# Patient Record
Sex: FEMALE | Race: WHITE | Employment: OTHER | URBAN - METROPOLITAN AREA
[De-identification: names, ages, dates, MRNs, and addresses within clinical notes are randomized per-mention and may not be internally consistent; named-entity substitution may affect disease eponyms.]

---

## 2017-01-10 ENCOUNTER — OFFICE VISIT (OUTPATIENT)
Dept: FAMILY MEDICINE CLINIC | Age: 82
End: 2017-01-10

## 2017-01-10 VITALS
WEIGHT: 181.25 LBS | DIASTOLIC BLOOD PRESSURE: 76 MMHG | SYSTOLIC BLOOD PRESSURE: 164 MMHG | HEART RATE: 60 BPM | BODY MASS INDEX: 25.95 KG/M2 | RESPIRATION RATE: 16 BRPM | HEIGHT: 70 IN

## 2017-01-10 DIAGNOSIS — D69.6 THROMBOCYTOPENIA, UNSPECIFIED (HCC): ICD-10-CM

## 2017-01-10 DIAGNOSIS — I10 ESSENTIAL HYPERTENSION: Primary | ICD-10-CM

## 2017-01-10 PROCEDURE — 99213 OFFICE O/P EST LOW 20 MIN: CPT | Performed by: FAMILY MEDICINE

## 2017-01-10 ASSESSMENT — ENCOUNTER SYMPTOMS
SINUS PRESSURE: 0
CONSTIPATION: 0
SHORTNESS OF BREATH: 0

## 2017-06-08 ENCOUNTER — OFFICE VISIT (OUTPATIENT)
Dept: FAMILY MEDICINE CLINIC | Age: 82
End: 2017-06-08

## 2017-06-08 VITALS
HEIGHT: 70 IN | BODY MASS INDEX: 25.52 KG/M2 | RESPIRATION RATE: 16 BRPM | WEIGHT: 178.25 LBS | HEART RATE: 68 BPM | DIASTOLIC BLOOD PRESSURE: 62 MMHG | SYSTOLIC BLOOD PRESSURE: 112 MMHG

## 2017-06-08 DIAGNOSIS — I79.8 OTHER DISORDERS OF ARTERIES, ARTERIOLES AND CAPILLARIES IN DISEASES CLASSIFIED ELSEWHERE (HCC): ICD-10-CM

## 2017-06-08 DIAGNOSIS — I77.9 BILATERAL CAROTID ARTERY DISEASE (HCC): ICD-10-CM

## 2017-06-08 DIAGNOSIS — I10 ESSENTIAL HYPERTENSION: ICD-10-CM

## 2017-06-08 DIAGNOSIS — Z23 NEED FOR PNEUMOCOCCAL VACCINATION: ICD-10-CM

## 2017-06-08 DIAGNOSIS — H61.23 BILATERAL IMPACTED CERUMEN: Primary | ICD-10-CM

## 2017-06-08 PROCEDURE — 99214 OFFICE O/P EST MOD 30 MIN: CPT | Performed by: FAMILY MEDICINE

## 2017-06-08 PROCEDURE — 69210 REMOVE IMPACTED EAR WAX UNI: CPT | Performed by: FAMILY MEDICINE

## 2017-06-08 RX ORDER — LOSARTAN POTASSIUM AND HYDROCHLOROTHIAZIDE 12.5; 1 MG/1; MG/1
1 TABLET ORAL DAILY
Qty: 90 TABLET | Refills: 3 | Status: SHIPPED | OUTPATIENT
Start: 2017-06-08 | End: 2017-12-11 | Stop reason: SDUPTHER

## 2017-06-08 RX ORDER — BUMETANIDE 1 MG/1
1 TABLET ORAL DAILY
Qty: 90 TABLET | Refills: 3 | Status: SHIPPED | OUTPATIENT
Start: 2017-06-08 | End: 2017-12-11 | Stop reason: SDUPTHER

## 2017-06-08 ASSESSMENT — ENCOUNTER SYMPTOMS
SHORTNESS OF BREATH: 0
SINUS PRESSURE: 0
CONSTIPATION: 0

## 2017-06-10 LAB
CHOLESTEROL, TOTAL: NORMAL MG/DL
CHOLESTEROL/HDL RATIO: NORMAL
HDLC SERPL-MCNC: NORMAL MG/DL (ref 35–70)
LDL CHOLESTEROL CALCULATED: 127 MG/DL (ref 0–160)
TRIGL SERPL-MCNC: NORMAL MG/DL
VLDLC SERPL CALC-MCNC: NORMAL MG/DL

## 2017-06-12 DIAGNOSIS — I10 ESSENTIAL HYPERTENSION: ICD-10-CM

## 2017-06-13 ENCOUNTER — TELEPHONE (OUTPATIENT)
Dept: FAMILY MEDICINE CLINIC | Age: 82
End: 2017-06-13

## 2017-07-19 ENCOUNTER — HOSPITAL ENCOUNTER (INPATIENT)
Age: 82
LOS: 5 days | Discharge: NURSING, REHAB OR LONG-TERM CARE | DRG: 493 | End: 2017-07-24
Attending: FAMILY MEDICINE | Admitting: SURGERY
Payer: OTHER MISCELLANEOUS

## 2017-07-19 ENCOUNTER — APPOINTMENT (OUTPATIENT)
Dept: GENERAL RADIOLOGY | Age: 82
DRG: 493 | End: 2017-07-19
Payer: OTHER MISCELLANEOUS

## 2017-07-19 ENCOUNTER — APPOINTMENT (OUTPATIENT)
Dept: CT IMAGING | Age: 82
DRG: 493 | End: 2017-07-19
Payer: OTHER MISCELLANEOUS
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
## 2017-07-19 DIAGNOSIS — T07.XXXA MULTIPLE CONTUSIONS: ICD-10-CM

## 2017-07-19 DIAGNOSIS — N28.1: ICD-10-CM

## 2017-07-19 DIAGNOSIS — S32.301A: ICD-10-CM

## 2017-07-19 DIAGNOSIS — S42.201A CLOSED FRACTURE OF PROXIMAL END OF RIGHT HUMERUS, UNSPECIFIED FRACTURE MORPHOLOGY, INITIAL ENCOUNTER: ICD-10-CM

## 2017-07-19 DIAGNOSIS — S22.41XA CLOSED FRACTURE OF MULTIPLE RIBS OF RIGHT SIDE, INITIAL ENCOUNTER: Primary | ICD-10-CM

## 2017-07-19 PROBLEM — S22.43XA MULTIPLE FRACTURES OF RIBS, BILATERAL, INITIAL ENCOUNTER FOR CLOSED FRACTURE: Status: ACTIVE | Noted: 2017-07-19

## 2017-07-19 PROBLEM — N30.00 ACUTE CYSTITIS WITHOUT HEMATURIA: Status: ACTIVE | Noted: 2017-07-19

## 2017-07-19 LAB
ALBUMIN SERPL-MCNC: 3.6 G/DL (ref 3.5–5.1)
ALP BLD-CCNC: 94 U/L (ref 38–126)
ALT SERPL-CCNC: 16 U/L (ref 11–66)
AMYLASE: 34 U/L (ref 20–104)
ANION GAP SERPL CALCULATED.3IONS-SCNC: 15 MEQ/L (ref 8–16)
ANISOCYTOSIS: ABNORMAL
APTT: 28.4 SECONDS (ref 22–38)
AST SERPL-CCNC: 23 U/L (ref 5–40)
BACTERIA: ABNORMAL /HPF
BASOPHILS # BLD: 0.7 %
BASOPHILS ABSOLUTE: 0.1 THOU/MM3 (ref 0–0.1)
BILIRUB SERPL-MCNC: 0.3 MG/DL (ref 0.3–1.2)
BILIRUBIN URINE: NEGATIVE
BLOOD, URINE: ABNORMAL
BUN BLDV-MCNC: 20 MG/DL (ref 7–22)
CALCIUM SERPL-MCNC: 9.1 MG/DL (ref 8.5–10.5)
CASTS 2: ABNORMAL /LPF
CASTS UA: ABNORMAL /LPF
CHARACTER, URINE: CLEAR
CHLORIDE BLD-SCNC: 99 MEQ/L (ref 98–111)
CO2: 26 MEQ/L (ref 23–33)
COLOR: YELLOW
CREAT SERPL-MCNC: 0.7 MG/DL (ref 0.4–1.2)
CRYSTALS, UA: ABNORMAL
EOSINOPHIL # BLD: 4 %
EOSINOPHILS ABSOLUTE: 0.4 THOU/MM3 (ref 0–0.4)
EPITHELIAL CELLS, UA: ABNORMAL /HPF
GFR SERPL CREATININE-BSD FRML MDRD: 79 ML/MIN/1.73M2
GLUCOSE BLD-MCNC: 117 MG/DL (ref 70–108)
GLUCOSE URINE: NEGATIVE MG/DL
HCT VFR BLD CALC: 38.2 % (ref 37–47)
HEMOGLOBIN: 12.9 GM/DL (ref 12–16)
INR BLD: 0.93 (ref 0.85–1.13)
KETONES, URINE: ABNORMAL
LEUKOCYTE ESTERASE, URINE: ABNORMAL
LIPASE: 44.7 U/L (ref 5.6–51.3)
LYMPHOCYTES # BLD: 14 %
LYMPHOCYTES ABSOLUTE: 1.3 THOU/MM3 (ref 1–4.8)
MCH RBC QN AUTO: 30.4 PG (ref 27–31)
MCHC RBC AUTO-ENTMCNC: 33.6 GM/DL (ref 33–37)
MCV RBC AUTO: 90.4 FL (ref 81–99)
MISCELLANEOUS 2: ABNORMAL
MONOCYTES # BLD: 8.1 %
MONOCYTES ABSOLUTE: 0.8 THOU/MM3 (ref 0.4–1.3)
NITRITE, URINE: POSITIVE
NUCLEATED RED BLOOD CELLS: 0 /100 WBC
OSMOLALITY CALCULATION: 283 MOSMOL/KG (ref 275–300)
PDW BLD-RTO: 15.8 % (ref 11.5–14.5)
PH UA: 5.5
PLATELET # BLD: 153 THOU/MM3 (ref 130–400)
PMV BLD AUTO: 8.9 MCM (ref 7.4–10.4)
POTASSIUM SERPL-SCNC: 3.4 MEQ/L (ref 3.5–5.2)
PROTEIN UA: NEGATIVE
RBC # BLD: 4.23 MILL/MM3 (ref 4.2–5.4)
RBC # BLD: NORMAL 10*6/UL
RBC URINE: ABNORMAL /HPF
RENAL EPITHELIAL, UA: ABNORMAL
SEG NEUTROPHILS: 73.2 %
SEGMENTED NEUTROPHILS ABSOLUTE COUNT: 7 THOU/MM3 (ref 1.8–7.7)
SODIUM BLD-SCNC: 140 MEQ/L (ref 135–145)
SPECIFIC GRAVITY, URINE: > 1.03 (ref 1–1.03)
TOTAL PROTEIN: 6.7 G/DL (ref 6.1–8)
TROPONIN T: < 0.01 NG/ML
UROBILINOGEN, URINE: 0.2 EU/DL
WBC # BLD: 9.6 THOU/MM3 (ref 4.8–10.8)
WBC UA: ABNORMAL /HPF
YEAST: ABNORMAL

## 2017-07-19 PROCEDURE — 6820000002 HC L2 INJURY CALL ACTIVATION

## 2017-07-19 PROCEDURE — 85025 COMPLETE CBC W/AUTO DIFF WBC: CPT

## 2017-07-19 PROCEDURE — 82150 ASSAY OF AMYLASE: CPT

## 2017-07-19 PROCEDURE — 96376 TX/PRO/DX INJ SAME DRUG ADON: CPT

## 2017-07-19 PROCEDURE — 80053 COMPREHEN METABOLIC PANEL: CPT

## 2017-07-19 PROCEDURE — 36415 COLL VENOUS BLD VENIPUNCTURE: CPT

## 2017-07-19 PROCEDURE — 73552 X-RAY EXAM OF FEMUR 2/>: CPT

## 2017-07-19 PROCEDURE — L0450 TLSO FLEX TRUNK/THOR PRE OTS: HCPCS

## 2017-07-19 PROCEDURE — 73060 X-RAY EXAM OF HUMERUS: CPT

## 2017-07-19 PROCEDURE — 2580000003 HC RX 258: Performed by: FAMILY MEDICINE

## 2017-07-19 PROCEDURE — 96361 HYDRATE IV INFUSION ADD-ON: CPT

## 2017-07-19 PROCEDURE — 99223 1ST HOSP IP/OBS HIGH 75: CPT | Performed by: SURGERY

## 2017-07-19 PROCEDURE — A6445 CONFORM BAND S W <3"/YD: HCPCS

## 2017-07-19 PROCEDURE — 85730 THROMBOPLASTIN TIME PARTIAL: CPT

## 2017-07-19 PROCEDURE — 93005 ELECTROCARDIOGRAM TRACING: CPT

## 2017-07-19 PROCEDURE — 6360000004 HC RX CONTRAST MEDICATION: Performed by: FAMILY MEDICINE

## 2017-07-19 PROCEDURE — 1200000003 HC TELEMETRY R&B

## 2017-07-19 PROCEDURE — 96374 THER/PROPH/DIAG INJ IV PUSH: CPT

## 2017-07-19 PROCEDURE — 6360000002 HC RX W HCPCS: Performed by: FAMILY MEDICINE

## 2017-07-19 PROCEDURE — 83690 ASSAY OF LIPASE: CPT

## 2017-07-19 PROCEDURE — 70450 CT HEAD/BRAIN W/O DYE: CPT

## 2017-07-19 PROCEDURE — 29105 APPLICATION LONG ARM SPLINT: CPT

## 2017-07-19 PROCEDURE — 85610 PROTHROMBIN TIME: CPT

## 2017-07-19 PROCEDURE — 99285 EMERGENCY DEPT VISIT HI MDM: CPT

## 2017-07-19 PROCEDURE — 71260 CT THORAX DX C+: CPT

## 2017-07-19 PROCEDURE — 87086 URINE CULTURE/COLONY COUNT: CPT

## 2017-07-19 PROCEDURE — A6212 FOAM DRG <=16 SQ IN W/BORDER: HCPCS

## 2017-07-19 PROCEDURE — 6370000000 HC RX 637 (ALT 250 FOR IP): Performed by: SURGERY

## 2017-07-19 PROCEDURE — 72125 CT NECK SPINE W/O DYE: CPT

## 2017-07-19 PROCEDURE — 96375 TX/PRO/DX INJ NEW DRUG ADDON: CPT

## 2017-07-19 PROCEDURE — 84484 ASSAY OF TROPONIN QUANT: CPT

## 2017-07-19 PROCEDURE — 74177 CT ABD & PELVIS W/CONTRAST: CPT

## 2017-07-19 PROCEDURE — 81001 URINALYSIS AUTO W/SCOPE: CPT

## 2017-07-19 RX ORDER — BUMETANIDE 1 MG/1
1 TABLET ORAL DAILY
Status: DISCONTINUED | OUTPATIENT
Start: 2017-07-20 | End: 2017-07-24 | Stop reason: HOSPADM

## 2017-07-19 RX ORDER — LOSARTAN POTASSIUM AND HYDROCHLOROTHIAZIDE 12.5; 1 MG/1; MG/1
1 TABLET ORAL DAILY
Status: DISCONTINUED | OUTPATIENT
Start: 2017-07-19 | End: 2017-07-19 | Stop reason: CLARIF

## 2017-07-19 RX ORDER — DIPHENHYDRAMINE HCL 25 MG
25 TABLET ORAL NIGHTLY PRN
Status: ON HOLD | COMMUNITY
End: 2018-01-19

## 2017-07-19 RX ORDER — HYDROCHLOROTHIAZIDE 25 MG/1
12.5 TABLET ORAL DAILY
Status: DISCONTINUED | OUTPATIENT
Start: 2017-07-20 | End: 2017-07-24 | Stop reason: HOSPADM

## 2017-07-19 RX ORDER — ACETAMINOPHEN 325 MG/1
650 TABLET ORAL EVERY 4 HOURS PRN
Status: DISCONTINUED | OUTPATIENT
Start: 2017-07-19 | End: 2017-07-24 | Stop reason: HOSPADM

## 2017-07-19 RX ORDER — SODIUM CHLORIDE 9 MG/ML
INJECTION, SOLUTION INTRAVENOUS CONTINUOUS
Status: DISCONTINUED | OUTPATIENT
Start: 2017-07-19 | End: 2017-07-21

## 2017-07-19 RX ORDER — ONDANSETRON 2 MG/ML
4 INJECTION INTRAMUSCULAR; INTRAVENOUS EVERY 30 MIN PRN
Status: DISCONTINUED | OUTPATIENT
Start: 2017-07-19 | End: 2017-07-19

## 2017-07-19 RX ORDER — CIPROFLOXACIN 500 MG/1
500 TABLET, FILM COATED ORAL EVERY 12 HOURS SCHEDULED
Status: DISCONTINUED | OUTPATIENT
Start: 2017-07-19 | End: 2017-07-23

## 2017-07-19 RX ORDER — SODIUM CHLORIDE 0.9 % (FLUSH) 0.9 %
10 SYRINGE (ML) INJECTION EVERY 12 HOURS SCHEDULED
Status: DISCONTINUED | OUTPATIENT
Start: 2017-07-19 | End: 2017-07-24 | Stop reason: HOSPADM

## 2017-07-19 RX ORDER — GINSENG 100 MG
CAPSULE ORAL 2 TIMES DAILY
Status: DISCONTINUED | OUTPATIENT
Start: 2017-07-19 | End: 2017-07-24 | Stop reason: HOSPADM

## 2017-07-19 RX ORDER — SODIUM CHLORIDE 0.9 % (FLUSH) 0.9 %
10 SYRINGE (ML) INJECTION PRN
Status: DISCONTINUED | OUTPATIENT
Start: 2017-07-19 | End: 2017-07-24 | Stop reason: HOSPADM

## 2017-07-19 RX ORDER — HYDROCODONE BITARTRATE AND ACETAMINOPHEN 5; 325 MG/1; MG/1
1 TABLET ORAL EVERY 4 HOURS PRN
Status: DISCONTINUED | OUTPATIENT
Start: 2017-07-19 | End: 2017-07-21

## 2017-07-19 RX ORDER — LIDOCAINE 50 MG/G
2 PATCH TOPICAL NIGHTLY
Status: DISCONTINUED | OUTPATIENT
Start: 2017-07-19 | End: 2017-07-24 | Stop reason: HOSPADM

## 2017-07-19 RX ORDER — 0.9 % SODIUM CHLORIDE 0.9 %
1000 INTRAVENOUS SOLUTION INTRAVENOUS ONCE
Status: COMPLETED | OUTPATIENT
Start: 2017-07-19 | End: 2017-07-19

## 2017-07-19 RX ORDER — HYDROCODONE BITARTRATE AND ACETAMINOPHEN 5; 325 MG/1; MG/1
2 TABLET ORAL EVERY 4 HOURS PRN
Status: DISCONTINUED | OUTPATIENT
Start: 2017-07-19 | End: 2017-07-21

## 2017-07-19 RX ORDER — FENTANYL CITRATE 50 UG/ML
100 INJECTION, SOLUTION INTRAMUSCULAR; INTRAVENOUS
Status: COMPLETED | OUTPATIENT
Start: 2017-07-19 | End: 2017-07-19

## 2017-07-19 RX ORDER — ONDANSETRON 2 MG/ML
4 INJECTION INTRAMUSCULAR; INTRAVENOUS EVERY 6 HOURS PRN
Status: DISCONTINUED | OUTPATIENT
Start: 2017-07-19 | End: 2017-07-24 | Stop reason: HOSPADM

## 2017-07-19 RX ORDER — DOCUSATE SODIUM 100 MG/1
100 CAPSULE, LIQUID FILLED ORAL 2 TIMES DAILY
Status: DISCONTINUED | OUTPATIENT
Start: 2017-07-19 | End: 2017-07-24 | Stop reason: HOSPADM

## 2017-07-19 RX ORDER — LOSARTAN POTASSIUM 100 MG/1
100 TABLET ORAL DAILY
Status: DISCONTINUED | OUTPATIENT
Start: 2017-07-20 | End: 2017-07-24 | Stop reason: HOSPADM

## 2017-07-19 RX ADMIN — FENTANYL CITRATE 100 MCG: 50 INJECTION INTRAMUSCULAR; INTRAVENOUS at 16:18

## 2017-07-19 RX ADMIN — BACITRACIN: 500 OINTMENT TOPICAL at 23:12

## 2017-07-19 RX ADMIN — FENTANYL CITRATE 100 MCG: 50 INJECTION INTRAMUSCULAR; INTRAVENOUS at 19:30

## 2017-07-19 RX ADMIN — IOPAMIDOL 80 ML: 755 INJECTION, SOLUTION INTRAVENOUS at 17:09

## 2017-07-19 RX ADMIN — METOPROLOL TARTRATE 25 MG: 25 TABLET ORAL at 23:12

## 2017-07-19 RX ADMIN — SODIUM CHLORIDE: 9 INJECTION, SOLUTION INTRAVENOUS at 19:03

## 2017-07-19 RX ADMIN — SODIUM CHLORIDE: 9 INJECTION, SOLUTION INTRAVENOUS at 22:08

## 2017-07-19 RX ADMIN — DOCUSATE SODIUM 100 MG: 100 CAPSULE ORAL at 23:12

## 2017-07-19 RX ADMIN — CIPROFLOXACIN HYDROCHLORIDE 500 MG: 500 TABLET, FILM COATED ORAL at 23:12

## 2017-07-19 RX ADMIN — ONDANSETRON 4 MG: 2 INJECTION INTRAMUSCULAR; INTRAVENOUS at 16:18

## 2017-07-19 RX ADMIN — HYDROCODONE BITARTRATE AND ACETAMINOPHEN 1 TABLET: 5; 325 TABLET ORAL at 23:13

## 2017-07-19 RX ADMIN — SODIUM CHLORIDE 1000 ML: 9 INJECTION, SOLUTION INTRAVENOUS at 16:23

## 2017-07-19 ASSESSMENT — ENCOUNTER SYMPTOMS
SHORTNESS OF BREATH: 0
WHEEZING: 0
RHINORRHEA: 0
EYE DISCHARGE: 0
BACK PAIN: 0
VOMITING: 0
COUGH: 0
DIARRHEA: 0
EYE PAIN: 0
ABDOMINAL PAIN: 0
SORE THROAT: 0
NAUSEA: 0

## 2017-07-19 ASSESSMENT — PAIN DESCRIPTION - DESCRIPTORS
DESCRIPTORS: SHARP;ACHING
DESCRIPTORS: SORE;SHARP

## 2017-07-19 ASSESSMENT — PAIN DESCRIPTION - LOCATION
LOCATION: SHOULDER
LOCATION: SHOULDER;ARM

## 2017-07-19 ASSESSMENT — PAIN DESCRIPTION - ONSET
ONSET: SUDDEN
ONSET: SUDDEN

## 2017-07-19 ASSESSMENT — PAIN SCALES - GENERAL
PAINLEVEL_OUTOF10: 10
PAINLEVEL_OUTOF10: 0
PAINLEVEL_OUTOF10: 5
PAINLEVEL_OUTOF10: 10
PAINLEVEL_OUTOF10: 3
PAINLEVEL_OUTOF10: 4
PAINLEVEL_OUTOF10: 5

## 2017-07-19 ASSESSMENT — PAIN DESCRIPTION - PAIN TYPE
TYPE: ACUTE PAIN
TYPE: ACUTE PAIN

## 2017-07-19 ASSESSMENT — PAIN DESCRIPTION - ORIENTATION
ORIENTATION: RIGHT
ORIENTATION: RIGHT

## 2017-07-19 ASSESSMENT — PAIN DESCRIPTION - FREQUENCY
FREQUENCY: INTERMITTENT
FREQUENCY: INTERMITTENT

## 2017-07-19 ASSESSMENT — PAIN DESCRIPTION - PROGRESSION
CLINICAL_PROGRESSION: GRADUALLY WORSENING
CLINICAL_PROGRESSION: NOT CHANGED

## 2017-07-20 ENCOUNTER — ANESTHESIA EVENT (OUTPATIENT)
Dept: OPERATING ROOM | Age: 82
DRG: 493 | End: 2017-07-20
Payer: OTHER MISCELLANEOUS

## 2017-07-20 ENCOUNTER — APPOINTMENT (OUTPATIENT)
Dept: GENERAL RADIOLOGY | Age: 82
DRG: 493 | End: 2017-07-20
Payer: OTHER MISCELLANEOUS

## 2017-07-20 ENCOUNTER — ANESTHESIA (OUTPATIENT)
Dept: OPERATING ROOM | Age: 82
DRG: 493 | End: 2017-07-20
Payer: OTHER MISCELLANEOUS

## 2017-07-20 VITALS
SYSTOLIC BLOOD PRESSURE: 161 MMHG | OXYGEN SATURATION: 100 % | DIASTOLIC BLOOD PRESSURE: 69 MMHG | TEMPERATURE: 98.2 F | RESPIRATION RATE: 3 BRPM

## 2017-07-20 PROBLEM — I50.32 CHRONIC DIASTOLIC CHF (CONGESTIVE HEART FAILURE) (HCC): Status: ACTIVE | Noted: 2017-07-20

## 2017-07-20 LAB
ANION GAP SERPL CALCULATED.3IONS-SCNC: 11 MEQ/L (ref 8–16)
ANISOCYTOSIS: ABNORMAL
BASOPHILS # BLD: 0.5 %
BASOPHILS ABSOLUTE: 0 THOU/MM3 (ref 0–0.1)
BUN BLDV-MCNC: 15 MG/DL (ref 7–22)
CALCIUM SERPL-MCNC: 8.1 MG/DL (ref 8.5–10.5)
CHLORIDE BLD-SCNC: 103 MEQ/L (ref 98–111)
CO2: 25 MEQ/L (ref 23–33)
CREAT SERPL-MCNC: 0.5 MG/DL (ref 0.4–1.2)
EKG ATRIAL RATE: 63 BPM
EKG P AXIS: 40 DEGREES
EKG P-R INTERVAL: 224 MS
EKG Q-T INTERVAL: 504 MS
EKG QRS DURATION: 142 MS
EKG QTC CALCULATION (BAZETT): 515 MS
EKG R AXIS: 17 DEGREES
EKG T AXIS: 74 DEGREES
EKG VENTRICULAR RATE: 63 BPM
EOSINOPHIL # BLD: 0.8 %
EOSINOPHILS ABSOLUTE: 0.1 THOU/MM3 (ref 0–0.4)
GFR SERPL CREATININE-BSD FRML MDRD: > 90 ML/MIN/1.73M2
GLUCOSE BLD-MCNC: 124 MG/DL (ref 70–108)
HCT VFR BLD CALC: 31 % (ref 37–47)
HEMOGLOBIN: 10.4 GM/DL (ref 12–16)
LV EF: 63 %
LVEF MODALITY: NORMAL
LYMPHOCYTES # BLD: 14.1 %
LYMPHOCYTES ABSOLUTE: 1.1 THOU/MM3 (ref 1–4.8)
MAGNESIUM: 1.8 MG/DL (ref 1.6–2.4)
MCH RBC QN AUTO: 30.7 PG (ref 27–31)
MCHC RBC AUTO-ENTMCNC: 33.5 GM/DL (ref 33–37)
MCV RBC AUTO: 91.6 FL (ref 81–99)
MONOCYTES # BLD: 11.9 %
MONOCYTES ABSOLUTE: 0.9 THOU/MM3 (ref 0.4–1.3)
NUCLEATED RED BLOOD CELLS: 0 /100 WBC
PDW BLD-RTO: 16 % (ref 11.5–14.5)
PLATELET # BLD: 119 THOU/MM3 (ref 130–400)
PMV BLD AUTO: 8.8 MCM (ref 7.4–10.4)
POTASSIUM SERPL-SCNC: 3.4 MEQ/L (ref 3.5–5.2)
RBC # BLD: 3.38 MILL/MM3 (ref 4.2–5.4)
RBC # BLD: NORMAL 10*6/UL
SEG NEUTROPHILS: 72.7 %
SEGMENTED NEUTROPHILS ABSOLUTE COUNT: 5.5 THOU/MM3 (ref 1.8–7.7)
SODIUM BLD-SCNC: 139 MEQ/L (ref 135–145)
WBC # BLD: 7.6 THOU/MM3 (ref 4.8–10.8)

## 2017-07-20 PROCEDURE — 1200000000 HC SEMI PRIVATE

## 2017-07-20 PROCEDURE — 94010 BREATHING CAPACITY TEST: CPT

## 2017-07-20 PROCEDURE — 80048 BASIC METABOLIC PNL TOTAL CA: CPT

## 2017-07-20 PROCEDURE — 6370000000 HC RX 637 (ALT 250 FOR IP): Performed by: SURGERY

## 2017-07-20 PROCEDURE — 93306 TTE W/DOPPLER COMPLETE: CPT

## 2017-07-20 PROCEDURE — 99232 SBSQ HOSP IP/OBS MODERATE 35: CPT | Performed by: FAMILY MEDICINE

## 2017-07-20 PROCEDURE — 6360000002 HC RX W HCPCS: Performed by: SURGERY

## 2017-07-20 PROCEDURE — 85025 COMPLETE CBC W/AUTO DIFF WBC: CPT

## 2017-07-20 PROCEDURE — A4565 SLINGS: HCPCS | Performed by: ORTHOPAEDIC SURGERY

## 2017-07-20 PROCEDURE — 99999 PR OFFICE/OUTPT VISIT,PROCEDURE ONLY: CPT | Performed by: NURSE PRACTITIONER

## 2017-07-20 PROCEDURE — 73130 X-RAY EXAM OF HAND: CPT

## 2017-07-20 PROCEDURE — 3600000014 HC SURGERY LEVEL 4 ADDTL 15MIN: Performed by: ORTHOPAEDIC SURGERY

## 2017-07-20 PROCEDURE — 99232 SBSQ HOSP IP/OBS MODERATE 35: CPT | Performed by: SURGERY

## 2017-07-20 PROCEDURE — 7100000000 HC PACU RECOVERY - FIRST 15 MIN: Performed by: ORTHOPAEDIC SURGERY

## 2017-07-20 PROCEDURE — 36415 COLL VENOUS BLD VENIPUNCTURE: CPT

## 2017-07-20 PROCEDURE — 7100000001 HC PACU RECOVERY - ADDTL 15 MIN: Performed by: ORTHOPAEDIC SURGERY

## 2017-07-20 PROCEDURE — 6360000002 HC RX W HCPCS: Performed by: ANESTHESIOLOGY

## 2017-07-20 PROCEDURE — 2500000003 HC RX 250 WO HCPCS: Performed by: ANESTHESIOLOGY

## 2017-07-20 PROCEDURE — 3700000001 HC ADD 15 MINUTES (ANESTHESIA): Performed by: ORTHOPAEDIC SURGERY

## 2017-07-20 PROCEDURE — 0PSF04Z REPOSITION RIGHT HUMERAL SHAFT WITH INTERNAL FIXATION DEVICE, OPEN APPROACH: ICD-10-PCS | Performed by: ORTHOPAEDIC SURGERY

## 2017-07-20 PROCEDURE — A6258 TRANSPARENT FILM >16<=48 IN: HCPCS | Performed by: ORTHOPAEDIC SURGERY

## 2017-07-20 PROCEDURE — 3600000004 HC SURGERY LEVEL 4 BASE: Performed by: ORTHOPAEDIC SURGERY

## 2017-07-20 PROCEDURE — 3700000000 HC ANESTHESIA ATTENDED CARE: Performed by: ORTHOPAEDIC SURGERY

## 2017-07-20 PROCEDURE — 6370000000 HC RX 637 (ALT 250 FOR IP): Performed by: ORTHOPAEDIC SURGERY

## 2017-07-20 PROCEDURE — 3209999900 FLUORO FOR SURGICAL PROCEDURES

## 2017-07-20 PROCEDURE — A6223 GAUZE >16<=48 NO W/SAL W/O B: HCPCS | Performed by: ORTHOPAEDIC SURGERY

## 2017-07-20 PROCEDURE — 2580000003 HC RX 258: Performed by: FAMILY MEDICINE

## 2017-07-20 PROCEDURE — 2720000010 HC SURG SUPPLY STERILE: Performed by: ORTHOPAEDIC SURGERY

## 2017-07-20 PROCEDURE — 2580000003 HC RX 258: Performed by: ANESTHESIOLOGY

## 2017-07-20 PROCEDURE — C1713 ANCHOR/SCREW BN/BN,TIS/BN: HCPCS | Performed by: ORTHOPAEDIC SURGERY

## 2017-07-20 PROCEDURE — 83735 ASSAY OF MAGNESIUM: CPT

## 2017-07-20 PROCEDURE — 73060 X-RAY EXAM OF HUMERUS: CPT

## 2017-07-20 DEVICE — TRIGEN 4.0MM X 24MM SELF-TAPPING                                    CORTICAL SCREW TITANIUM
Type: IMPLANTABLE DEVICE | Site: ARM | Status: FUNCTIONAL
Brand: TRIGEN

## 2017-07-20 DEVICE — TRIGEN 4.0MM X 26MM SELF-TAPPING                                    CORTICAL SCREW TITANIUM
Type: IMPLANTABLE DEVICE | Site: ARM | Status: FUNCTIONAL
Brand: TRIGEN

## 2017-07-20 DEVICE — 5.0MM X 40MM SELF-TAPPING                                    CANCELLOUS SCREW TITANIUM
Type: IMPLANTABLE DEVICE | Site: ARM | Status: FUNCTIONAL
Brand: TRIGEN

## 2017-07-20 DEVICE — TRIGEN HUMERAL NAIL 8/7MM X 16CM                                    PROXIMAL BENT
Type: IMPLANTABLE DEVICE | Site: ARM | Status: FUNCTIONAL
Brand: TRIGEN

## 2017-07-20 DEVICE — 5.0MM X 38MM SELF-TAPPING                                    CANCELLOUS SCREW TITANIUM
Type: IMPLANTABLE DEVICE | Site: ARM | Status: FUNCTIONAL
Brand: TRIGEN

## 2017-07-20 DEVICE — TRIGEN 4.0MM X 22MM SELF-TAPPING                                    CORTICAL SCREW TITANIUM
Type: IMPLANTABLE DEVICE | Site: ARM | Status: FUNCTIONAL
Brand: TRIGEN

## 2017-07-20 RX ORDER — MIDAZOLAM HYDROCHLORIDE 1 MG/ML
INJECTION INTRAMUSCULAR; INTRAVENOUS PRN
Status: DISCONTINUED | OUTPATIENT
Start: 2017-07-20 | End: 2017-07-20 | Stop reason: SDUPTHER

## 2017-07-20 RX ORDER — CEFAZOLIN SODIUM 1 G/3ML
INJECTION, POWDER, FOR SOLUTION INTRAMUSCULAR; INTRAVENOUS PRN
Status: DISCONTINUED | OUTPATIENT
Start: 2017-07-20 | End: 2017-07-20 | Stop reason: SDUPTHER

## 2017-07-20 RX ORDER — LABETALOL HYDROCHLORIDE 5 MG/ML
5 INJECTION, SOLUTION INTRAVENOUS EVERY 10 MIN PRN
Status: DISCONTINUED | OUTPATIENT
Start: 2017-07-20 | End: 2017-07-20 | Stop reason: HOSPADM

## 2017-07-20 RX ORDER — FENTANYL CITRATE 50 UG/ML
25 INJECTION, SOLUTION INTRAMUSCULAR; INTRAVENOUS EVERY 5 MIN PRN
Status: COMPLETED | OUTPATIENT
Start: 2017-07-20 | End: 2017-07-20

## 2017-07-20 RX ORDER — LIDOCAINE HYDROCHLORIDE 20 MG/ML
INJECTION, SOLUTION INFILTRATION; PERINEURAL PRN
Status: DISCONTINUED | OUTPATIENT
Start: 2017-07-20 | End: 2017-07-20 | Stop reason: SDUPTHER

## 2017-07-20 RX ORDER — GLYCOPYRROLATE 0.2 MG/ML
INJECTION INTRAMUSCULAR; INTRAVENOUS PRN
Status: DISCONTINUED | OUTPATIENT
Start: 2017-07-20 | End: 2017-07-20 | Stop reason: SDUPTHER

## 2017-07-20 RX ORDER — HYDROCODONE BITARTRATE AND ACETAMINOPHEN 5; 325 MG/1; MG/1
1-2 TABLET ORAL EVERY 4 HOURS PRN
Qty: 80 TABLET | Refills: 0 | Status: SHIPPED | OUTPATIENT
Start: 2017-07-20 | End: 2017-07-30

## 2017-07-20 RX ORDER — FENTANYL CITRATE 50 UG/ML
50 INJECTION, SOLUTION INTRAMUSCULAR; INTRAVENOUS EVERY 5 MIN PRN
Status: DISCONTINUED | OUTPATIENT
Start: 2017-07-20 | End: 2017-07-20 | Stop reason: HOSPADM

## 2017-07-20 RX ORDER — MEPERIDINE HYDROCHLORIDE 25 MG/ML
25 INJECTION INTRAMUSCULAR; INTRAVENOUS; SUBCUTANEOUS
Status: DISCONTINUED | OUTPATIENT
Start: 2017-07-20 | End: 2017-07-20 | Stop reason: HOSPADM

## 2017-07-20 RX ORDER — DEXAMETHASONE SODIUM PHOSPHATE 4 MG/ML
INJECTION, SOLUTION INTRA-ARTICULAR; INTRALESIONAL; INTRAMUSCULAR; INTRAVENOUS; SOFT TISSUE PRN
Status: DISCONTINUED | OUTPATIENT
Start: 2017-07-20 | End: 2017-07-20 | Stop reason: SDUPTHER

## 2017-07-20 RX ORDER — SUCCINYLCHOLINE CHLORIDE 20 MG/ML
INJECTION INTRAMUSCULAR; INTRAVENOUS PRN
Status: DISCONTINUED | OUTPATIENT
Start: 2017-07-20 | End: 2017-07-20 | Stop reason: SDUPTHER

## 2017-07-20 RX ORDER — PROPOFOL 10 MG/ML
INJECTION, EMULSION INTRAVENOUS PRN
Status: DISCONTINUED | OUTPATIENT
Start: 2017-07-20 | End: 2017-07-20 | Stop reason: SDUPTHER

## 2017-07-20 RX ORDER — POTASSIUM CHLORIDE 750 MG/1
40 TABLET, FILM COATED, EXTENDED RELEASE ORAL ONCE
Status: COMPLETED | OUTPATIENT
Start: 2017-07-20 | End: 2017-07-20

## 2017-07-20 RX ORDER — DIPHENHYDRAMINE HYDROCHLORIDE 50 MG/ML
12.5 INJECTION INTRAMUSCULAR; INTRAVENOUS
Status: DISCONTINUED | OUTPATIENT
Start: 2017-07-20 | End: 2017-07-20 | Stop reason: HOSPADM

## 2017-07-20 RX ORDER — METOPROLOL TARTRATE 5 MG/5ML
INJECTION INTRAVENOUS PRN
Status: DISCONTINUED | OUTPATIENT
Start: 2017-07-20 | End: 2017-07-20 | Stop reason: SDUPTHER

## 2017-07-20 RX ORDER — SODIUM CHLORIDE 9 MG/ML
INJECTION, SOLUTION INTRAVENOUS CONTINUOUS PRN
Status: DISCONTINUED | OUTPATIENT
Start: 2017-07-20 | End: 2017-07-20 | Stop reason: SDUPTHER

## 2017-07-20 RX ORDER — FENTANYL CITRATE 50 UG/ML
INJECTION, SOLUTION INTRAMUSCULAR; INTRAVENOUS
Status: DISPENSED
Start: 2017-07-20 | End: 2017-07-21

## 2017-07-20 RX ORDER — FENTANYL CITRATE 50 UG/ML
INJECTION, SOLUTION INTRAMUSCULAR; INTRAVENOUS PRN
Status: DISCONTINUED | OUTPATIENT
Start: 2017-07-20 | End: 2017-07-20 | Stop reason: SDUPTHER

## 2017-07-20 RX ORDER — KETAMINE HYDROCHLORIDE 50 MG/ML
INJECTION, SOLUTION, CONCENTRATE INTRAMUSCULAR; INTRAVENOUS PRN
Status: DISCONTINUED | OUTPATIENT
Start: 2017-07-20 | End: 2017-07-20 | Stop reason: SDUPTHER

## 2017-07-20 RX ORDER — NEOSTIGMINE METHYLSULFATE 1 MG/ML
INJECTION, SOLUTION INTRAVENOUS PRN
Status: DISCONTINUED | OUTPATIENT
Start: 2017-07-20 | End: 2017-07-20 | Stop reason: SDUPTHER

## 2017-07-20 RX ORDER — NEOSTIGMINE METHYLSULFATE 1 MG/ML
INJECTION, SOLUTION INTRAVENOUS PRN
Status: DISCONTINUED | OUTPATIENT
Start: 2017-07-20 | End: 2017-07-20

## 2017-07-20 RX ORDER — PROMETHAZINE HYDROCHLORIDE 25 MG/ML
12.5 INJECTION, SOLUTION INTRAMUSCULAR; INTRAVENOUS
Status: DISCONTINUED | OUTPATIENT
Start: 2017-07-20 | End: 2017-07-20 | Stop reason: HOSPADM

## 2017-07-20 RX ORDER — ROCURONIUM BROMIDE 10 MG/ML
INJECTION, SOLUTION INTRAVENOUS PRN
Status: DISCONTINUED | OUTPATIENT
Start: 2017-07-20 | End: 2017-07-20 | Stop reason: SDUPTHER

## 2017-07-20 RX ADMIN — METOPROLOL TARTRATE 25 MG: 25 TABLET ORAL at 19:57

## 2017-07-20 RX ADMIN — ONDANSETRON 4 MG: 2 INJECTION INTRAMUSCULAR; INTRAVENOUS at 12:10

## 2017-07-20 RX ADMIN — DOCUSATE SODIUM 100 MG: 100 CAPSULE ORAL at 19:57

## 2017-07-20 RX ADMIN — FENTANYL CITRATE 25 MCG: 50 INJECTION INTRAMUSCULAR; INTRAVENOUS at 13:40

## 2017-07-20 RX ADMIN — SODIUM CHLORIDE: 9 INJECTION, SOLUTION INTRAVENOUS at 11:51

## 2017-07-20 RX ADMIN — BUMETANIDE 1 MG: 1 TABLET ORAL at 09:14

## 2017-07-20 RX ADMIN — LIDOCAINE HYDROCHLORIDE 60 MG: 20 INJECTION, SOLUTION INFILTRATION; PERINEURAL at 11:55

## 2017-07-20 RX ADMIN — HYDROMORPHONE HYDROCHLORIDE 0.25 MG: 1 INJECTION, SOLUTION INTRAMUSCULAR; INTRAVENOUS; SUBCUTANEOUS at 09:22

## 2017-07-20 RX ADMIN — FENTANYL CITRATE 25 MCG: 50 INJECTION INTRAMUSCULAR; INTRAVENOUS at 14:40

## 2017-07-20 RX ADMIN — METOPROLOL TARTRATE 1 MG: 5 INJECTION INTRAVENOUS at 12:28

## 2017-07-20 RX ADMIN — HYDROCODONE BITARTRATE AND ACETAMINOPHEN 2 TABLET: 5; 325 TABLET ORAL at 23:15

## 2017-07-20 RX ADMIN — SODIUM CHLORIDE: 9 INJECTION, SOLUTION INTRAVENOUS at 19:57

## 2017-07-20 RX ADMIN — ROCURONIUM BROMIDE 30 MG: 10 INJECTION INTRAVENOUS at 12:00

## 2017-07-20 RX ADMIN — FENTANYL CITRATE 25 MCG: 50 INJECTION, SOLUTION INTRAMUSCULAR; INTRAVENOUS at 12:25

## 2017-07-20 RX ADMIN — KETAMINE HYDROCHLORIDE 25 MG: 50 INJECTION, SOLUTION INTRAMUSCULAR; INTRAVENOUS at 11:59

## 2017-07-20 RX ADMIN — FENTANYL CITRATE 25 MCG: 50 INJECTION INTRAMUSCULAR; INTRAVENOUS at 13:35

## 2017-07-20 RX ADMIN — GLYCOPYRROLATE 0.2 MG: 0.2 INJECTION, SOLUTION INTRAMUSCULAR; INTRAVENOUS at 11:58

## 2017-07-20 RX ADMIN — FENTANYL CITRATE 25 MCG: 50 INJECTION INTRAMUSCULAR; INTRAVENOUS at 13:50

## 2017-07-20 RX ADMIN — METOPROLOL TARTRATE 25 MG: 25 TABLET ORAL at 09:14

## 2017-07-20 RX ADMIN — METOPROLOL TARTRATE 1 MG: 5 INJECTION INTRAVENOUS at 13:15

## 2017-07-20 RX ADMIN — CEFAZOLIN 1000 MG: 1 INJECTION, POWDER, FOR SOLUTION INTRAMUSCULAR; INTRAVENOUS; PARENTERAL at 12:06

## 2017-07-20 RX ADMIN — SODIUM CHLORIDE: 9 INJECTION, SOLUTION INTRAVENOUS at 09:09

## 2017-07-20 RX ADMIN — DEXAMETHASONE SODIUM PHOSPHATE 4 MG: 4 INJECTION, SOLUTION INTRAMUSCULAR; INTRAVENOUS at 12:10

## 2017-07-20 RX ADMIN — MIDAZOLAM 1 MG: 1 INJECTION INTRAMUSCULAR; INTRAVENOUS at 11:58

## 2017-07-20 RX ADMIN — HYDROMORPHONE HYDROCHLORIDE 0.5 MG: 1 INJECTION, SOLUTION INTRAMUSCULAR; INTRAVENOUS; SUBCUTANEOUS at 17:45

## 2017-07-20 RX ADMIN — HYDROMORPHONE HYDROCHLORIDE 0.25 MG: 1 INJECTION, SOLUTION INTRAMUSCULAR; INTRAVENOUS; SUBCUTANEOUS at 04:20

## 2017-07-20 RX ADMIN — CHLORASEPTIC 1 SPRAY: 1.5 LIQUID ORAL at 23:16

## 2017-07-20 RX ADMIN — BACITRACIN: 500 OINTMENT TOPICAL at 19:56

## 2017-07-20 RX ADMIN — SUCCINYLCHOLINE CHLORIDE 80 MG: 20 INJECTION, SOLUTION INTRAMUSCULAR; INTRAVENOUS at 11:59

## 2017-07-20 RX ADMIN — NEOSTIGMINE METHYLSULFATE 3 MG: 1 INJECTION INTRAVENOUS at 12:55

## 2017-07-20 RX ADMIN — CIPROFLOXACIN HYDROCHLORIDE 500 MG: 500 TABLET, FILM COATED ORAL at 09:13

## 2017-07-20 RX ADMIN — POTASSIUM CHLORIDE 40 MEQ: 750 TABLET, FILM COATED, EXTENDED RELEASE ORAL at 09:13

## 2017-07-20 RX ADMIN — PROPOFOL 80 MG: 10 INJECTION, EMULSION INTRAVENOUS at 11:58

## 2017-07-20 RX ADMIN — CIPROFLOXACIN HYDROCHLORIDE 500 MG: 500 TABLET, FILM COATED ORAL at 19:57

## 2017-07-20 RX ADMIN — FENTANYL CITRATE 50 MCG: 50 INJECTION, SOLUTION INTRAMUSCULAR; INTRAVENOUS at 11:58

## 2017-07-20 RX ADMIN — BACITRACIN: 500 OINTMENT TOPICAL at 09:07

## 2017-07-20 RX ADMIN — GLYCOPYRROLATE 0.4 MG: 0.2 INJECTION, SOLUTION INTRAMUSCULAR; INTRAVENOUS at 12:55

## 2017-07-20 ASSESSMENT — PULMONARY FUNCTION TESTS
PIF_VALUE: 16
PIF_VALUE: 17
PIF_VALUE: 16
PIF_VALUE: 18
PIF_VALUE: 16
PIF_VALUE: 17
PIF_VALUE: 18
PIF_VALUE: 2
PIF_VALUE: 4
PIF_VALUE: 0
PIF_VALUE: 18
PIF_VALUE: 18
PIF_VALUE: 17
PIF_VALUE: 19
PIF_VALUE: 18
PIF_VALUE: 17
PIF_VALUE: 8
PIF_VALUE: 1
PIF_VALUE: 20
PIF_VALUE: 18
PIF_VALUE: 1
PIF_VALUE: 21
PIF_VALUE: 22
PIF_VALUE: 18
PIF_VALUE: 17
PIF_VALUE: 18
PIF_VALUE: 16
PIF_VALUE: 1
PIF_VALUE: 18
PIF_VALUE: 28
PIF_VALUE: 14
PIF_VALUE: 18
PIF_VALUE: 20
PIF_VALUE: 7
PIF_VALUE: 18
PIF_VALUE: 1
PIF_VALUE: 17
PIF_VALUE: 18
PIF_VALUE: 16
PIF_VALUE: 17
PIF_VALUE: 0
PIF_VALUE: 0
PIF_VALUE: 18
PIF_VALUE: 8
PIF_VALUE: 21
PIF_VALUE: 16
PIF_VALUE: 18
PIF_VALUE: 18
PIF_VALUE: 16
PIF_VALUE: 24
PIF_VALUE: 18
PIF_VALUE: 17
PIF_VALUE: 17
PIF_VALUE: 0
PIF_VALUE: 2
PIF_VALUE: 16
PIF_VALUE: 16
PIF_VALUE: 17
PIF_VALUE: 0
PIF_VALUE: 3
PIF_VALUE: 17
PIF_VALUE: 18
PIF_VALUE: 1
PIF_VALUE: 2
PIF_VALUE: 18
PIF_VALUE: 17
PIF_VALUE: 22

## 2017-07-20 ASSESSMENT — PAIN DESCRIPTION - LOCATION
LOCATION: ARM;SHOULDER
LOCATION: ARM;SHOULDER

## 2017-07-20 ASSESSMENT — PAIN SCALES - GENERAL
PAINLEVEL_OUTOF10: 0
PAINLEVEL_OUTOF10: 6
PAINLEVEL_OUTOF10: 5
PAINLEVEL_OUTOF10: 6
PAINLEVEL_OUTOF10: 9
PAINLEVEL_OUTOF10: 0
PAINLEVEL_OUTOF10: 3
PAINLEVEL_OUTOF10: 5
PAINLEVEL_OUTOF10: 9
PAINLEVEL_OUTOF10: 6
PAINLEVEL_OUTOF10: 7
PAINLEVEL_OUTOF10: 10
PAINLEVEL_OUTOF10: 5
PAINLEVEL_OUTOF10: 6
PAINLEVEL_OUTOF10: 0

## 2017-07-20 ASSESSMENT — ENCOUNTER SYMPTOMS
COUGH: 0
TROUBLE SWALLOWING: 0
COLOR CHANGE: 0
DIARRHEA: 0
VOMITING: 0
PHOTOPHOBIA: 0
SORE THROAT: 0
ABDOMINAL DISTENTION: 0
APNEA: 0
NAUSEA: 0
SHORTNESS OF BREATH: 0
CONSTIPATION: 0
CHOKING: 0
EYE ITCHING: 0
EYE PAIN: 0
CHEST TIGHTNESS: 0
EYE REDNESS: 0
WHEEZING: 0
SINUS PRESSURE: 0
BLOOD IN STOOL: 0
EYE DISCHARGE: 0
BACK PAIN: 0
RHINORRHEA: 0
VOICE CHANGE: 0
ABDOMINAL PAIN: 1
STRIDOR: 0
FACIAL SWELLING: 0

## 2017-07-20 ASSESSMENT — PAIN DESCRIPTION - FREQUENCY
FREQUENCY: INTERMITTENT
FREQUENCY: INTERMITTENT

## 2017-07-20 ASSESSMENT — PAIN DESCRIPTION - ORIENTATION
ORIENTATION: RIGHT
ORIENTATION: RIGHT

## 2017-07-20 ASSESSMENT — PAIN DESCRIPTION - PROGRESSION
CLINICAL_PROGRESSION: NOT CHANGED

## 2017-07-20 ASSESSMENT — PAIN DESCRIPTION - DESCRIPTORS
DESCRIPTORS: ACHING;PRESSURE
DESCRIPTORS: ACHING;PRESSURE

## 2017-07-20 ASSESSMENT — PAIN DESCRIPTION - ONSET
ONSET: SUDDEN
ONSET: SUDDEN

## 2017-07-20 ASSESSMENT — PAIN DESCRIPTION - PAIN TYPE
TYPE: ACUTE PAIN
TYPE: ACUTE PAIN

## 2017-07-21 LAB
ANION GAP SERPL CALCULATED.3IONS-SCNC: 10 MEQ/L (ref 8–16)
ANION GAP SERPL CALCULATED.3IONS-SCNC: 11 MEQ/L (ref 8–16)
ANISOCYTOSIS: ABNORMAL
BASOPHILS # BLD: 0.3 %
BASOPHILS ABSOLUTE: 0 THOU/MM3 (ref 0–0.1)
BUN BLDV-MCNC: 11 MG/DL (ref 7–22)
BUN BLDV-MCNC: 11 MG/DL (ref 7–22)
CALCIUM IONIZED: 1.06 MMOL/L (ref 1.12–1.32)
CALCIUM SERPL-MCNC: 7.7 MG/DL (ref 8.5–10.5)
CALCIUM SERPL-MCNC: 8.1 MG/DL (ref 8.5–10.5)
CHLORIDE BLD-SCNC: 100 MEQ/L (ref 98–111)
CHLORIDE BLD-SCNC: 104 MEQ/L (ref 98–111)
CO2: 24 MEQ/L (ref 23–33)
CO2: 27 MEQ/L (ref 23–33)
CREAT SERPL-MCNC: 0.5 MG/DL (ref 0.4–1.2)
CREAT SERPL-MCNC: 0.5 MG/DL (ref 0.4–1.2)
EOSINOPHIL # BLD: 0.9 %
EOSINOPHILS ABSOLUTE: 0.1 THOU/MM3 (ref 0–0.4)
GFR SERPL CREATININE-BSD FRML MDRD: > 90 ML/MIN/1.73M2
GFR SERPL CREATININE-BSD FRML MDRD: > 90 ML/MIN/1.73M2
GLUCOSE BLD-MCNC: 109 MG/DL (ref 70–108)
GLUCOSE BLD-MCNC: 120 MG/DL (ref 70–108)
HCT VFR BLD CALC: 26.3 % (ref 37–47)
HEMOGLOBIN: 8.7 GM/DL (ref 12–16)
LYMPHOCYTES # BLD: 15 %
LYMPHOCYTES ABSOLUTE: 1.3 THOU/MM3 (ref 1–4.8)
MCH RBC QN AUTO: 30.2 PG (ref 27–31)
MCHC RBC AUTO-ENTMCNC: 33 GM/DL (ref 33–37)
MCV RBC AUTO: 91.6 FL (ref 81–99)
MONOCYTES # BLD: 13.7 %
MONOCYTES ABSOLUTE: 1.2 THOU/MM3 (ref 0.4–1.3)
NUCLEATED RED BLOOD CELLS: 0 /100 WBC
ORGANISM: ABNORMAL
PDW BLD-RTO: 16.1 % (ref 11.5–14.5)
PLATELET # BLD: 100 THOU/MM3 (ref 130–400)
PMV BLD AUTO: 9.4 MCM (ref 7.4–10.4)
POTASSIUM SERPL-SCNC: 3.1 MEQ/L (ref 3.5–5.2)
POTASSIUM SERPL-SCNC: 3.7 MEQ/L (ref 3.5–5.2)
RBC # BLD: 2.87 MILL/MM3 (ref 4.2–5.4)
RBC # BLD: NORMAL 10*6/UL
SEG NEUTROPHILS: 70.1 %
SEGMENTED NEUTROPHILS ABSOLUTE COUNT: 6.2 THOU/MM3 (ref 1.8–7.7)
SODIUM BLD-SCNC: 138 MEQ/L (ref 135–145)
SODIUM BLD-SCNC: 138 MEQ/L (ref 135–145)
URINE CULTURE REFLEX: ABNORMAL
WBC # BLD: 8.9 THOU/MM3 (ref 4.8–10.8)

## 2017-07-21 PROCEDURE — 97530 THERAPEUTIC ACTIVITIES: CPT

## 2017-07-21 PROCEDURE — 97163 PT EVAL HIGH COMPLEX 45 MIN: CPT

## 2017-07-21 PROCEDURE — 99232 SBSQ HOSP IP/OBS MODERATE 35: CPT | Performed by: PHYSICIAN ASSISTANT

## 2017-07-21 PROCEDURE — 6370000000 HC RX 637 (ALT 250 FOR IP): Performed by: NURSE PRACTITIONER

## 2017-07-21 PROCEDURE — 6360000002 HC RX W HCPCS: Performed by: PHYSICIAN ASSISTANT

## 2017-07-21 PROCEDURE — 85025 COMPLETE CBC W/AUTO DIFF WBC: CPT

## 2017-07-21 PROCEDURE — 82330 ASSAY OF CALCIUM: CPT

## 2017-07-21 PROCEDURE — 6370000000 HC RX 637 (ALT 250 FOR IP): Performed by: SURGERY

## 2017-07-21 PROCEDURE — 80048 BASIC METABOLIC PNL TOTAL CA: CPT

## 2017-07-21 PROCEDURE — 99999 PR OFFICE/OUTPT VISIT,PROCEDURE ONLY: CPT | Performed by: NURSE PRACTITIONER

## 2017-07-21 PROCEDURE — 2580000003 HC RX 258: Performed by: FAMILY MEDICINE

## 2017-07-21 PROCEDURE — 36415 COLL VENOUS BLD VENIPUNCTURE: CPT

## 2017-07-21 PROCEDURE — 96125 COGNITIVE TEST BY HC PRO: CPT

## 2017-07-21 PROCEDURE — 2580000003 HC RX 258: Performed by: PHYSICIAN ASSISTANT

## 2017-07-21 PROCEDURE — 97166 OT EVAL MOD COMPLEX 45 MIN: CPT

## 2017-07-21 PROCEDURE — 2580000003 HC RX 258: Performed by: SURGERY

## 2017-07-21 PROCEDURE — G8978 MOBILITY CURRENT STATUS: HCPCS

## 2017-07-21 PROCEDURE — 1200000000 HC SEMI PRIVATE

## 2017-07-21 PROCEDURE — G8979 MOBILITY GOAL STATUS: HCPCS

## 2017-07-21 PROCEDURE — 99232 SBSQ HOSP IP/OBS MODERATE 35: CPT | Performed by: SURGERY

## 2017-07-21 PROCEDURE — 94010 BREATHING CAPACITY TEST: CPT

## 2017-07-21 RX ORDER — OXYCODONE HYDROCHLORIDE AND ACETAMINOPHEN 5; 325 MG/1; MG/1
2 TABLET ORAL EVERY 4 HOURS PRN
Status: DISCONTINUED | OUTPATIENT
Start: 2017-07-21 | End: 2017-07-22

## 2017-07-21 RX ORDER — OXYCODONE HYDROCHLORIDE AND ACETAMINOPHEN 5; 325 MG/1; MG/1
1 TABLET ORAL EVERY 4 HOURS PRN
Status: DISCONTINUED | OUTPATIENT
Start: 2017-07-21 | End: 2017-07-22

## 2017-07-21 RX ORDER — FAMOTIDINE 20 MG/1
20 TABLET, FILM COATED ORAL 2 TIMES DAILY
Status: DISCONTINUED | OUTPATIENT
Start: 2017-07-21 | End: 2017-07-24 | Stop reason: HOSPADM

## 2017-07-21 RX ORDER — POLYETHYLENE GLYCOL 3350 17 G/17G
17 POWDER, FOR SOLUTION ORAL DAILY
Status: DISCONTINUED | OUTPATIENT
Start: 2017-07-21 | End: 2017-07-24 | Stop reason: HOSPADM

## 2017-07-21 RX ORDER — POTASSIUM CHLORIDE 20 MEQ/1
40 TABLET, EXTENDED RELEASE ORAL ONCE
Status: COMPLETED | OUTPATIENT
Start: 2017-07-22 | End: 2017-07-22

## 2017-07-21 RX ADMIN — CALCIUM GLUCONATE 1.5 G: 94 INJECTION, SOLUTION INTRAVENOUS at 21:31

## 2017-07-21 RX ADMIN — METOPROLOL TARTRATE 25 MG: 25 TABLET ORAL at 20:14

## 2017-07-21 RX ADMIN — LOSARTAN POTASSIUM 100 MG: 100 TABLET, FILM COATED ORAL at 09:20

## 2017-07-21 RX ADMIN — CIPROFLOXACIN HYDROCHLORIDE 500 MG: 500 TABLET, FILM COATED ORAL at 09:20

## 2017-07-21 RX ADMIN — ACETAMINOPHEN 650 MG: 325 TABLET ORAL at 23:17

## 2017-07-21 RX ADMIN — SODIUM CHLORIDE: 9 INJECTION, SOLUTION INTRAVENOUS at 06:46

## 2017-07-21 RX ADMIN — ACETAMINOPHEN 650 MG: 325 TABLET ORAL at 18:30

## 2017-07-21 RX ADMIN — FAMOTIDINE 20 MG: 20 TABLET, FILM COATED ORAL at 21:31

## 2017-07-21 RX ADMIN — METOPROLOL TARTRATE 25 MG: 25 TABLET ORAL at 09:20

## 2017-07-21 RX ADMIN — CIPROFLOXACIN HYDROCHLORIDE 500 MG: 500 TABLET, FILM COATED ORAL at 21:09

## 2017-07-21 RX ADMIN — BACITRACIN: 500 OINTMENT TOPICAL at 09:20

## 2017-07-21 RX ADMIN — HYDROCHLOROTHIAZIDE 12.5 MG: 25 TABLET ORAL at 09:19

## 2017-07-21 RX ADMIN — DOCUSATE SODIUM 100 MG: 100 CAPSULE ORAL at 21:09

## 2017-07-21 RX ADMIN — Medication 10 ML: at 21:32

## 2017-07-21 RX ADMIN — BUMETANIDE 1 MG: 1 TABLET ORAL at 09:20

## 2017-07-21 RX ADMIN — BACITRACIN: 500 OINTMENT TOPICAL at 21:11

## 2017-07-21 RX ADMIN — CHLORASEPTIC 1 SPRAY: 1.5 LIQUID ORAL at 06:02

## 2017-07-21 RX ADMIN — DOCUSATE SODIUM 100 MG: 100 CAPSULE ORAL at 09:20

## 2017-07-21 ASSESSMENT — PAIN DESCRIPTION - LOCATION
LOCATION: SHOULDER
LOCATION: SHOULDER
LOCATION: SHOULDER;INCISION
LOCATION: SHOULDER

## 2017-07-21 ASSESSMENT — PAIN DESCRIPTION - PAIN TYPE
TYPE: SURGICAL PAIN

## 2017-07-21 ASSESSMENT — PAIN SCALES - GENERAL
PAINLEVEL_OUTOF10: 2
PAINLEVEL_OUTOF10: 0
PAINLEVEL_OUTOF10: 5
PAINLEVEL_OUTOF10: 5
PAINLEVEL_OUTOF10: 7
PAINLEVEL_OUTOF10: 2
PAINLEVEL_OUTOF10: 3
PAINLEVEL_OUTOF10: 5

## 2017-07-21 ASSESSMENT — PAIN DESCRIPTION - DESCRIPTORS
DESCRIPTORS: ACHING;SORE
DESCRIPTORS: ACHING

## 2017-07-21 ASSESSMENT — PAIN DESCRIPTION - ORIENTATION
ORIENTATION: RIGHT
ORIENTATION: ANTERIOR;RIGHT
ORIENTATION: RIGHT
ORIENTATION: RIGHT;ANTERIOR;OUTER

## 2017-07-21 ASSESSMENT — PAIN DESCRIPTION - FREQUENCY
FREQUENCY: CONTINUOUS
FREQUENCY: CONTINUOUS

## 2017-07-22 LAB
ANISOCYTOSIS: ABNORMAL
BASOPHILS # BLD: 0.6 %
BASOPHILS ABSOLUTE: 0.1 THOU/MM3 (ref 0–0.1)
CALCIUM IONIZED: 1.09 MMOL/L (ref 1.12–1.32)
EOSINOPHIL # BLD: 4.5 %
EOSINOPHILS ABSOLUTE: 0.4 THOU/MM3 (ref 0–0.4)
HCT VFR BLD CALC: 26.4 % (ref 37–47)
HEMOGLOBIN: 8.9 GM/DL (ref 12–16)
LYMPHOCYTES # BLD: 17 %
LYMPHOCYTES ABSOLUTE: 1.5 THOU/MM3 (ref 1–4.8)
MCH RBC QN AUTO: 30.6 PG (ref 27–31)
MCHC RBC AUTO-ENTMCNC: 33.7 GM/DL (ref 33–37)
MCV RBC AUTO: 90.8 FL (ref 81–99)
MONOCYTES # BLD: 13.5 %
MONOCYTES ABSOLUTE: 1.2 THOU/MM3 (ref 0.4–1.3)
NUCLEATED RED BLOOD CELLS: 0 /100 WBC
PDW BLD-RTO: 15.9 % (ref 11.5–14.5)
PLATELET # BLD: 106 THOU/MM3 (ref 130–400)
PMV BLD AUTO: 9.1 MCM (ref 7.4–10.4)
POTASSIUM SERPL-SCNC: 3.9 MEQ/L (ref 3.5–5.2)
RBC # BLD: 2.91 MILL/MM3 (ref 4.2–5.4)
RBC # BLD: NORMAL 10*6/UL
SEG NEUTROPHILS: 64.4 %
SEGMENTED NEUTROPHILS ABSOLUTE COUNT: 5.6 THOU/MM3 (ref 1.8–7.7)
WBC # BLD: 8.7 THOU/MM3 (ref 4.8–10.8)

## 2017-07-22 PROCEDURE — 85025 COMPLETE CBC W/AUTO DIFF WBC: CPT

## 2017-07-22 PROCEDURE — 6360000002 HC RX W HCPCS: Performed by: PHYSICIAN ASSISTANT

## 2017-07-22 PROCEDURE — 2580000003 HC RX 258: Performed by: SURGERY

## 2017-07-22 PROCEDURE — 6370000000 HC RX 637 (ALT 250 FOR IP): Performed by: SURGERY

## 2017-07-22 PROCEDURE — 99232 SBSQ HOSP IP/OBS MODERATE 35: CPT | Performed by: PHYSICIAN ASSISTANT

## 2017-07-22 PROCEDURE — 82330 ASSAY OF CALCIUM: CPT

## 2017-07-22 PROCEDURE — 6370000000 HC RX 637 (ALT 250 FOR IP): Performed by: NURSE PRACTITIONER

## 2017-07-22 PROCEDURE — 97530 THERAPEUTIC ACTIVITIES: CPT

## 2017-07-22 PROCEDURE — 97110 THERAPEUTIC EXERCISES: CPT

## 2017-07-22 PROCEDURE — 2580000003 HC RX 258: Performed by: PHYSICIAN ASSISTANT

## 2017-07-22 PROCEDURE — 36415 COLL VENOUS BLD VENIPUNCTURE: CPT

## 2017-07-22 PROCEDURE — 99232 SBSQ HOSP IP/OBS MODERATE 35: CPT | Performed by: SURGERY

## 2017-07-22 PROCEDURE — 84132 ASSAY OF SERUM POTASSIUM: CPT

## 2017-07-22 PROCEDURE — 94010 BREATHING CAPACITY TEST: CPT

## 2017-07-22 PROCEDURE — 1200000000 HC SEMI PRIVATE

## 2017-07-22 PROCEDURE — 6370000000 HC RX 637 (ALT 250 FOR IP): Performed by: PHYSICIAN ASSISTANT

## 2017-07-22 RX ORDER — HYDROCODONE BITARTRATE AND ACETAMINOPHEN 5; 325 MG/1; MG/1
2 TABLET ORAL EVERY 4 HOURS PRN
Status: DISCONTINUED | OUTPATIENT
Start: 2017-07-22 | End: 2017-07-24 | Stop reason: HOSPADM

## 2017-07-22 RX ORDER — HYDROCODONE BITARTRATE AND ACETAMINOPHEN 5; 325 MG/1; MG/1
1 TABLET ORAL EVERY 4 HOURS PRN
Status: DISCONTINUED | OUTPATIENT
Start: 2017-07-22 | End: 2017-07-24 | Stop reason: HOSPADM

## 2017-07-22 RX ADMIN — LOSARTAN POTASSIUM 100 MG: 100 TABLET, FILM COATED ORAL at 08:00

## 2017-07-22 RX ADMIN — HYDROCHLOROTHIAZIDE 12.5 MG: 25 TABLET ORAL at 07:57

## 2017-07-22 RX ADMIN — POTASSIUM CHLORIDE 40 MEQ: 1500 TABLET, EXTENDED RELEASE ORAL at 01:10

## 2017-07-22 RX ADMIN — FAMOTIDINE 20 MG: 20 TABLET, FILM COATED ORAL at 08:00

## 2017-07-22 RX ADMIN — DOCUSATE SODIUM 100 MG: 100 CAPSULE ORAL at 20:29

## 2017-07-22 RX ADMIN — DOCUSATE SODIUM 100 MG: 100 CAPSULE ORAL at 08:00

## 2017-07-22 RX ADMIN — BUMETANIDE 1 MG: 1 TABLET ORAL at 08:00

## 2017-07-22 RX ADMIN — FAMOTIDINE 20 MG: 20 TABLET, FILM COATED ORAL at 20:29

## 2017-07-22 RX ADMIN — Medication 10 ML: at 08:00

## 2017-07-22 RX ADMIN — HYDROCODONE BITARTRATE AND ACETAMINOPHEN 1 TABLET: 5; 325 TABLET ORAL at 15:04

## 2017-07-22 RX ADMIN — METOPROLOL TARTRATE 25 MG: 25 TABLET ORAL at 08:00

## 2017-07-22 RX ADMIN — POLYETHYLENE GLYCOL 3350 17 G: 17 POWDER, FOR SOLUTION ORAL at 08:00

## 2017-07-22 RX ADMIN — BACITRACIN: 500 OINTMENT TOPICAL at 20:30

## 2017-07-22 RX ADMIN — METOPROLOL TARTRATE 25 MG: 25 TABLET ORAL at 20:29

## 2017-07-22 RX ADMIN — CIPROFLOXACIN HYDROCHLORIDE 500 MG: 500 TABLET, FILM COATED ORAL at 20:29

## 2017-07-22 RX ADMIN — OXYCODONE HYDROCHLORIDE AND ACETAMINOPHEN 1 TABLET: 5; 325 TABLET ORAL at 08:12

## 2017-07-22 RX ADMIN — ACETAMINOPHEN 650 MG: 325 TABLET ORAL at 04:39

## 2017-07-22 RX ADMIN — CIPROFLOXACIN HYDROCHLORIDE 500 MG: 500 TABLET, FILM COATED ORAL at 08:00

## 2017-07-22 RX ADMIN — CALCIUM GLUCONATE 1.5 G: 94 INJECTION, SOLUTION INTRAVENOUS at 08:07

## 2017-07-22 RX ADMIN — HYDROCODONE BITARTRATE AND ACETAMINOPHEN 1 TABLET: 5; 325 TABLET ORAL at 23:02

## 2017-07-22 RX ADMIN — Medication 10 ML: at 20:39

## 2017-07-22 RX ADMIN — BACITRACIN: 500 OINTMENT TOPICAL at 08:00

## 2017-07-22 ASSESSMENT — PAIN SCALES - GENERAL
PAINLEVEL_OUTOF10: 4
PAINLEVEL_OUTOF10: 0
PAINLEVEL_OUTOF10: 5
PAINLEVEL_OUTOF10: 2
PAINLEVEL_OUTOF10: 3
PAINLEVEL_OUTOF10: 4
PAINLEVEL_OUTOF10: 4
PAINLEVEL_OUTOF10: 3
PAINLEVEL_OUTOF10: 2
PAINLEVEL_OUTOF10: 4

## 2017-07-22 ASSESSMENT — PAIN DESCRIPTION - PAIN TYPE
TYPE: ACUTE PAIN;SURGICAL PAIN
TYPE: SURGICAL PAIN
TYPE: ACUTE PAIN;SURGICAL PAIN

## 2017-07-22 ASSESSMENT — PAIN DESCRIPTION - ONSET
ONSET: ON-GOING

## 2017-07-22 ASSESSMENT — PAIN DESCRIPTION - PROGRESSION
CLINICAL_PROGRESSION: NOT CHANGED

## 2017-07-22 ASSESSMENT — PAIN DESCRIPTION - DESCRIPTORS
DESCRIPTORS: ACHING

## 2017-07-22 ASSESSMENT — PAIN DESCRIPTION - FREQUENCY
FREQUENCY: CONTINUOUS

## 2017-07-22 ASSESSMENT — PAIN DESCRIPTION - ORIENTATION
ORIENTATION: RIGHT

## 2017-07-22 ASSESSMENT — PAIN DESCRIPTION - LOCATION
LOCATION: SHOULDER

## 2017-07-23 LAB
ANION GAP SERPL CALCULATED.3IONS-SCNC: 14 MEQ/L (ref 8–16)
ANISOCYTOSIS: ABNORMAL
BASOPHILS # BLD: 0.4 %
BASOPHILS ABSOLUTE: 0 THOU/MM3 (ref 0–0.1)
BUN BLDV-MCNC: 10 MG/DL (ref 7–22)
CALCIUM SERPL-MCNC: 8.1 MG/DL (ref 8.5–10.5)
CHLORIDE BLD-SCNC: 94 MEQ/L (ref 98–111)
CO2: 26 MEQ/L (ref 23–33)
CREAT SERPL-MCNC: 0.4 MG/DL (ref 0.4–1.2)
EOSINOPHIL # BLD: 2.2 %
EOSINOPHILS ABSOLUTE: 0.2 THOU/MM3 (ref 0–0.4)
GFR SERPL CREATININE-BSD FRML MDRD: > 90 ML/MIN/1.73M2
GLUCOSE BLD-MCNC: 125 MG/DL (ref 70–108)
HCT VFR BLD CALC: 29.3 % (ref 37–47)
HEMOGLOBIN: 10.1 GM/DL (ref 12–16)
LYMPHOCYTES # BLD: 10.7 %
LYMPHOCYTES ABSOLUTE: 1.1 THOU/MM3 (ref 1–4.8)
MCH RBC QN AUTO: 31 PG (ref 27–31)
MCHC RBC AUTO-ENTMCNC: 34.3 GM/DL (ref 33–37)
MCV RBC AUTO: 90.5 FL (ref 81–99)
MONOCYTES # BLD: 10.5 %
MONOCYTES ABSOLUTE: 1 THOU/MM3 (ref 0.4–1.3)
NUCLEATED RED BLOOD CELLS: 0 /100 WBC
PDW BLD-RTO: 15.7 % (ref 11.5–14.5)
PLATELET # BLD: 143 THOU/MM3 (ref 130–400)
PMV BLD AUTO: 9.1 MCM (ref 7.4–10.4)
POTASSIUM SERPL-SCNC: 3.5 MEQ/L (ref 3.5–5.2)
RBC # BLD: 3.24 MILL/MM3 (ref 4.2–5.4)
RBC # BLD: NORMAL 10*6/UL
SEG NEUTROPHILS: 76.2 %
SEGMENTED NEUTROPHILS ABSOLUTE COUNT: 7.5 THOU/MM3 (ref 1.8–7.7)
SODIUM BLD-SCNC: 134 MEQ/L (ref 135–145)
WBC # BLD: 9.9 THOU/MM3 (ref 4.8–10.8)

## 2017-07-23 PROCEDURE — 6370000000 HC RX 637 (ALT 250 FOR IP): Performed by: PHYSICIAN ASSISTANT

## 2017-07-23 PROCEDURE — 6370000000 HC RX 637 (ALT 250 FOR IP): Performed by: NURSE PRACTITIONER

## 2017-07-23 PROCEDURE — 1200000000 HC SEMI PRIVATE

## 2017-07-23 PROCEDURE — 36415 COLL VENOUS BLD VENIPUNCTURE: CPT

## 2017-07-23 PROCEDURE — 94010 BREATHING CAPACITY TEST: CPT

## 2017-07-23 PROCEDURE — 6370000000 HC RX 637 (ALT 250 FOR IP): Performed by: SURGERY

## 2017-07-23 PROCEDURE — 80048 BASIC METABOLIC PNL TOTAL CA: CPT

## 2017-07-23 PROCEDURE — 85025 COMPLETE CBC W/AUTO DIFF WBC: CPT

## 2017-07-23 PROCEDURE — 2580000003 HC RX 258: Performed by: SURGERY

## 2017-07-23 PROCEDURE — 99232 SBSQ HOSP IP/OBS MODERATE 35: CPT | Performed by: SURGERY

## 2017-07-23 PROCEDURE — 99232 SBSQ HOSP IP/OBS MODERATE 35: CPT | Performed by: PHYSICIAN ASSISTANT

## 2017-07-23 RX ORDER — LABETALOL HYDROCHLORIDE 5 MG/ML
10 INJECTION, SOLUTION INTRAVENOUS EVERY 4 HOURS PRN
Status: DISCONTINUED | OUTPATIENT
Start: 2017-07-23 | End: 2017-07-24 | Stop reason: HOSPADM

## 2017-07-23 RX ADMIN — FAMOTIDINE 20 MG: 20 TABLET, FILM COATED ORAL at 08:00

## 2017-07-23 RX ADMIN — ACETAMINOPHEN 650 MG: 325 TABLET ORAL at 23:42

## 2017-07-23 RX ADMIN — FAMOTIDINE 20 MG: 20 TABLET, FILM COATED ORAL at 20:03

## 2017-07-23 RX ADMIN — DOCUSATE SODIUM 100 MG: 100 CAPSULE ORAL at 20:03

## 2017-07-23 RX ADMIN — METOPROLOL TARTRATE 25 MG: 25 TABLET ORAL at 07:57

## 2017-07-23 RX ADMIN — METOPROLOL TARTRATE 25 MG: 25 TABLET ORAL at 20:03

## 2017-07-23 RX ADMIN — DOCUSATE SODIUM 100 MG: 100 CAPSULE ORAL at 08:00

## 2017-07-23 RX ADMIN — ACETAMINOPHEN 650 MG: 325 TABLET ORAL at 17:17

## 2017-07-23 RX ADMIN — LOSARTAN POTASSIUM 100 MG: 100 TABLET, FILM COATED ORAL at 07:57

## 2017-07-23 RX ADMIN — HYDROCODONE BITARTRATE AND ACETAMINOPHEN 1 TABLET: 5; 325 TABLET ORAL at 06:05

## 2017-07-23 RX ADMIN — Medication 10 ML: at 08:00

## 2017-07-23 RX ADMIN — POLYETHYLENE GLYCOL 3350 17 G: 17 POWDER, FOR SOLUTION ORAL at 07:57

## 2017-07-23 RX ADMIN — BUMETANIDE 1 MG: 1 TABLET ORAL at 07:57

## 2017-07-23 RX ADMIN — BACITRACIN: 500 OINTMENT TOPICAL at 20:03

## 2017-07-23 RX ADMIN — Medication 10 ML: at 20:06

## 2017-07-23 RX ADMIN — HYDROCHLOROTHIAZIDE 12.5 MG: 25 TABLET ORAL at 07:57

## 2017-07-23 RX ADMIN — CIPROFLOXACIN HYDROCHLORIDE 500 MG: 500 TABLET, FILM COATED ORAL at 08:00

## 2017-07-23 ASSESSMENT — PAIN SCALES - GENERAL
PAINLEVEL_OUTOF10: 0
PAINLEVEL_OUTOF10: 0
PAINLEVEL_OUTOF10: 3
PAINLEVEL_OUTOF10: 0
PAINLEVEL_OUTOF10: 3
PAINLEVEL_OUTOF10: 10
PAINLEVEL_OUTOF10: 0
PAINLEVEL_OUTOF10: 3
PAINLEVEL_OUTOF10: 0

## 2017-07-23 ASSESSMENT — PAIN DESCRIPTION - PROGRESSION
CLINICAL_PROGRESSION: NOT CHANGED

## 2017-07-23 ASSESSMENT — PAIN DESCRIPTION - FREQUENCY
FREQUENCY: CONTINUOUS

## 2017-07-23 ASSESSMENT — PAIN DESCRIPTION - ONSET
ONSET: ON-GOING

## 2017-07-23 ASSESSMENT — PAIN DESCRIPTION - DESCRIPTORS
DESCRIPTORS: ACHING

## 2017-07-23 ASSESSMENT — PAIN DESCRIPTION - LOCATION
LOCATION: ARM

## 2017-07-23 ASSESSMENT — PAIN DESCRIPTION - ORIENTATION
ORIENTATION: RIGHT

## 2017-07-23 ASSESSMENT — PAIN DESCRIPTION - PAIN TYPE
TYPE: ACUTE PAIN;SURGICAL PAIN

## 2017-07-24 VITALS
OXYGEN SATURATION: 96 % | TEMPERATURE: 98.4 F | BODY MASS INDEX: 25.6 KG/M2 | HEIGHT: 70 IN | HEART RATE: 64 BPM | WEIGHT: 178.8 LBS | RESPIRATION RATE: 16 BRPM | SYSTOLIC BLOOD PRESSURE: 124 MMHG | DIASTOLIC BLOOD PRESSURE: 61 MMHG

## 2017-07-24 LAB
ANION GAP SERPL CALCULATED.3IONS-SCNC: 17 MEQ/L (ref 8–16)
BUN BLDV-MCNC: 11 MG/DL (ref 7–22)
CALCIUM SERPL-MCNC: 8.6 MG/DL (ref 8.5–10.5)
CHLORIDE BLD-SCNC: 96 MEQ/L (ref 98–111)
CO2: 25 MEQ/L (ref 23–33)
CREAT SERPL-MCNC: 0.5 MG/DL (ref 0.4–1.2)
GFR SERPL CREATININE-BSD FRML MDRD: > 90 ML/MIN/1.73M2
GLUCOSE BLD-MCNC: 117 MG/DL (ref 70–108)
HCT VFR BLD CALC: 29.4 % (ref 37–47)
HEMOGLOBIN: 10.1 GM/DL (ref 12–16)
MCH RBC QN AUTO: 30.9 PG (ref 27–31)
MCHC RBC AUTO-ENTMCNC: 34.2 GM/DL (ref 33–37)
MCV RBC AUTO: 90.4 FL (ref 81–99)
PDW BLD-RTO: 15.8 % (ref 11.5–14.5)
PLATELET # BLD: 195 THOU/MM3 (ref 130–400)
PMV BLD AUTO: 8.5 MCM (ref 7.4–10.4)
POTASSIUM SERPL-SCNC: 3 MEQ/L (ref 3.5–5.2)
RBC # BLD: 3.26 MILL/MM3 (ref 4.2–5.4)
SODIUM BLD-SCNC: 138 MEQ/L (ref 135–145)
WBC # BLD: 8.8 THOU/MM3 (ref 4.8–10.8)

## 2017-07-24 PROCEDURE — 99999 PR OFFICE/OUTPT VISIT,PROCEDURE ONLY: CPT | Performed by: NURSE PRACTITIONER

## 2017-07-24 PROCEDURE — 97530 THERAPEUTIC ACTIVITIES: CPT

## 2017-07-24 PROCEDURE — 2500000003 HC RX 250 WO HCPCS: Performed by: ANESTHESIOLOGY

## 2017-07-24 PROCEDURE — 6370000000 HC RX 637 (ALT 250 FOR IP): Performed by: SURGERY

## 2017-07-24 PROCEDURE — 85027 COMPLETE CBC AUTOMATED: CPT

## 2017-07-24 PROCEDURE — 97110 THERAPEUTIC EXERCISES: CPT

## 2017-07-24 PROCEDURE — 2580000003 HC RX 258: Performed by: SURGERY

## 2017-07-24 PROCEDURE — 80048 BASIC METABOLIC PNL TOTAL CA: CPT

## 2017-07-24 PROCEDURE — 99239 HOSP IP/OBS DSCHRG MGMT >30: CPT | Performed by: SURGERY

## 2017-07-24 PROCEDURE — 6370000000 HC RX 637 (ALT 250 FOR IP): Performed by: NURSE PRACTITIONER

## 2017-07-24 PROCEDURE — 36415 COLL VENOUS BLD VENIPUNCTURE: CPT

## 2017-07-24 PROCEDURE — 6360000002 HC RX W HCPCS: Performed by: SURGERY

## 2017-07-24 PROCEDURE — 99232 SBSQ HOSP IP/OBS MODERATE 35: CPT | Performed by: NURSE PRACTITIONER

## 2017-07-24 RX ORDER — PSEUDOEPHEDRINE HCL 30 MG
100 TABLET ORAL 2 TIMES DAILY
DISCHARGE
Start: 2017-07-24 | End: 2017-10-13 | Stop reason: ALTCHOICE

## 2017-07-24 RX ORDER — LIDOCAINE 50 MG/G
2 PATCH TOPICAL NIGHTLY
Qty: 30 PATCH | Refills: 0 | DISCHARGE
Start: 2017-07-24 | End: 2017-10-13 | Stop reason: ALTCHOICE

## 2017-07-24 RX ORDER — ACETAMINOPHEN 325 MG/1
650 TABLET ORAL EVERY 4 HOURS PRN
Qty: 120 TABLET | Refills: 3 | Status: ON HOLD | DISCHARGE
Start: 2017-07-24 | End: 2018-01-19

## 2017-07-24 RX ORDER — POTASSIUM CHLORIDE 7.45 MG/ML
10 INJECTION INTRAVENOUS
Status: DISCONTINUED | OUTPATIENT
Start: 2017-07-24 | End: 2017-07-24 | Stop reason: HOSPADM

## 2017-07-24 RX ORDER — POLYETHYLENE GLYCOL 3350 17 G/17G
17 POWDER, FOR SOLUTION ORAL DAILY
Qty: 527 G | Refills: 1 | DISCHARGE
Start: 2017-07-24 | End: 2017-08-23

## 2017-07-24 RX ORDER — POTASSIUM CHLORIDE 20 MEQ/1
20 TABLET, EXTENDED RELEASE ORAL DAILY
Qty: 60 TABLET | Refills: 3 | DISCHARGE
Start: 2017-07-24 | End: 2018-05-03 | Stop reason: ALTCHOICE

## 2017-07-24 RX ADMIN — POTASSIUM CHLORIDE 10 MEQ: 7.46 INJECTION, SOLUTION INTRAVENOUS at 12:57

## 2017-07-24 RX ADMIN — HYDROCHLOROTHIAZIDE 12.5 MG: 25 TABLET ORAL at 08:28

## 2017-07-24 RX ADMIN — LABETALOL HYDROCHLORIDE 10 MG: 5 INJECTION, SOLUTION INTRAVENOUS at 00:02

## 2017-07-24 RX ADMIN — BUMETANIDE 1 MG: 1 TABLET ORAL at 08:28

## 2017-07-24 RX ADMIN — POTASSIUM CHLORIDE 10 MEQ: 7.46 INJECTION, SOLUTION INTRAVENOUS at 11:28

## 2017-07-24 RX ADMIN — DOCUSATE SODIUM 100 MG: 100 CAPSULE ORAL at 08:28

## 2017-07-24 RX ADMIN — ACETAMINOPHEN 650 MG: 325 TABLET ORAL at 08:26

## 2017-07-24 RX ADMIN — METOPROLOL TARTRATE 25 MG: 25 TABLET ORAL at 08:28

## 2017-07-24 RX ADMIN — FAMOTIDINE 20 MG: 20 TABLET, FILM COATED ORAL at 08:27

## 2017-07-24 RX ADMIN — POLYETHYLENE GLYCOL 3350 17 G: 17 POWDER, FOR SOLUTION ORAL at 08:27

## 2017-07-24 RX ADMIN — Medication 10 ML: at 11:28

## 2017-07-24 RX ADMIN — LOSARTAN POTASSIUM 100 MG: 100 TABLET, FILM COATED ORAL at 08:28

## 2017-07-24 RX ADMIN — POTASSIUM CHLORIDE 10 MEQ: 7.46 INJECTION, SOLUTION INTRAVENOUS at 14:00

## 2017-07-24 ASSESSMENT — PAIN SCALES - GENERAL
PAINLEVEL_OUTOF10: 10
PAINLEVEL_OUTOF10: 5
PAINLEVEL_OUTOF10: 10
PAINLEVEL_OUTOF10: 10
PAINLEVEL_OUTOF10: 0

## 2017-07-24 ASSESSMENT — PAIN DESCRIPTION - DESCRIPTORS: DESCRIPTORS: ACHING

## 2017-07-24 ASSESSMENT — PAIN DESCRIPTION - LOCATION: LOCATION: ARM

## 2017-07-24 ASSESSMENT — PAIN DESCRIPTION - PAIN TYPE
TYPE: SURGICAL PAIN

## 2017-07-24 ASSESSMENT — PAIN DESCRIPTION - ORIENTATION: ORIENTATION: RIGHT

## 2017-07-24 ASSESSMENT — PAIN DESCRIPTION - ONSET: ONSET: ON-GOING

## 2017-07-24 ASSESSMENT — PAIN DESCRIPTION - FREQUENCY: FREQUENCY: CONTINUOUS

## 2017-08-24 ENCOUNTER — OFFICE VISIT (OUTPATIENT)
Dept: UROLOGY | Age: 82
End: 2017-08-24
Payer: MEDICARE

## 2017-08-24 VITALS
WEIGHT: 170 LBS | SYSTOLIC BLOOD PRESSURE: 152 MMHG | BODY MASS INDEX: 23.03 KG/M2 | HEIGHT: 72 IN | DIASTOLIC BLOOD PRESSURE: 75 MMHG

## 2017-08-24 DIAGNOSIS — N28.89 PERINEPHRIC FLUID COLLECTION: Primary | ICD-10-CM

## 2017-08-24 DIAGNOSIS — B37.31 VAGINAL CANDIDIASIS: ICD-10-CM

## 2017-08-24 PROCEDURE — 99213 OFFICE O/P EST LOW 20 MIN: CPT | Performed by: PHYSICIAN ASSISTANT

## 2017-08-24 RX ORDER — FLUCONAZOLE 150 MG/1
150 TABLET ORAL ONCE
Qty: 1 TABLET | Refills: 0 | Status: SHIPPED | OUTPATIENT
Start: 2017-08-24 | End: 2017-08-24

## 2017-09-15 ENCOUNTER — HOSPITAL ENCOUNTER (OUTPATIENT)
Dept: ULTRASOUND IMAGING | Age: 82
Discharge: HOME | End: 2017-09-15
Payer: MEDICARE

## 2017-09-15 ENCOUNTER — TELEPHONE (OUTPATIENT)
Dept: UROLOGY | Age: 82
End: 2017-09-15

## 2017-09-15 DIAGNOSIS — N28.89 PERINEPHRIC FLUID COLLECTION: ICD-10-CM

## 2017-09-15 PROCEDURE — 76775 US EXAM ABDO BACK WALL LIM: CPT

## 2017-09-15 NOTE — TELEPHONE ENCOUNTER
Recent renal US reveals left renal cysts but resolved perinephric fluid collection. No additional studies are needed at this time.

## 2017-09-25 ENCOUNTER — TELEPHONE (OUTPATIENT)
Dept: UROLOGY | Age: 82
End: 2017-09-25

## 2017-10-13 ENCOUNTER — OFFICE VISIT (OUTPATIENT)
Dept: FAMILY MEDICINE CLINIC | Age: 82
End: 2017-10-13

## 2017-10-13 VITALS
HEART RATE: 68 BPM | WEIGHT: 168.5 LBS | SYSTOLIC BLOOD PRESSURE: 140 MMHG | HEIGHT: 72 IN | DIASTOLIC BLOOD PRESSURE: 64 MMHG | BODY MASS INDEX: 22.82 KG/M2 | RESPIRATION RATE: 16 BRPM

## 2017-10-13 DIAGNOSIS — I50.32 CHRONIC DIASTOLIC HEART FAILURE (HCC): ICD-10-CM

## 2017-10-13 DIAGNOSIS — I10 ESSENTIAL HYPERTENSION: Primary | ICD-10-CM

## 2017-10-13 PROCEDURE — 99213 OFFICE O/P EST LOW 20 MIN: CPT | Performed by: FAMILY MEDICINE

## 2017-10-13 ASSESSMENT — ENCOUNTER SYMPTOMS
BACK PAIN: 1
CONSTIPATION: 0
SHORTNESS OF BREATH: 0
SINUS PRESSURE: 0

## 2017-10-13 NOTE — PROGRESS NOTES
Subjective:      Patient ID: Kristi Monterroso is a 80 y.o. female. HPI  1. We discussed the SNF stay  2. She uses some pain meds for pain soreness she has  Review of Systems   Constitutional: Negative for fatigue. HENT: Negative for sinus pressure. Eyes: Negative for visual disturbance. Respiratory: Negative for shortness of breath. Cardiovascular: Negative for chest pain. Gastrointestinal: Negative for constipation. Genitourinary: Positive for difficulty urinating. Musculoskeletal: Positive for back pain and gait problem. Negative for arthralgias. Skin: Negative for rash. Neurological: Positive for weakness. Negative for headaches. The patient's medications, allergies, past medical problems, surgical, social, and family histories were reviewed and updated as needed. Objective:   Physical Exam   Constitutional: She is oriented to person, place, and time. She appears well-developed and well-nourished. No distress. HENT:   Head: Normocephalic and atraumatic. Eyes: Conjunctivae are normal. No scleral icterus. Neck: No tracheal deviation present. Cardiovascular: Normal rate, regular rhythm and normal heart sounds. Pulmonary/Chest: Effort normal and breath sounds normal.   Musculoskeletal: She exhibits no edema. She uses a walker to ambulate   Neurological: She is alert and oriented to person, place, and time. Skin: Skin is warm and dry. Psychiatric: She has a normal mood and affect. Her behavior is normal.     Blood pressure (!) 140/64, pulse 68, resp. rate 16, height 6' (1.829 m), weight 168 lb 8 oz (76.4 kg), not currently breastfeeding. Assessment:      1.  Essential hypertension             Plan:      See me in December still

## 2017-11-08 ENCOUNTER — TELEPHONE (OUTPATIENT)
Dept: PHARMACY | Facility: CLINIC | Age: 82
End: 2017-11-08

## 2017-11-08 NOTE — TELEPHONE ENCOUNTER
CLINICAL PHARMACY: [facility]HER[facility] REVIEW    Identified care gap 90 day conversion per [employer]: Losartan/HCTZ 100-12.5 mg     Per [facility]: Patient has a 90-day supply prescription on file and will be used with next fill. No out reach planned at this time to patient.

## 2017-11-30 ENCOUNTER — OFFICE VISIT (OUTPATIENT)
Dept: UROLOGY | Age: 82
End: 2017-11-30
Payer: MEDICARE

## 2017-11-30 VITALS
BODY MASS INDEX: 25.18 KG/M2 | DIASTOLIC BLOOD PRESSURE: 78 MMHG | HEIGHT: 69 IN | SYSTOLIC BLOOD PRESSURE: 110 MMHG | WEIGHT: 170 LBS

## 2017-11-30 DIAGNOSIS — R35.0 URINARY FREQUENCY: Primary | ICD-10-CM

## 2017-11-30 DIAGNOSIS — N30.00 ACUTE CYSTITIS WITHOUT HEMATURIA: ICD-10-CM

## 2017-11-30 DIAGNOSIS — N28.1: ICD-10-CM

## 2017-11-30 LAB — POST VOID RESIDUAL (PVR): 244 ML

## 2017-11-30 PROCEDURE — G8484 FLU IMMUNIZE NO ADMIN: HCPCS | Performed by: PHYSICIAN ASSISTANT

## 2017-11-30 PROCEDURE — G8417 CALC BMI ABV UP PARAM F/U: HCPCS | Performed by: PHYSICIAN ASSISTANT

## 2017-11-30 PROCEDURE — 99213 OFFICE O/P EST LOW 20 MIN: CPT | Performed by: PHYSICIAN ASSISTANT

## 2017-11-30 PROCEDURE — 1036F TOBACCO NON-USER: CPT | Performed by: PHYSICIAN ASSISTANT

## 2017-11-30 PROCEDURE — 51798 US URINE CAPACITY MEASURE: CPT | Performed by: PHYSICIAN ASSISTANT

## 2017-11-30 PROCEDURE — 4040F PNEUMOC VAC/ADMIN/RCVD: CPT | Performed by: PHYSICIAN ASSISTANT

## 2017-11-30 PROCEDURE — 1123F ACP DISCUSS/DSCN MKR DOCD: CPT | Performed by: PHYSICIAN ASSISTANT

## 2017-11-30 PROCEDURE — 1090F PRES/ABSN URINE INCON ASSESS: CPT | Performed by: PHYSICIAN ASSISTANT

## 2017-11-30 PROCEDURE — G8427 DOCREV CUR MEDS BY ELIG CLIN: HCPCS | Performed by: PHYSICIAN ASSISTANT
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
## 2017-12-11 ENCOUNTER — OFFICE VISIT (OUTPATIENT)
Dept: FAMILY MEDICINE CLINIC | Age: 82
End: 2017-12-11

## 2017-12-11 VITALS
SYSTOLIC BLOOD PRESSURE: 156 MMHG | DIASTOLIC BLOOD PRESSURE: 70 MMHG | HEIGHT: 69 IN | HEART RATE: 76 BPM | RESPIRATION RATE: 16 BRPM | WEIGHT: 171 LBS | BODY MASS INDEX: 25.33 KG/M2

## 2017-12-11 DIAGNOSIS — I50.32 HEART FAILURE, DIASTOLIC, CHRONIC (HCC): ICD-10-CM

## 2017-12-11 DIAGNOSIS — D69.6 ACQUIRED THROMBOCYTOPENIA (HCC): ICD-10-CM

## 2017-12-11 DIAGNOSIS — I10 ESSENTIAL HYPERTENSION: ICD-10-CM

## 2017-12-11 PROCEDURE — 99213 OFFICE O/P EST LOW 20 MIN: CPT | Performed by: FAMILY MEDICINE

## 2017-12-11 RX ORDER — BUMETANIDE 1 MG/1
1 TABLET ORAL DAILY
Qty: 90 TABLET | Refills: 3 | Status: SHIPPED | OUTPATIENT
Start: 2017-12-11 | End: 2018-05-03 | Stop reason: ALTCHOICE

## 2017-12-11 RX ORDER — LOSARTAN POTASSIUM AND HYDROCHLOROTHIAZIDE 12.5; 1 MG/1; MG/1
1 TABLET ORAL DAILY
Qty: 90 TABLET | Refills: 3 | Status: SHIPPED | OUTPATIENT
Start: 2017-12-11 | End: 2018-01-29 | Stop reason: SDUPTHER

## 2017-12-11 ASSESSMENT — ENCOUNTER SYMPTOMS
SINUS PRESSURE: 0
SHORTNESS OF BREATH: 0
CONSTIPATION: 0

## 2018-01-18 ENCOUNTER — HOSPITAL ENCOUNTER (INPATIENT)
Age: 83
LOS: 4 days | Discharge: HOME HEALTH | DRG: 378 | End: 2018-01-22
Attending: INTERNAL MEDICINE | Admitting: INTERNAL MEDICINE
Payer: MEDICARE

## 2018-01-18 DIAGNOSIS — K92.2 LOWER GI BLEED: Primary | ICD-10-CM

## 2018-01-18 LAB
ABO: NORMAL
ALBUMIN SERPL-MCNC: 3.1 G/DL (ref 3.5–5.1)
ALP BLD-CCNC: 120 U/L (ref 38–126)
ALT SERPL-CCNC: 11 U/L (ref 11–66)
ANION GAP SERPL CALCULATED.3IONS-SCNC: 11 MEQ/L (ref 8–16)
ANISOCYTOSIS: ABNORMAL
ANTIBODY SCREEN: NORMAL
APTT: 23.8 SECONDS (ref 22–38)
AST SERPL-CCNC: 16 U/L (ref 5–40)
BASOPHILS # BLD: 0.9 %
BASOPHILS ABSOLUTE: 0.1 THOU/MM3 (ref 0–0.1)
BILIRUB SERPL-MCNC: 0.4 MG/DL (ref 0.3–1.2)
BUN BLDV-MCNC: 19 MG/DL (ref 7–22)
CALCIUM SERPL-MCNC: 8.5 MG/DL (ref 8.5–10.5)
CHLORIDE BLD-SCNC: 105 MEQ/L (ref 98–111)
CO2: 25 MEQ/L (ref 23–33)
CREAT SERPL-MCNC: 0.5 MG/DL (ref 0.4–1.2)
EKG ATRIAL RATE: 58 BPM
EKG P AXIS: 54 DEGREES
EKG P-R INTERVAL: 222 MS
EKG Q-T INTERVAL: 524 MS
EKG QRS DURATION: 138 MS
EKG QTC CALCULATION (BAZETT): 514 MS
EKG R AXIS: -22 DEGREES
EKG T AXIS: 107 DEGREES
EKG VENTRICULAR RATE: 58 BPM
EOSINOPHIL # BLD: 1.9 %
EOSINOPHILS ABSOLUTE: 0.2 THOU/MM3 (ref 0–0.4)
GFR SERPL CREATININE-BSD FRML MDRD: > 90 ML/MIN/1.73M2
GLUCOSE BLD-MCNC: 103 MG/DL (ref 70–108)
HCT VFR BLD CALC: 30.9 % (ref 37–47)
HCT VFR BLD CALC: 31.9 % (ref 37–47)
HEMOCCULT STL QL: NEGATIVE
HEMOGLOBIN: 10.4 GM/DL (ref 12–16)
HEMOGLOBIN: 10.8 GM/DL (ref 12–16)
HEMOGLOBIN: 8.4 GM/DL (ref 12–16)
HEMOGLOBIN: 9.6 GM/DL (ref 12–16)
INR BLD: 0.97 (ref 0.85–1.13)
LACTIC ACID: 0.9 MMOL/L (ref 0.5–2.2)
LYMPHOCYTES # BLD: 11 %
LYMPHOCYTES ABSOLUTE: 1 THOU/MM3 (ref 1–4.8)
MCH RBC QN AUTO: 30.5 PG (ref 27–31)
MCHC RBC AUTO-ENTMCNC: 33.9 GM/DL (ref 33–37)
MCV RBC AUTO: 90.1 FL (ref 81–99)
MONOCYTES # BLD: 6.2 %
MONOCYTES ABSOLUTE: 0.6 THOU/MM3 (ref 0.4–1.3)
NUCLEATED RED BLOOD CELLS: 0 /100 WBC
OSMOLALITY CALCULATION: 283.8 MOSMOL/KG (ref 275–300)
PDW BLD-RTO: 16.6 % (ref 11.5–14.5)
PLATELET # BLD: 156 THOU/MM3 (ref 130–400)
PMV BLD AUTO: 9 MCM (ref 7.4–10.4)
POTASSIUM SERPL-SCNC: 3.8 MEQ/L (ref 3.5–5.2)
RBC # BLD: 3.54 MILL/MM3 (ref 4.2–5.4)
RH FACTOR: NORMAL
SEG NEUTROPHILS: 80 %
SEGMENTED NEUTROPHILS ABSOLUTE COUNT: 7.5 THOU/MM3 (ref 1.8–7.7)
SODIUM BLD-SCNC: 141 MEQ/L (ref 135–145)
TOTAL PROTEIN: 6 G/DL (ref 6.1–8)
WBC # BLD: 9.4 THOU/MM3 (ref 4.8–10.8)

## 2018-01-18 PROCEDURE — 83605 ASSAY OF LACTIC ACID: CPT

## 2018-01-18 PROCEDURE — 6370000000 HC RX 637 (ALT 250 FOR IP): Performed by: INTERNAL MEDICINE

## 2018-01-18 PROCEDURE — 85018 HEMOGLOBIN: CPT

## 2018-01-18 PROCEDURE — 80053 COMPREHEN METABOLIC PANEL: CPT

## 2018-01-18 PROCEDURE — P9016 RBC LEUKOCYTES REDUCED: HCPCS

## 2018-01-18 PROCEDURE — 99284 EMERGENCY DEPT VISIT MOD MDM: CPT

## 2018-01-18 PROCEDURE — 2580000003 HC RX 258: Performed by: NURSE PRACTITIONER

## 2018-01-18 PROCEDURE — 85730 THROMBOPLASTIN TIME PARTIAL: CPT

## 2018-01-18 PROCEDURE — 86922 COMPATIBILITY TEST ANTIGLOB: CPT

## 2018-01-18 PROCEDURE — 86900 BLOOD TYPING SEROLOGIC ABO: CPT

## 2018-01-18 PROCEDURE — 86901 BLOOD TYPING SEROLOGIC RH(D): CPT

## 2018-01-18 PROCEDURE — 85025 COMPLETE CBC W/AUTO DIFF WBC: CPT

## 2018-01-18 PROCEDURE — 36430 TRANSFUSION BLD/BLD COMPNT: CPT

## 2018-01-18 PROCEDURE — 51702 INSERT TEMP BLADDER CATH: CPT

## 2018-01-18 PROCEDURE — 93005 ELECTROCARDIOGRAM TRACING: CPT

## 2018-01-18 PROCEDURE — 1200000003 HC TELEMETRY R&B

## 2018-01-18 PROCEDURE — 86850 RBC ANTIBODY SCREEN: CPT

## 2018-01-18 PROCEDURE — 82272 OCCULT BLD FECES 1-3 TESTS: CPT

## 2018-01-18 PROCEDURE — 85610 PROTHROMBIN TIME: CPT

## 2018-01-18 PROCEDURE — C9113 INJ PANTOPRAZOLE SODIUM, VIA: HCPCS | Performed by: NURSE PRACTITIONER

## 2018-01-18 PROCEDURE — 85014 HEMATOCRIT: CPT

## 2018-01-18 PROCEDURE — 36415 COLL VENOUS BLD VENIPUNCTURE: CPT

## 2018-01-18 PROCEDURE — 6360000002 HC RX W HCPCS: Performed by: NURSE PRACTITIONER

## 2018-01-18 PROCEDURE — 2580000003 HC RX 258: Performed by: INTERNAL MEDICINE

## 2018-01-18 PROCEDURE — 96374 THER/PROPH/DIAG INJ IV PUSH: CPT

## 2018-01-18 RX ORDER — 0.9 % SODIUM CHLORIDE 0.9 %
1000 INTRAVENOUS SOLUTION INTRAVENOUS ONCE
Status: COMPLETED | OUTPATIENT
Start: 2018-01-18 | End: 2018-01-18

## 2018-01-18 RX ORDER — LOSARTAN POTASSIUM 100 MG/1
100 TABLET ORAL DAILY
Status: DISCONTINUED | OUTPATIENT
Start: 2018-01-18 | End: 2018-01-22 | Stop reason: HOSPADM

## 2018-01-18 RX ORDER — 0.9 % SODIUM CHLORIDE 0.9 %
500 INTRAVENOUS SOLUTION INTRAVENOUS ONCE
Status: COMPLETED | OUTPATIENT
Start: 2018-01-18 | End: 2018-01-18

## 2018-01-18 RX ORDER — 0.9 % SODIUM CHLORIDE 0.9 %
250 INTRAVENOUS SOLUTION INTRAVENOUS ONCE
Status: COMPLETED | OUTPATIENT
Start: 2018-01-18 | End: 2018-01-19

## 2018-01-18 RX ORDER — LOSARTAN POTASSIUM AND HYDROCHLOROTHIAZIDE 12.5; 1 MG/1; MG/1
1 TABLET ORAL DAILY
Status: DISCONTINUED | OUTPATIENT
Start: 2018-01-18 | End: 2018-01-18 | Stop reason: SDUPTHER

## 2018-01-18 RX ORDER — ACETAMINOPHEN 325 MG/1
650 TABLET ORAL EVERY 4 HOURS PRN
Status: DISCONTINUED | OUTPATIENT
Start: 2018-01-18 | End: 2018-01-22 | Stop reason: HOSPADM

## 2018-01-18 RX ORDER — HYDROCHLOROTHIAZIDE 12.5 MG/1
12.5 CAPSULE, GELATIN COATED ORAL DAILY
Status: DISCONTINUED | OUTPATIENT
Start: 2018-01-18 | End: 2018-01-22 | Stop reason: HOSPADM

## 2018-01-18 RX ORDER — SODIUM CHLORIDE 9 MG/ML
INJECTION, SOLUTION INTRAVENOUS CONTINUOUS
Status: ACTIVE | OUTPATIENT
Start: 2018-01-18 | End: 2018-01-20

## 2018-01-18 RX ORDER — ONDANSETRON 2 MG/ML
4 INJECTION INTRAMUSCULAR; INTRAVENOUS EVERY 6 HOURS PRN
Status: DISCONTINUED | OUTPATIENT
Start: 2018-01-18 | End: 2018-01-22 | Stop reason: HOSPADM

## 2018-01-18 RX ORDER — DIPHENHYDRAMINE HCL 25 MG
25 TABLET ORAL NIGHTLY PRN
Status: DISCONTINUED | OUTPATIENT
Start: 2018-01-18 | End: 2018-01-18

## 2018-01-18 RX ORDER — 0.9 % SODIUM CHLORIDE 0.9 %
10 VIAL (ML) INJECTION DAILY
Status: DISCONTINUED | OUTPATIENT
Start: 2018-01-18 | End: 2018-01-22 | Stop reason: ALTCHOICE

## 2018-01-18 RX ORDER — POTASSIUM CHLORIDE 20 MEQ/1
20 TABLET, EXTENDED RELEASE ORAL DAILY
Status: DISCONTINUED | OUTPATIENT
Start: 2018-01-18 | End: 2018-01-22 | Stop reason: HOSPADM

## 2018-01-18 RX ORDER — SODIUM CHLORIDE 0.9 % (FLUSH) 0.9 %
10 SYRINGE (ML) INJECTION PRN
Status: DISCONTINUED | OUTPATIENT
Start: 2018-01-18 | End: 2018-01-22 | Stop reason: HOSPADM

## 2018-01-18 RX ORDER — DIPHENHYDRAMINE HCL 25 MG
25 TABLET ORAL NIGHTLY PRN
Status: DISCONTINUED | OUTPATIENT
Start: 2018-01-18 | End: 2018-01-22 | Stop reason: HOSPADM

## 2018-01-18 RX ORDER — PANTOPRAZOLE SODIUM 40 MG/10ML
40 INJECTION, POWDER, LYOPHILIZED, FOR SOLUTION INTRAVENOUS DAILY
Status: DISCONTINUED | OUTPATIENT
Start: 2018-01-18 | End: 2018-01-21

## 2018-01-18 RX ORDER — SODIUM CHLORIDE 0.9 % (FLUSH) 0.9 %
10 SYRINGE (ML) INJECTION EVERY 12 HOURS SCHEDULED
Status: DISCONTINUED | OUTPATIENT
Start: 2018-01-18 | End: 2018-01-22 | Stop reason: HOSPADM

## 2018-01-18 RX ORDER — BUMETANIDE 1 MG/1
1 TABLET ORAL DAILY
Status: DISCONTINUED | OUTPATIENT
Start: 2018-01-18 | End: 2018-01-22 | Stop reason: HOSPADM

## 2018-01-18 RX ADMIN — DIPHENHYDRAMINE HCL 25 MG: 25 TABLET ORAL at 21:00

## 2018-01-18 RX ADMIN — PANTOPRAZOLE SODIUM 40 MG: 40 INJECTION, POWDER, FOR SOLUTION INTRAVENOUS at 10:04

## 2018-01-18 RX ADMIN — SODIUM CHLORIDE 500 ML: 9 INJECTION, SOLUTION INTRAVENOUS at 08:20

## 2018-01-18 RX ADMIN — METOPROLOL TARTRATE 25 MG: 25 TABLET ORAL at 21:00

## 2018-01-18 RX ADMIN — SODIUM CHLORIDE 250 ML: 9 INJECTION, SOLUTION INTRAVENOUS at 23:40

## 2018-01-18 RX ADMIN — SODIUM CHLORIDE 1000 ML: 9 INJECTION, SOLUTION INTRAVENOUS at 10:05

## 2018-01-18 RX ADMIN — Medication 10 ML: at 10:05

## 2018-01-18 RX ADMIN — POTASSIUM CHLORIDE 20 MEQ: 1500 TABLET, EXTENDED RELEASE ORAL at 13:09

## 2018-01-18 RX ADMIN — LOSARTAN POTASSIUM 100 MG: 100 TABLET, FILM COATED ORAL at 13:09

## 2018-01-18 RX ADMIN — HYDROCHLOROTHIAZIDE 12.5 MG: 12.5 CAPSULE ORAL at 13:09

## 2018-01-18 RX ADMIN — SODIUM CHLORIDE: 900 INJECTION INTRAVENOUS at 11:46

## 2018-01-18 RX ADMIN — BUMETANIDE 1 MG: 1 TABLET ORAL at 13:09

## 2018-01-18 RX ADMIN — Medication 10 ML: at 13:09

## 2018-01-18 ASSESSMENT — ENCOUNTER SYMPTOMS
NAUSEA: 0
ABDOMINAL PAIN: 0
SORE THROAT: 0
SHORTNESS OF BREATH: 0
VOMITING: 0
SINUS PRESSURE: 0
BACK PAIN: 0
PHOTOPHOBIA: 0
RHINORRHEA: 0
CONSTIPATION: 0
ANAL BLEEDING: 1
ABDOMINAL DISTENTION: 0
EYE REDNESS: 0
COUGH: 0
COLOR CHANGE: 0
VOICE CHANGE: 0
WHEEZING: 0
CHEST TIGHTNESS: 0
DIARRHEA: 1
BLOOD IN STOOL: 0

## 2018-01-18 NOTE — ED PROVIDER NOTES
dizziness, tremors, weakness, light-headedness, numbness and headaches. Hematological: Does not bruise/bleed easily. Psychiatric/Behavioral: Negative for behavioral problems, confusion, decreased concentration, hallucinations, self-injury and suicidal ideas. The patient is not nervous/anxious. PAST MEDICAL HISTORY    has a past medical history of Acute gastritis; Arthritis; Bilateral carotid artery disease ([address] Breast CA ([facility] [address] Chronic kidney disease; Colon, diverticulosis; Hiatal hernia with gastroesophageal reflux; Hypertension; Insomnia; LBBB (left bundle branch block); Movement disorder; Osteoporosis; and Pneumonia. SURGICAL HISTORY      has a past surgical history that includes Mastectomy (right ; left ); Lumbar disc arthroplasty (; ); Total hip arthroplasty (right ; left ); Total knee arthroplasty (left and right ); Cataract removal (); Colonoscopy (); Upper gastrointestinal endoscopy (); laminectomy (2012); shoulder surgery (Left, 2012); Breast surgery; joint replacement; Endoscopy, colon, diagnostic; Tonsillectomy; eye surgery; back surgery (); and Humerus fracture surgery (Right, 2017). CURRENT MEDICATIONS       Previous Medications    ACETAMINOPHEN (TYLENOL) 325 MG TABLET    Take 2 tablets by mouth every 4 hours as needed for Pain or Fever    BUMETANIDE (BUMEX) 1 MG TABLET    Take 1 tablet by mouth daily    DIPHENHYDRAMINE (BENADRYL) 25 MG TABLET    Take 25 mg by mouth nightly as needed for Itching    LOSARTAN-HYDROCHLOROTHIAZIDE (HYZAAR) 100-12.5 MG PER TABLET    Take 1 tablet by mouth daily    METOPROLOL TARTRATE (LOPRESSOR) 25 MG TABLET    take 1 tablet by mouth twice a day (8AM AND 8PM)    POTASSIUM CHLORIDE (KLOR-CON M) 20 MEQ EXTENDED RELEASE TABLET    Take 1 tablet by mouth daily       ALLERGIES     has No Known Allergies. FAMILY HISTORY     indicated that her mother is .  She indicated that her father is

## 2018-01-18 NOTE — PROGRESS NOTES
Pt admitted to  6K26 from ED and via cart/stretcher. Complaints: None. IV normal saline infusing into the forearm right, condition patent and no redness at a rate of gravity with about 200 mls in the bag still. IV site free of s/s of infection or infiltration. Vital signs obtained. Assessment and data collection initiated. Oriented to room. Policies and procedures for  explained All questions answered with no further questions at this time. Fall prevention and safety brochure discussed with patient. The best day to schedule a follow up Dr appointment is:  Monday p.m.

## 2018-01-18 NOTE — ED NOTES
In to reassess pt and change linens. Pt cleaned with warm soap and water. Significantly less blood from pts rectum this time, although large clots were still present. Family at bedside. Respirations easy and unlabored. Warm blanket given for comfort. Call light in reach. Will continue to monitor.      Shree Olivas RN  01/18/18 [id number]

## 2018-01-18 NOTE — ED TRIAGE NOTES
Pt presents to the ED via EMS with c/o rectal bleeding. Pt reports she woke up around 0300 and was significantly bleeding from her rectum. EMS reports there was mass amounts of blood covering the bathroom. Pt reports she has never had this problem in the past. Upon arrival to the ED, pts lower extremities are covered in blood. Pt was turned to be cleaned, where RN noticed large clots, approximately the size of a palm of a hand coming from the pts rectum. Pt was cleaned multiple times, but continued to have clots come from rectum. Pankaj Ling NP at bedside to perform rectal exam and pt assessment. Pts respirations easy and unlabored. Pt has a Hx of breast CA and a bilateral mastectomy. Pt denies being on blood thinners. Call light in reach. Will monitor.

## 2018-01-18 NOTE — ED NOTES
Pt admitted to hospital. Transported to floor by cart in stable condition. Notified floor of transport.       [facility] List, LPN  [id number] [phone]

## 2018-01-18 NOTE — CONSULTS
Discuss patient with Dr. Everardo Kelley in ER patient known to the practice, will seen today
History of hiatal hernia with GERD - continue PPI  8. History of CKD    Thank You Dr. Isaías Espinoza MD for allowing me to participate in the care of this patient. Assessment and POC were discussed with Dr Darshana Antunez.     Jamila Mantilla, DIGNA  1/18/2018  5:25 PM     Patient is seen independently from the nurse practitioner and all  the pertinent data along with physical examination and assessment and plans are all obtained by my self and  Laboratory data, Radiology results, medications all are reviewed by my self and care is discussed extensively with the patient  and the patients nurse and all agree with plan and in addition see orders and plans    Electronically signed by Tavo Quintanilla MD on 1/18/2018 at 7:02 PM

## 2018-01-18 NOTE — H&P
incontinence,  flank pains , urgency , genital discharge  SKIN / Gareld Lesches:  No rashes, petechiae, , no open wounds , no soft tissue swelling   MUSCULOSKELETAL: No bone pains, no arthralgia, no joint swelling, no myalgia  HEMATOLOGIC: No easy bruising, no bleeding  OPHTHALMOLOGY: No conjuctival injection, no discharge, no pain, no blurring of    vision, no loss of vision, no diplopia  EAR: No loss of hearing , tinnitus, ear discharge , no ear pain          Vitals:   Vitals:    01/18/18 0959   BP: 130/77   Pulse: 57   Resp: 14   Temp:    SpO2: 95%      BMI: Body mass index is 23.68 kg/m². PHYSICAL EXAMINATION:            General appearance:  No apparent distress, appears stated age and cooperative. HEENT:  Normal cephalic, atraumatic without obvious deformity. Pupils equal, round, and reactive to light. Extra ocular muscles intact. Conjunctivae/corneas clear. Neck: Supple, with full range of motion. No jugular venous distention. Trachea midline. Respiratory:  Normal respiratory effort. Clear to auscultation, bilaterally without Rales/Wheezes/Rhonchi. Cardiovascular:  Regular rate and rhythm with normal S1/S2 without murmurs, rubs or gallops. Abdomen: Soft, non-tender, non-distended with normal bowel sounds. Musculoskeletal:  No clubbing, cyanosis or edema bilaterally. Full range of motion without deformity. Skin: Skin color, texture, turgor normal.  No rashes or lesions. Neurologic: Alert and oriented, Neurovascularly intact without any focal motor deficits.    Psychiatric:   Thought content appropriate, normal insight    Review of Labs and Diagnostic Testing:    Recent Results (from the past 24 hour(s))   Blood occult stool screen #1    Collection Time: 01/18/18  8:09 AM   Result Value Ref Range    OCCULT BLOOD FECAL Negative    APTT    Collection Time: 01/18/18  8:31 AM   Result Value Ref Range    aPTT 23.8 22.0 - 38.0 seconds   Protime-INR    Collection Time: 01/18/18  8:31 AM   Result Value Ref Range

## 2018-01-19 LAB
ALBUMIN SERPL-MCNC: 3 G/DL (ref 3.5–5.1)
ALP BLD-CCNC: 102 U/L (ref 38–126)
ALT SERPL-CCNC: 10 U/L (ref 11–66)
ANISOCYTOSIS: ABNORMAL
AST SERPL-CCNC: 14 U/L (ref 5–40)
BILIRUB SERPL-MCNC: 0.8 MG/DL (ref 0.3–1.2)
BUN BLDV-MCNC: 15 MG/DL (ref 7–22)
CALCIUM SERPL-MCNC: 8.2 MG/DL (ref 8.5–10.5)
CHLORIDE BLD-SCNC: 101 MEQ/L (ref 98–111)
CO2: 23 MEQ/L (ref 23–33)
CREAT SERPL-MCNC: 0.5 MG/DL (ref 0.4–1.2)
DIFFERENTIAL, MANUAL: NORMAL
GFR SERPL CREATININE-BSD FRML MDRD: > 90 ML/MIN/1.73M2
GLUCOSE BLD-MCNC: 105 MG/DL (ref 70–108)
HCT VFR BLD CALC: 28.1 % (ref 37–47)
HCT VFR BLD CALC: 28.2 % (ref 37–47)
HCT VFR BLD CALC: 28.5 % (ref 37–47)
HCT VFR BLD CALC: 28.5 % (ref 37–47)
HEMOGLOBIN: 9.2 GM/DL (ref 12–16)
HEMOGLOBIN: 9.2 GM/DL (ref 12–16)
HEMOGLOBIN: 9.3 GM/DL (ref 12–16)
HEMOGLOBIN: 9.7 GM/DL (ref 12–16)
LYMPHOCYTES # BLD: 30 %
LYMPHOCYTES ABSOLUTE: 2.8 THOU/MM3 (ref 1–4.8)
MAGNESIUM: 1.8 MG/DL (ref 1.6–2.4)
MCH RBC QN AUTO: 29.4 PG (ref 27–31)
MCHC RBC AUTO-ENTMCNC: 32.3 GM/DL (ref 33–37)
MCV RBC AUTO: 90.9 FL (ref 81–99)
MONOCYTES # BLD: 8 %
MONOCYTES ABSOLUTE: 0.8 THOU/MM3 (ref 0.4–1.3)
PDW BLD-RTO: 17.8 % (ref 11.5–14.5)
PLATELET # BLD: 147 THOU/MM3 (ref 130–400)
PLATELET ESTIMATE: ADEQUATE
PMV BLD AUTO: 9.1 MCM (ref 7.4–10.4)
POTASSIUM REFLEX MAGNESIUM: 3.4 MEQ/L (ref 3.5–5.2)
RBC # BLD: 3.14 MILL/MM3 (ref 4.2–5.4)
SEG NEUTROPHILS: 62 %
SEGMENTED NEUTROPHILS ABSOLUTE COUNT: 5.8 THOU/MM3 (ref 1.8–7.7)
SODIUM BLD-SCNC: 138 MEQ/L (ref 135–145)
TOTAL PROTEIN: 5.1 G/DL (ref 6.1–8)
WBC # BLD: 9.4 THOU/MM3 (ref 4.8–10.8)

## 2018-01-19 PROCEDURE — 6370000000 HC RX 637 (ALT 250 FOR IP): Performed by: INTERNAL MEDICINE

## 2018-01-19 PROCEDURE — 36415 COLL VENOUS BLD VENIPUNCTURE: CPT

## 2018-01-19 PROCEDURE — 1200000003 HC TELEMETRY R&B

## 2018-01-19 PROCEDURE — 80053 COMPREHEN METABOLIC PANEL: CPT

## 2018-01-19 PROCEDURE — 83735 ASSAY OF MAGNESIUM: CPT

## 2018-01-19 PROCEDURE — 85025 COMPLETE CBC W/AUTO DIFF WBC: CPT

## 2018-01-19 PROCEDURE — 6360000002 HC RX W HCPCS: Performed by: NURSE PRACTITIONER

## 2018-01-19 PROCEDURE — C9113 INJ PANTOPRAZOLE SODIUM, VIA: HCPCS | Performed by: NURSE PRACTITIONER

## 2018-01-19 PROCEDURE — 2580000003 HC RX 258: Performed by: INTERNAL MEDICINE

## 2018-01-19 PROCEDURE — 85014 HEMATOCRIT: CPT

## 2018-01-19 PROCEDURE — 85018 HEMOGLOBIN: CPT

## 2018-01-19 RX ORDER — POLYETHYLENE GLYCOL 3350 17 G/17G
238 POWDER, FOR SOLUTION ORAL ONCE
Status: COMPLETED | OUTPATIENT
Start: 2018-01-19 | End: 2018-01-19

## 2018-01-19 RX ORDER — SENNA PLUS 8.6 MG/1
15 TABLET ORAL ONCE
Status: COMPLETED | OUTPATIENT
Start: 2018-01-19 | End: 2018-01-19

## 2018-01-19 RX ADMIN — METOPROLOL TARTRATE 25 MG: 25 TABLET ORAL at 08:24

## 2018-01-19 RX ADMIN — POLYETHYLENE GLYCOL 3350 238 G: 17 POWDER, FOR SOLUTION ORAL at 19:56

## 2018-01-19 RX ADMIN — METOPROLOL TARTRATE 25 MG: 25 TABLET ORAL at 19:56

## 2018-01-19 RX ADMIN — DIPHENHYDRAMINE HCL 25 MG: 25 TABLET ORAL at 20:03

## 2018-01-19 RX ADMIN — SODIUM CHLORIDE: 900 INJECTION INTRAVENOUS at 12:26

## 2018-01-19 RX ADMIN — BUMETANIDE 1 MG: 1 TABLET ORAL at 08:25

## 2018-01-19 RX ADMIN — Medication 10 ML: at 20:00

## 2018-01-19 RX ADMIN — HYDROCHLOROTHIAZIDE 12.5 MG: 12.5 CAPSULE ORAL at 08:25

## 2018-01-19 RX ADMIN — SENNA 129 MG: 8.6 TABLET, COATED ORAL at 18:19

## 2018-01-19 RX ADMIN — PANTOPRAZOLE SODIUM 40 MG: 40 INJECTION, POWDER, FOR SOLUTION INTRAVENOUS at 08:25

## 2018-01-19 RX ADMIN — POTASSIUM CHLORIDE 20 MEQ: 1500 TABLET, EXTENDED RELEASE ORAL at 08:25

## 2018-01-19 RX ADMIN — LOSARTAN POTASSIUM 100 MG: 100 TABLET, FILM COATED ORAL at 08:25

## 2018-01-19 NOTE — PROGRESS NOTES
Pharmacy Medication History Note      List of current medications patient is taking is complete. Source of information: [facility] Christopher, patient    Changes made to medication list:  Medications removed (include reason, ex. therapy complete or physician discontinued): · Tylenol - patient not taking  · Benadryl - patient not taking for itching, but did receive a dose last night and reports that it helped her sleep. Typically has difficulty sleeping at home. Patient stated she would like to continue receiving benadryl for sleep. Medications added/doses adjusted:  · none    Other notes (ex. Recent course of antibiotics, Coumadin dosing):  · Denies use of other OTC or herbal medications.       Allergies reviewed      Electronically signed by Santa Traylor on 1/19/2018 at 10:51 AM

## 2018-01-19 NOTE — PROGRESS NOTES
Platelets 638 111 - 765 thou/mm3    MPV 9.1 7.4 - 10.4 mcm    Seg Neutrophils 62.0 %    Lymphocytes 30.0 %    Monocytes 8.0 %    Platelet Estimate ADEQUATE Adequate    Anisocytosis 1+ Absent    Segs Absolute 5.8 1.8 - 7.7 thou/mm3    Lymphocytes # 2.8 1.0 - 4.8 thou/mm3    Monocytes # 0.8 0.4 - 1.3 thou/mm3   Hemoglobin and hematocrit, blood    Collection Time: 01/19/18  4:25 AM   Result Value Ref Range    Hemoglobin 9.2 (L) 12.0 - 16.0 gm/dl    Hematocrit 28.5 (L) 37.0 - 47.0 %   Glomerular Filtration Rate, Estimated    Collection Time: 01/19/18  4:25 AM   Result Value Ref Range    Est, Glom Filt Rate >90 ml/min/1.73m2   Magnesium    Collection Time: 01/19/18  4:25 AM   Result Value Ref Range    Magnesium 1.8 1.6 - 2.4 mg/dL   Manual Differential    Collection Time: 01/19/18  4:25 AM   Result Value Ref Range    Differential, manual see below        Radiology:     No results found. ASSESMENT:      Active Problems:    Lower GI bleed      PLAN: Bleed has been painless, Suspect she has bleeding diverticuli.  Monitor hemoglobin,  transfuse PRBC as needed, continue Proton pump inhibitors, GI  Service plan Colonoscopy tomorrow,  clear liquids, bed rest.  SCD/DELPHINE hose for DVT prophylaxis     Dr Umu Haskins covers from 1/19  to   1 / 22 / 18            DVT prophylaxis: [] Lovenox                                 [x] SCDs +DELPHINE hose                                  [] SQ Heparin                                 [] Encourage ambulation, low risk for DVT, no chemical or mechanical prophylaxis necessary              [] Already on Anticoagulation                Anticipated Disposition upon discharge: [x] Home                                                                         [] Home with Home Health                                                                         [] [facility]                                                                         [] [address] [place] [address]
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
## 2018-01-19 NOTE — FLOWSHEET NOTE
01/18/18 1952   Provider Notification   Reason for Communication Review case  (Hgb 8.4)   Provider Name Dr. Ricky Lombard   Provider Notification Physician   Method of Communication Secure Message   Response Waiting for response   Notification Time 1951     Dr. Ricky Lombard returned page, orders to transfuse 1 unit of RBC.

## 2018-01-20 LAB
HCT VFR BLD CALC: 28.9 % (ref 37–47)
HEMOGLOBIN: 9.3 GM/DL (ref 12–16)

## 2018-01-20 PROCEDURE — 3609027000 HC COLONOSCOPY: Performed by: INTERNAL MEDICINE

## 2018-01-20 PROCEDURE — 99152 MOD SED SAME PHYS/QHP 5/>YRS: CPT | Performed by: INTERNAL MEDICINE

## 2018-01-20 PROCEDURE — 2580000003 HC RX 258: Performed by: INTERNAL MEDICINE

## 2018-01-20 PROCEDURE — 6370000000 HC RX 637 (ALT 250 FOR IP): Performed by: INTERNAL MEDICINE

## 2018-01-20 PROCEDURE — 36415 COLL VENOUS BLD VENIPUNCTURE: CPT

## 2018-01-20 PROCEDURE — 85018 HEMOGLOBIN: CPT

## 2018-01-20 PROCEDURE — C9113 INJ PANTOPRAZOLE SODIUM, VIA: HCPCS | Performed by: NURSE PRACTITIONER

## 2018-01-20 PROCEDURE — 6360000002 HC RX W HCPCS: Performed by: NURSE PRACTITIONER

## 2018-01-20 PROCEDURE — 0DJD8ZZ INSPECTION OF LOWER INTESTINAL TRACT, VIA NATURAL OR ARTIFICIAL OPENING ENDOSCOPIC: ICD-10-PCS | Performed by: INTERNAL MEDICINE

## 2018-01-20 PROCEDURE — 99153 MOD SED SAME PHYS/QHP EA: CPT | Performed by: INTERNAL MEDICINE

## 2018-01-20 PROCEDURE — 1200000003 HC TELEMETRY R&B

## 2018-01-20 PROCEDURE — 85014 HEMATOCRIT: CPT

## 2018-01-20 PROCEDURE — 6360000002 HC RX W HCPCS: Performed by: INTERNAL MEDICINE

## 2018-01-20 PROCEDURE — 2580000003 HC RX 258: Performed by: NURSE PRACTITIONER

## 2018-01-20 RX ORDER — MIDAZOLAM HYDROCHLORIDE 1 MG/ML
INJECTION INTRAMUSCULAR; INTRAVENOUS PRN
Status: DISCONTINUED | OUTPATIENT
Start: 2018-01-20 | End: 2018-01-20 | Stop reason: HOSPADM

## 2018-01-20 RX ORDER — FENTANYL CITRATE 50 UG/ML
INJECTION, SOLUTION INTRAMUSCULAR; INTRAVENOUS PRN
Status: DISCONTINUED | OUTPATIENT
Start: 2018-01-20 | End: 2018-01-20 | Stop reason: HOSPADM

## 2018-01-20 RX ORDER — HYDRALAZINE HYDROCHLORIDE 10 MG/1
10 TABLET, FILM COATED ORAL 3 TIMES DAILY
Status: DISCONTINUED | OUTPATIENT
Start: 2018-01-20 | End: 2018-01-22 | Stop reason: HOSPADM

## 2018-01-20 RX ADMIN — Medication 10 ML: at 07:35

## 2018-01-20 RX ADMIN — PANTOPRAZOLE SODIUM 40 MG: 40 INJECTION, POWDER, FOR SOLUTION INTRAVENOUS at 07:35

## 2018-01-20 RX ADMIN — HYDRALAZINE HYDROCHLORIDE 10 MG: 10 TABLET, FILM COATED ORAL at 21:20

## 2018-01-20 RX ADMIN — METOPROLOL TARTRATE 25 MG: 25 TABLET ORAL at 21:20

## 2018-01-20 RX ADMIN — HYDROCHLOROTHIAZIDE 12.5 MG: 12.5 CAPSULE ORAL at 07:35

## 2018-01-20 RX ADMIN — BUMETANIDE 1 MG: 1 TABLET ORAL at 07:35

## 2018-01-20 RX ADMIN — DIPHENHYDRAMINE HCL 25 MG: 25 TABLET ORAL at 21:20

## 2018-01-20 RX ADMIN — METOPROLOL TARTRATE 25 MG: 25 TABLET ORAL at 07:35

## 2018-01-20 RX ADMIN — POTASSIUM CHLORIDE 20 MEQ: 1500 TABLET, EXTENDED RELEASE ORAL at 07:35

## 2018-01-20 RX ADMIN — LOSARTAN POTASSIUM 100 MG: 100 TABLET, FILM COATED ORAL at 07:35

## 2018-01-20 ASSESSMENT — PAIN - FUNCTIONAL ASSESSMENT: PAIN_FUNCTIONAL_ASSESSMENT: 0-10

## 2018-01-20 ASSESSMENT — PAIN SCALES - GENERAL
PAINLEVEL_OUTOF10: 0
PAINLEVEL_OUTOF10: 0

## 2018-01-20 NOTE — BRIEF OP NOTE
Brief Postoperative Note    Zahra Jacobson  YOB: 1931  [id number]    Pre-operative Diagnosis: GI bleeding    Post-operative Diagnosis: Severe diverticulosis , internal hemorrhoids not bleeding and poor prep     Procedure: Colonoscopy     Anesthesia: Moderate Sedation    Surgeons/Assistants: Nely    Estimated Blood Loss: none    Complications: None    Specimens: Was Not Obtained    Findings: severe diverticulosis and poor prep     Electronically signed by Reed Silverio MD on 1/20/2018 at 9:34 AM

## 2018-01-20 NOTE — PLAN OF CARE
Problem: Falls - Risk of  Goal: Absence of falls  Outcome: Ongoing  Falling star program in place, magnet on door, slip resistant socks on when patient is out of bed, bed and chair alarms are on. Patient is using call light appropriately. Will continue hourly rounding and reassessing risk score. Problem: Bleeding:  Goal: Will show no signs and symptoms of excessive bleeding  Will show no signs and symptoms of excessive bleeding   Outcome: Ongoing  Patient colonoscopy completed today showed no active bleeding but severe diverticulosis. Discussed dietary restrictions with patient and family. Lab Results   Component Value Date    WBC 9.4 01/19/2018    HGB 9.3 (L) 01/20/2018    HCT 28.9 (L) 01/20/2018    MCV 90.9 01/19/2018     01/19/2018     Vitals:    01/20/18 1010   BP: (!) 181/76   Pulse: 57   Resp: 18   Temp: 97.8 °F (36.6 °C)   SpO2: 96%     \    Problem: Urinary Elimination:  Goal: Signs and symptoms of infection will decrease  Signs and symptoms of infection will decrease   Outcome: Completed Date Met: 01/20/18  Patient has no signs of any active infection. Problem: Discharge Planning:  Goal: Discharged to appropriate level of care  Discharged to appropriate level of care   Outcome: Ongoing  Social work and home health evaluations started today as patient lives home alone and plans to be discharged home when medically stable. Problem: Skin Integrity:  Goal: Will show no infection signs and symptoms  Will show no infection signs and symptoms   Outcome: Completed Date Met: 01/20/18  No new skin breakdown noted. Encouraged patient to reposition in bed. Mucous membranes are moist.      Comments: Care plan reviewed with patient and family. Patient and family verbalize understanding of the plan of care and contribute to goal setting.

## 2018-01-20 NOTE — PRE SEDATION
Daily, Isabelle Pacheco MD, 1 mg at 01/20/18 0735    acetaminophen (TYLENOL) tablet 650 mg, 650 mg, Oral, Q4H PRN, Isabelle Pacheco MD    metoprolol tartrate (LOPRESSOR) tablet 25 mg, 25 mg, Oral, BID, Isabelle Pacheco MD, 25 mg at 01/20/18 0735    potassium chloride (KLOR-CON M) extended release tablet 20 mEq, 20 mEq, Oral, Daily, Isabelle Pacheco MD, 20 mEq at 01/20/18 0735    sodium chloride flush 0.9 % injection 10 mL, 10 mL, Intravenous, 2 times per day, Isabelle Pacheco MD, 10 mL at 01/20/18 0735    sodium chloride flush 0.9 % injection 10 mL, 10 mL, Intravenous, PRN, Isabelle Pacheco MD    ondansetron (ZOFRAN) injection 4 mg, 4 mg, Intravenous, Q6H PRN, Isabelle Pacheco MD    0.9 % sodium chloride infusion, , Intravenous, Continuous, Isabelle Pacheco MD, Last Rate: 50 mL/hr at 01/19/18 1226    losartan (COZAAR) tablet 100 mg, 100 mg, Oral, Daily, 100 mg at 01/20/18 0735 **AND** hydrochlorothiazide (MICROZIDE) capsule 12.5 mg, 12.5 mg, Oral, Daily, Isabelle Pacheco MD, 12.5 mg at 01/20/18 0735    diphenhydrAMINE (BENADRYL) tablet 25 mg, 25 mg, Oral, Nightly PRN, Isabelle Pacheco MD, 25 mg at 01/19/18 2003  Prior to Admission medications    Medication Sig Start Date End Date Taking?  Authorizing Provider   metoprolol tartrate (LOPRESSOR) 25 MG tablet take 1 tablet by mouth twice a day (8AM AND 8PM) 12/11/17  Yes Sotero Helm MD   losartan-hydrochlorothiazide [facility]) 100-12.5 MG per tablet Take 1 tablet by mouth daily 12/11/17  Yes Sotero Helm MD   [facility]) 1 MG tablet Take 1 tablet by mouth daily 12/11/17  Yes Sotero Helm MD   potassium chloride (KLOR-CON M) 20 MEQ extended release tablet Take 1 tablet by mouth daily 7/24/17  Yes Sudha Schneider MD     Additional information:       PHYSICAL:   Heart:  [x]Regular rate and rhythm  []Other:    Lungs:  [x]Clear    []Other:    Abdomen: [x]Soft    []Other:    Mental Status: [x]Alert

## 2018-01-20 NOTE — PROGRESS NOTES
hours.  BNP: No results for input(s): BNP in the last 72 hours. Lipids: No results for input(s): CHOL, HDL in the last 72 hours.     Invalid input(s): LDLCALCU  INR:   Recent Labs      01/18/18   0831   INR  0.97       Radiology    Objective:   Vitals: BP (!) 181/76   Pulse 57   Temp 97.8 °F (36.6 °C) (Oral)   Resp 18   Ht 5' 10\" (1.778 m)   Wt 169 lb 3.2 oz (76.7 kg)   SpO2 96%   BMI 24.28 kg/m²   HEENT: Head:pupils react  Neck: supple  Lungs: clear to auscultation  Heart: regular rate and rhythm   Abdomen: soft BS heard NG NT  Extremities: warm  No edema  Neurologic:  Alert, oriented X3    Impression:   :   Lower GI bleed s/p colonoscopy Severe diverticulosis , internal hemorrhoids not bleeding and poor prep   HTN elevated BP  Breast Cancer s/p mastectomy  Deconditioning  Anemia sec to acute blood loss        Plan:    Advance diet  PT OT  Add hydralazine    Anahi Paz MD

## 2018-01-20 NOTE — PROGRESS NOTES
Awake and talking. Denies pain or discomfort. Dr Lesli Silver in to speak with pt and family regarding findings and plan of care.

## 2018-01-21 LAB
ANISOCYTOSIS: ABNORMAL
BASOPHILS # BLD: 0.7 %
BASOPHILS ABSOLUTE: 0.1 THOU/MM3 (ref 0–0.1)
EOSINOPHIL # BLD: 4.2 %
EOSINOPHILS ABSOLUTE: 0.4 THOU/MM3 (ref 0–0.4)
HCT VFR BLD CALC: 27.6 % (ref 37–47)
HEMOGLOBIN: 9.4 GM/DL (ref 12–16)
LYMPHOCYTES # BLD: 23.2 %
LYMPHOCYTES ABSOLUTE: 2 THOU/MM3 (ref 1–4.8)
MCH RBC QN AUTO: 30.8 PG (ref 27–31)
MCHC RBC AUTO-ENTMCNC: 34.1 GM/DL (ref 33–37)
MCV RBC AUTO: 90.3 FL (ref 81–99)
MONOCYTES # BLD: 13.1 %
MONOCYTES ABSOLUTE: 1.2 THOU/MM3 (ref 0.4–1.3)
NUCLEATED RED BLOOD CELLS: 0 /100 WBC
PDW BLD-RTO: 16.7 % (ref 11.5–14.5)
PLATELET # BLD: 162 THOU/MM3 (ref 130–400)
PMV BLD AUTO: 8.6 MCM (ref 7.4–10.4)
POTASSIUM SERPL-SCNC: 4.4 MEQ/L (ref 3.5–5.2)
RBC # BLD: 3.06 MILL/MM3 (ref 4.2–5.4)
SEG NEUTROPHILS: 58.8 %
SEGMENTED NEUTROPHILS ABSOLUTE COUNT: 5.2 THOU/MM3 (ref 1.8–7.7)
WBC # BLD: 8.8 THOU/MM3 (ref 4.8–10.8)

## 2018-01-21 PROCEDURE — C9113 INJ PANTOPRAZOLE SODIUM, VIA: HCPCS | Performed by: NURSE PRACTITIONER

## 2018-01-21 PROCEDURE — 2580000003 HC RX 258: Performed by: NURSE PRACTITIONER

## 2018-01-21 PROCEDURE — 36415 COLL VENOUS BLD VENIPUNCTURE: CPT

## 2018-01-21 PROCEDURE — 2580000003 HC RX 258: Performed by: INTERNAL MEDICINE

## 2018-01-21 PROCEDURE — 85025 COMPLETE CBC W/AUTO DIFF WBC: CPT

## 2018-01-21 PROCEDURE — 6370000000 HC RX 637 (ALT 250 FOR IP): Performed by: INTERNAL MEDICINE

## 2018-01-21 PROCEDURE — 84132 ASSAY OF SERUM POTASSIUM: CPT

## 2018-01-21 PROCEDURE — 1200000003 HC TELEMETRY R&B

## 2018-01-21 PROCEDURE — 6360000002 HC RX W HCPCS: Performed by: NURSE PRACTITIONER

## 2018-01-21 RX ORDER — PANTOPRAZOLE SODIUM 40 MG/1
40 TABLET, DELAYED RELEASE ORAL
Status: DISCONTINUED | OUTPATIENT
Start: 2018-01-22 | End: 2018-01-22 | Stop reason: HOSPADM

## 2018-01-21 RX ADMIN — POTASSIUM CHLORIDE 20 MEQ: 1500 TABLET, EXTENDED RELEASE ORAL at 08:14

## 2018-01-21 RX ADMIN — Medication 10 ML: at 08:13

## 2018-01-21 RX ADMIN — PANTOPRAZOLE SODIUM 40 MG: 40 INJECTION, POWDER, FOR SOLUTION INTRAVENOUS at 08:10

## 2018-01-21 RX ADMIN — HYDRALAZINE HYDROCHLORIDE 10 MG: 10 TABLET, FILM COATED ORAL at 21:31

## 2018-01-21 RX ADMIN — METOPROLOL TARTRATE 25 MG: 25 TABLET ORAL at 08:14

## 2018-01-21 RX ADMIN — LOSARTAN POTASSIUM 100 MG: 100 TABLET, FILM COATED ORAL at 08:14

## 2018-01-21 RX ADMIN — HYDRALAZINE HYDROCHLORIDE 10 MG: 10 TABLET, FILM COATED ORAL at 08:14

## 2018-01-21 RX ADMIN — HYDRALAZINE HYDROCHLORIDE 10 MG: 10 TABLET, FILM COATED ORAL at 14:03

## 2018-01-21 RX ADMIN — DIPHENHYDRAMINE HCL 25 MG: 25 TABLET ORAL at 21:31

## 2018-01-21 RX ADMIN — Medication 10 ML: at 21:32

## 2018-01-21 RX ADMIN — HYDROCHLOROTHIAZIDE 12.5 MG: 12.5 CAPSULE ORAL at 08:14

## 2018-01-21 RX ADMIN — BUMETANIDE 1 MG: 1 TABLET ORAL at 08:14

## 2018-01-21 RX ADMIN — Medication 10 ML: at 08:14

## 2018-01-21 NOTE — PLAN OF CARE
Problem: Falls - Risk of  Goal: Absence of falls  Outcome: Ongoing  Call light within reach. Side rails up x2. Bed alarm on. Non skid slippers available. Problem: Bleeding:  Goal: Will show no signs and symptoms of excessive bleeding  Will show no signs and symptoms of excessive bleeding   Outcome: Ongoing  No signs of bleeding    Problem: Discharge Planning:  Goal: Discharged to appropriate level of care  Discharged to appropriate level of care   Outcome: Ongoing  Patient plans to be discharged to home alone when medically stable. Problem: Skin Integrity:  Goal: Absence of new skin breakdown  Absence of new skin breakdown   Outcome: Ongoing  Patient has pressure ulcer on coccyx. Encouraging patient to turn every 2 hours and prn. EPC cream applied to coccyx. Patient on SPR mattress and waffle cushion to chair. Will continue to monitor. Comments: Care plan reviewed with patient. Patient verbalize understanding of the plan of care and contribute to goal setting.

## 2018-01-21 NOTE — OP NOTE
[address] [place] [address]                                 OPERATIVE REPORT    PATIENT NAME: Julia Trinh                    :        10/03/1931  MED REC NO:   [id number]                           ROOM:       9328  ACCOUNT NO:   [de-identified]                           ADMIT DATE: 2018  PROVIDER:     Wilmon Skiff, M.D. Alyssa Risen OF PROCEDURE:  2018    INDICATION:  The patient with history of GI bleed. She is an 72-year-old  female. ASA CLASSIFICATION:  III. SURGEON:  Wilmon Skiff, M.D. Ulysses Chente:  The patient was brought to GI lab. Consent was  obtained. The risks involved with the procedure explained to the patient. Informed consent was obtained. The patient was monitored during the  procedure with pulse oximetry, blood pressure monitoring, and oxygen by  nasal cannula. Sedation done by incremental doses of IV Versed, total 3 mg  of Versed and 50 mcg of fentanyl given in incremental doses during the  procedure to achieve continuous conscious sedation. PROCEDURE PERFORMED:  Colonoscopy. Digital rectal examination revealed  normal rectum. Standard colonoscope was advanced under direct vision from  the rectum up to the cecum. The prep was very good. A lot of washing  done. Despite that, exam was suboptimal.  A lot of stool was seen, which  was diverticulosis. After cecum intubation, scope was withdrawn very  slowly with a lot of washing done. Severe diverticulosis seen, more on the  left side, less on the ascending. However, also very severe on the left side. Scope was withdrawn. No polyps. No masses. No evidence of GI bleed seen. Scope withdrawn. On withdrawal of the scope, retroflex examination of the  rectum revealed small internal hemorrhoids, not really bleeding, not  actively bleeding. No blood seen during the evaluation.   Scope withdrawn  with no immediate

## 2018-01-22 VITALS
BODY MASS INDEX: 23.65 KG/M2 | WEIGHT: 165.2 LBS | HEART RATE: 61 BPM | HEIGHT: 70 IN | SYSTOLIC BLOOD PRESSURE: 118 MMHG | OXYGEN SATURATION: 97 % | RESPIRATION RATE: 16 BRPM | DIASTOLIC BLOOD PRESSURE: 55 MMHG | TEMPERATURE: 97.5 F

## 2018-01-22 PROBLEM — K64.8 INTERNAL HEMORRHOIDS: Status: ACTIVE | Noted: 2018-01-22

## 2018-01-22 PROBLEM — K57.90 DIVERTICULOSIS: Status: ACTIVE | Noted: 2018-01-22

## 2018-01-22 PROCEDURE — 97166 OT EVAL MOD COMPLEX 45 MIN: CPT

## 2018-01-22 PROCEDURE — 6370000000 HC RX 637 (ALT 250 FOR IP): Performed by: INTERNAL MEDICINE

## 2018-01-22 PROCEDURE — 97110 THERAPEUTIC EXERCISES: CPT

## 2018-01-22 PROCEDURE — G8978 MOBILITY CURRENT STATUS: HCPCS

## 2018-01-22 PROCEDURE — G8987 SELF CARE CURRENT STATUS: HCPCS

## 2018-01-22 PROCEDURE — 2580000003 HC RX 258: Performed by: INTERNAL MEDICINE

## 2018-01-22 PROCEDURE — 97161 PT EVAL LOW COMPLEX 20 MIN: CPT

## 2018-01-22 PROCEDURE — 6360000002 HC RX W HCPCS: Performed by: INTERNAL MEDICINE

## 2018-01-22 PROCEDURE — G8988 SELF CARE GOAL STATUS: HCPCS

## 2018-01-22 PROCEDURE — G0008 ADMIN INFLUENZA VIRUS VAC: HCPCS | Performed by: INTERNAL MEDICINE

## 2018-01-22 PROCEDURE — 2580000003 HC RX 258: Performed by: NURSE PRACTITIONER

## 2018-01-22 PROCEDURE — 97535 SELF CARE MNGMENT TRAINING: CPT

## 2018-01-22 PROCEDURE — G8979 MOBILITY GOAL STATUS: HCPCS

## 2018-01-22 PROCEDURE — 90686 IIV4 VACC NO PRSV 0.5 ML IM: CPT | Performed by: INTERNAL MEDICINE

## 2018-01-22 RX ORDER — HYDRALAZINE HYDROCHLORIDE 10 MG/1
10 TABLET, FILM COATED ORAL 3 TIMES DAILY
Qty: 90 TABLET | Refills: 3 | Status: SHIPPED | OUTPATIENT
Start: 2018-01-22 | End: 2018-05-29 | Stop reason: SDUPTHER

## 2018-01-22 RX ADMIN — POTASSIUM CHLORIDE 20 MEQ: 1500 TABLET, EXTENDED RELEASE ORAL at 09:25

## 2018-01-22 RX ADMIN — Medication 10 ML: at 09:24

## 2018-01-22 RX ADMIN — HYDROCHLOROTHIAZIDE 12.5 MG: 12.5 CAPSULE ORAL at 09:25

## 2018-01-22 RX ADMIN — METOPROLOL TARTRATE 25 MG: 25 TABLET ORAL at 09:25

## 2018-01-22 RX ADMIN — LOSARTAN POTASSIUM 100 MG: 100 TABLET, FILM COATED ORAL at 09:25

## 2018-01-22 RX ADMIN — HYDRALAZINE HYDROCHLORIDE 10 MG: 10 TABLET, FILM COATED ORAL at 09:24

## 2018-01-22 RX ADMIN — HYDRALAZINE HYDROCHLORIDE 10 MG: 10 TABLET, FILM COATED ORAL at 13:44

## 2018-01-22 RX ADMIN — PANTOPRAZOLE SODIUM 40 MG: 40 TABLET, DELAYED RELEASE ORAL at 04:18

## 2018-01-22 RX ADMIN — INFLUENZA A VIRUS A/SINGAPORE/GP1908/2015 IVR-180A (H1N1) ANTIGEN (PROPIOLACTONE INACTIVATED), INFLUENZA A VIRUS A/HONG KONG/4801/2014 X-263B (H3N2) ANTIGEN (PROPIOLACTONE INACTIVATED), INFLUENZA B VIRUS B/BRISBANE/46/2015 ANTIGEN (PROPIOLACTONE INACTIVATED), AND INFLUENZA B VIRUS B/PHUKET/3073/2013 BVR-1B ANTIGEN (PROPIOLACTONE INACTIVATED) 0.5 ML: 15; 15; 15; 15 INJECTION, SUSPENSION INTRAMUSCULAR at 17:45

## 2018-01-22 RX ADMIN — BUMETANIDE 1 MG: 1 TABLET ORAL at 09:25

## 2018-01-22 NOTE — DISCHARGE SUMMARY
tablet  Take 1 tablet by mouth daily             hydrALAZINE (APRESOLINE) 10 MG tablet  Take 1 tablet by mouth 3 times daily             losartan-hydrochlorothiazide (HYZAAR) 100-12.5 MG per tablet  Take 1 tablet by mouth daily             metoprolol tartrate (LOPRESSOR) 25 MG tablet  take 1 tablet by mouth twice a day (8AM AND 8PM)             potassium chloride (KLOR-CON M) 20 MEQ extended release tablet  Take 1 tablet by mouth daily                 Time Spent on discharge is more than 30 minutes in the examination, evaluation, counseling and review of medications and discharge plan. Signed: Thank you Mitzy Amos MD for the opportunity to be involved in this patient's care.     Electronically signed by Bala Montana MD on 1/22/2018 at 2:22 PM

## 2018-01-22 NOTE — PROGRESS NOTES
No jugular venous distention. Trachea midline. Respiratory:  Normal respiratory effort. Clear to auscultation, bilaterally without Rales/Wheezes/Rhonchi. Cardiovascular:  Regular rate and rhythm with normal S1/S2 without murmurs, rubs or gallops. Abdomen: Soft, non-tender, non-distended with normal bowel sounds. Musculoskeletal:  No clubbing, cyanosis or edema bilaterally. Full range of motion without deformity. Skin: Skin color, texture, turgor normal.  No rashes or lesions. Neurologic: Alert and oriented, Neurovascularly intact without any focal motor deficits. Psychiatric:   Thought content appropriate, normal insight     Review of Labs and Diagnostic Testing:    No results found for this or any previous visit (from the past 24 hour(s)). Radiology:     No results found. COLONOSCOPY:IMPRESSION:  1. Poor prep. 2.  Severe diverticulosis. 3.  Internal hemorrhoids.     PLAN:  1. Resume diet. 2.  If the patient tolerates diet, can be discharged and follow up with the  GI clinic as needed. 3.  Keep the bowels soft.   4.  Follow diverticulosis diet.        ASSESMENT:      Active Problems:    Lower GI bleed    Diverticulosis    Internal hemorrhoids      PLAN: Hemoglobin has been stable, d/c planning         DVT prophylaxis: [] Lovenox                                 [x] SCDs +DELPHINE hose                                  [] SQ Heparin                                 [] Encourage ambulation, low risk for DVT, no chemical or mechanical prophylaxis necessary              [] Already on Anticoagulation                Anticipated Disposition upon discharge: [x] Home                                                                         [] Home with Home Health                                                                         [] Leon[facility] Moore                                                                         [] [address]          Electronically signed by Consuelo Segura Dianna Parish MD on 1/22/2018 at 2:26 PM

## 2018-01-22 NOTE — FLOWSHEET NOTE
Patient stated her son is the one who takes care of her. She was waiting for him to come and take her home. 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 She was sitting in her chair working on a word puzzle. Pt welcomed prayer. Follow up may not be needed as she may be going home soon. She talked about her family and then we held hands and had prayer. 01/22/18 1524   Encounter Summary   Services provided to: Patient   Referral/Consult From: John Akhtar [address] 63 Yes   Continue Visiting No  (1/22 may be discharged)   Complexity of Encounter Low   Length of Encounter 15 minutes   Spiritual/Uatsdin   Type Spiritual support   Assessment Approachable   Intervention Prayer;Nurtured hope   Outcome Coping;Encouraged   [facility], prayer or Prayer for peace was given. It has our information of service, hours and phone number on it.

## 2018-01-22 NOTE — PLAN OF CARE
Problem: DISCHARGE BARRIERS  Goal: Patient's continuum of care needs are met  Outcome: Ongoing  Home with [facility]. See SW progress notes.

## 2018-01-22 NOTE — PROGRESS NOTES
and push off, steady without LOB. Distance: 60 feet    Exercises:  Comments: Pt performs seated B LE AROM: ankle pumps, marches, hip abd/add and LAQ x 10 reps to increase strength for improved gait and balance. Activity Tolerance:  Activity Tolerance: Patient Tolerated treatment well;Patient limited by endurance    Treatment Initiated: See above exercises. Assessment: Body structures, Functions, Activity limitations: Decreased functional mobility , Decreased balance, Decreased endurance, Decreased strength  Assessment: Pt admitted due to GI bleed. Pt tolerates session fair, limited by weakness. Pt demonstrates decreased strength, transfers, balance, activity tolerance and gait indicating the need for skilled PT services. Prognosis: Good    Clinical Presentation: Low - Stable and Uncomplicated: Pt admitted due to GI bleed. Pt tolerates session fair, limited by weakness. Pt demonstrates decreased strength, transfers, balance, activity tolerance and gait indicating the need for skilled PT services. Decision Making: High Complexitybased on patient assessment and decision making process of determining plan of care and establishing reasonable expectations for measurable functional outcomes    REQUIRES PT FOLLOW UP: Yes  Discharge Recommendations: Home with assist PRN, Home with Home health PT    Patient Education:  Patient Education: POC    Equipment Recommendations:  Equipment Needed: No    Safety:  Type of devices:  All fall risk precautions in place, Call light within reach, Chair alarm in place, Patient at risk for falls, Gait belt, Left in chair  Restraints  Initially in place: No    Plan:  Times per week: 5 X GM  Times per day: Daily  Specific instructions for Next Treatment: B LE strengthening, bed mobility, transfer training, gait training, stair training  Current Treatment Recommendations: Strengthening, Functional Mobility Training, Neuromuscular Re-education, Home Exercise Program, Transfer

## 2018-01-22 NOTE — PROGRESS NOTES
performed by Radha Castano MD at [address] Left 7/9/2012    TONSILLECTOMY      TOTAL HIP ARTHROPLASTY  right 1994; left 1996    TOTAL KNEE ARTHROPLASTY  left and right 1998    UPPER GASTROINTESTINAL ENDOSCOPY  2013           Subjective  Chart Reviewed: Yes  Patient assessed for rehabilitation services?: Yes  Family / Caregiver Present: No    Subjective: RN okayed OT session. Upon arrival patient was sitting up in recliner. Pt was agreeable to OT session. General:  Overall Orientation Status: Within Functional Limits    Vision: Within Functional Limits    Hearing: Exceptions to [facility]  Hearing Exceptions: Hard of hearing/hearing concerns    Pain:  Pain Assessment  Patient Currently in Pain: Denies       Social/Functional:  Lives With: Alone  Type of Home: House  Home Layout: One level  Home Access: Stairs to enter without rails  Entrance Stairs - Number of Steps: 1 MAURICE  Home Equipment: Rolling walker, Cane     Bathroom Shower/Tub: Walk-in shower  Bathroom Toilet: Handicap height  Bathroom Equipment: Grab bars around toilet, Grab bars in shower  Bathroom Accessibility: Accessible     ADL Assistance: Independent  Homemaking Assistance: Independent  Homemaking Responsibilities: Yes    Ambulation Assistance: Independent  Transfer Assistance: Independent    Active : No  Occupation: Retired  Additional Comments: Pt states occasional use of SC or RW, states she does use the wall or other objects for support    Objective  Overall Cognitive Status: Exceptions  Arousal/Alertness: Appropriate responses to stimuli  Following Commands:  Follows one step commands with increased time, Follows one step commands with repetition  Attention Span: Appears intact  Problem Solving: Decreased awareness of errors  Initiation: Requires cues for some  Sequencing: Requires cues for some    Sensation  Overall Sensation Status: WFL         LUE AROM (degrees)  LUE AROM : WFL     RUE AROM (degrees)  RUE AROM :

## 2018-01-23 ENCOUNTER — CARE COORDINATION (OUTPATIENT)
Dept: CASE MANAGEMENT | Age: 83
End: 2018-01-23

## 2018-01-23 DIAGNOSIS — K57.91 DIVERTICULOSIS OF INTESTINE WITH BLEEDING, UNSPECIFIED INTESTINAL TRACT LOCATION: Primary | ICD-10-CM

## 2018-01-23 PROCEDURE — 1111F DSCHRG MED/CURRENT MED MERGE: CPT | Performed by: FAMILY MEDICINE

## 2018-01-26 ENCOUNTER — HOSPITAL ENCOUNTER (OUTPATIENT)
Age: 83
Discharge: HOME | End: 2018-01-26
Payer: MEDICARE

## 2018-01-26 DIAGNOSIS — Z87.19 HISTORY OF LOWER GI BLEEDING: ICD-10-CM

## 2018-01-26 DIAGNOSIS — D50.0 IRON DEFICIENCY ANEMIA DUE TO CHRONIC BLOOD LOSS: ICD-10-CM

## 2018-01-26 LAB
HCT VFR BLD CALC: 29.9 % (ref 37–47)
HEMOGLOBIN: 10.4 GM/DL (ref 12–16)
MCH RBC QN AUTO: 30.5 PG (ref 27–31)
MCHC RBC AUTO-ENTMCNC: 34.6 GM/DL (ref 33–37)
MCV RBC AUTO: 88 FL (ref 81–99)
PDW BLD-RTO: 16 % (ref 11.5–14.5)
PLATELET # BLD: 256 THOU/MM3 (ref 130–400)
PMV BLD AUTO: 8.1 MCM (ref 7.4–10.4)
RBC # BLD: 3.4 MILL/MM3 (ref 4.2–5.4)
WBC # BLD: 9.8 THOU/MM3 (ref 4.8–10.8)

## 2018-01-26 PROCEDURE — 36415 COLL VENOUS BLD VENIPUNCTURE: CPT

## 2018-01-26 PROCEDURE — 85027 COMPLETE CBC AUTOMATED: CPT

## 2018-01-29 ENCOUNTER — OFFICE VISIT (OUTPATIENT)
Dept: FAMILY MEDICINE CLINIC | Age: 83
End: 2018-01-29

## 2018-01-29 VITALS
WEIGHT: 167 LBS | HEART RATE: 72 BPM | BODY MASS INDEX: 24.73 KG/M2 | RESPIRATION RATE: 16 BRPM | DIASTOLIC BLOOD PRESSURE: 80 MMHG | SYSTOLIC BLOOD PRESSURE: 146 MMHG | HEIGHT: 69 IN

## 2018-01-29 DIAGNOSIS — I10 ESSENTIAL HYPERTENSION: ICD-10-CM

## 2018-01-29 PROCEDURE — 99496 TRANSJ CARE MGMT HIGH F2F 7D: CPT | Performed by: FAMILY MEDICINE

## 2018-01-29 RX ORDER — LANOLIN ALCOHOL/MO/W.PET/CERES
325 CREAM (GRAM) TOPICAL 2 TIMES DAILY
COMMUNITY
Start: 2018-01-29

## 2018-01-29 RX ORDER — LOSARTAN POTASSIUM AND HYDROCHLOROTHIAZIDE 12.5; 1 MG/1; MG/1
1 TABLET ORAL DAILY
Qty: 90 TABLET | Refills: 3 | Status: SHIPPED | OUTPATIENT
Start: 2018-01-29 | End: 2018-11-05

## 2018-01-29 NOTE — PROGRESS NOTES
(8AM AND 8PM)             potassium chloride (KLOR-CON M) 20 MEQ extended release tablet  Take 1 tablet by mouth daily             psyllium (METAMUCIL) 0.52 g capsule  Take 1 capsule by mouth daily                   Medications marked \"taking\" at this time  Outpatient Prescriptions Marked as Taking for the 1/29/18 encounter (Office Visit) with Aquiles Singleton MD   Medication Sig Dispense Refill    hydrALAZINE (APRESOLINE) 10 MG tablet Take 1 tablet by mouth 3 times daily 90 tablet 3    metoprolol tartrate (LOPRESSOR) 25 MG tablet take 1 tablet by mouth twice a day (8AM AND 8PM) 180 tablet 3    losartan-hydrochlorothiazide (HYZAAR) 100-12.5 MG per tablet Take 1 tablet by mouth daily 90 tablet 3    bumetanide (BUMEX) 1 MG tablet Take 1 tablet by mouth daily 90 tablet 3    potassium chloride (KLOR-CON M) 20 MEQ extended release tablet Take 1 tablet by mouth daily 60 tablet 3        Medications patient taking as of now reconciled against medications ordered at time of hospital discharge     Vitals:    01/29/18 1153   BP: (!) 146/80   Site: Right Arm   Position: Sitting   Cuff Size: Medium Adult   Pulse: 72   Resp: 16   Weight: 167 lb (75.8 kg)   Height: 5' 9\" (1.753 m)     Body mass index is 24.66 kg/m². Wt Readings from Last 3 Encounters:   01/29/18 167 lb (75.8 kg)   01/26/18 164 lb (74.4 kg)   01/22/18 165 lb 3.2 oz (74.9 kg)     BP Readings from Last 3 Encounters:   01/29/18 (!) 146/80   01/26/18 (!) 152/76   01/22/18 (!) 118/55        Inpatient course: Discharge summary reviewed- see chart.     Chief Complaint   Patient presents with   [address] from [place] [address] Impaction     History of Present illness - Follow up of Hospital diagnosis(es): she had the recent GI bleed at [facility]      Non face to face  following discharge, date last encounter closed (first attempt may have been earlier): 1/23/2018  1:09 PM    Call initiated 2 business days of discharge: Yes     Interval history/Current

## 2018-01-30 ENCOUNTER — CARE COORDINATION (OUTPATIENT)
Dept: CASE MANAGEMENT | Age: 83
End: 2018-01-30

## 2018-02-06 ENCOUNTER — CARE COORDINATION (OUTPATIENT)
Dept: CASE MANAGEMENT | Age: 83
End: 2018-02-06

## 2018-02-06 NOTE — CARE COORDINATION
[facility] Transitions Follow Up Call    2018    Patient: Zahra Jacobson  Patient : 10/3/1931   MRN: [id number]  Reason for Admission: Lower GIB   Discharge Date: 18 RARS: Risk Score: 17.5 CMRS 5       Spoke with: HEATHER DE JESUS Mague [facility] Transitions Subsequent and Final Call    Subsequent and Final Calls  Do you have any ongoing symptoms?:  No  Have your medications changed?:  No  Do you have any questions related to your medications?:  No  Do you currently have any active services?:  Yes  Are you currently active with any services?:  Home Health  Do you have any needs or concerns that I can assist you with?:  No  Identified Barriers:  None  Care Transitions Interventions  No Identified Needs     Other Services:  Completed   Other Interventions:        Spoke with Kimberly Louis for final care transition call. She reports feeling \"good\" today. She denies any abdominal pain or rectal bleeding. She reports that the nurse from [facility] was out today. Kimberly Louis denies any needs, questions, or concerns at this time and is agreeable to final call today.     Follow Up  Future Appointments  Date Time Provider Minda Trinidad   2018 11:00 AM DIGNA Phelan Asuncion More   2018 11:10 AM MD SOPHIA Tidwell   2018 2:10 PM Karan Mclaughlin MD [address] SOPHIA Stone, RN

## 2018-02-28 ENCOUNTER — OFFICE VISIT (OUTPATIENT)
Dept: FAMILY MEDICINE CLINIC | Age: 83
End: 2018-02-28

## 2018-02-28 VITALS
HEART RATE: 60 BPM | HEIGHT: 69 IN | DIASTOLIC BLOOD PRESSURE: 70 MMHG | WEIGHT: 169 LBS | BODY MASS INDEX: 25.03 KG/M2 | RESPIRATION RATE: 16 BRPM | SYSTOLIC BLOOD PRESSURE: 148 MMHG

## 2018-02-28 DIAGNOSIS — D69.6 ACQUIRED THROMBOCYTOPENIA (HCC): ICD-10-CM

## 2018-02-28 DIAGNOSIS — I50.32 HEART FAILURE, DIASTOLIC, CHRONIC (HCC): ICD-10-CM

## 2018-02-28 DIAGNOSIS — D64.9 ANEMIA, UNSPECIFIED TYPE: Primary | ICD-10-CM

## 2018-02-28 LAB — HGB, POC: 11.8

## 2018-02-28 PROCEDURE — 99213 OFFICE O/P EST LOW 20 MIN: CPT | Performed by: FAMILY MEDICINE

## 2018-02-28 PROCEDURE — 85018 HEMOGLOBIN: CPT | Performed by: FAMILY MEDICINE

## 2018-02-28 ASSESSMENT — ENCOUNTER SYMPTOMS
CONSTIPATION: 0
SINUS PRESSURE: 0
SHORTNESS OF BREATH: 0

## 2018-03-22 ENCOUNTER — OFFICE VISIT (OUTPATIENT)
Dept: FAMILY MEDICINE CLINIC | Age: 83
End: 2018-03-22

## 2018-03-22 VITALS
BODY MASS INDEX: 25.1 KG/M2 | HEART RATE: 60 BPM | RESPIRATION RATE: 16 BRPM | WEIGHT: 169.5 LBS | DIASTOLIC BLOOD PRESSURE: 60 MMHG | SYSTOLIC BLOOD PRESSURE: 142 MMHG | HEIGHT: 69 IN

## 2018-03-22 DIAGNOSIS — D64.9 ANEMIA, UNSPECIFIED TYPE: Primary | ICD-10-CM

## 2018-03-22 LAB — HGB, POC: 12.8

## 2018-03-22 PROCEDURE — 99213 OFFICE O/P EST LOW 20 MIN: CPT | Performed by: FAMILY MEDICINE

## 2018-03-22 PROCEDURE — 85018 HEMOGLOBIN: CPT | Performed by: FAMILY MEDICINE

## 2018-05-03 ENCOUNTER — OFFICE VISIT (OUTPATIENT)
Dept: FAMILY MEDICINE CLINIC | Age: 83
End: 2018-05-03

## 2018-05-03 VITALS
WEIGHT: 171 LBS | DIASTOLIC BLOOD PRESSURE: 70 MMHG | SYSTOLIC BLOOD PRESSURE: 152 MMHG | HEART RATE: 72 BPM | HEIGHT: 69 IN | RESPIRATION RATE: 16 BRPM | BODY MASS INDEX: 25.33 KG/M2

## 2018-05-03 DIAGNOSIS — I77.9 BILATERAL CAROTID ARTERY DISEASE (HCC): ICD-10-CM

## 2018-05-03 DIAGNOSIS — I10 ESSENTIAL HYPERTENSION: ICD-10-CM

## 2018-05-03 DIAGNOSIS — D50.9 IRON DEFICIENCY ANEMIA, UNSPECIFIED IRON DEFICIENCY ANEMIA TYPE: Primary | ICD-10-CM

## 2018-05-03 LAB — HGB, POC: 12.8

## 2018-05-03 PROCEDURE — 99213 OFFICE O/P EST LOW 20 MIN: CPT | Performed by: FAMILY MEDICINE

## 2018-05-03 PROCEDURE — 85018 HEMOGLOBIN: CPT | Performed by: FAMILY MEDICINE

## 2018-05-03 RX ORDER — DOCUSATE SODIUM 100 MG/1
CAPSULE, LIQUID FILLED ORAL
Refills: 1 | Status: ON HOLD | COMMUNITY
Start: 2018-04-24 | End: 2019-05-18 | Stop reason: HOSPADM

## 2018-05-03 ASSESSMENT — ENCOUNTER SYMPTOMS
SINUS PRESSURE: 0
SHORTNESS OF BREATH: 0
CONSTIPATION: 0

## 2018-05-10 ENCOUNTER — NURSE ONLY (OUTPATIENT)
Dept: FAMILY MEDICINE CLINIC | Age: 83
End: 2018-05-10

## 2018-05-10 VITALS — SYSTOLIC BLOOD PRESSURE: 168 MMHG | DIASTOLIC BLOOD PRESSURE: 70 MMHG

## 2018-05-10 DIAGNOSIS — I10 ESSENTIAL HYPERTENSION: ICD-10-CM

## 2018-05-17 ENCOUNTER — NURSE ONLY (OUTPATIENT)
Dept: FAMILY MEDICINE CLINIC | Age: 83
End: 2018-05-17

## 2018-05-17 VITALS — SYSTOLIC BLOOD PRESSURE: 144 MMHG | DIASTOLIC BLOOD PRESSURE: 74 MMHG

## 2018-05-17 DIAGNOSIS — I10 ESSENTIAL HYPERTENSION: Primary | ICD-10-CM

## 2018-05-29 RX ORDER — HYDRALAZINE HYDROCHLORIDE 10 MG/1
10 TABLET, FILM COATED ORAL 3 TIMES DAILY
Qty: 90 TABLET | Refills: 3 | Status: SHIPPED | OUTPATIENT
Start: 2018-05-29 | End: 2018-11-05

## 2018-05-30 ASSESSMENT — ENCOUNTER SYMPTOMS
SINUS PRESSURE: 0
SHORTNESS OF BREATH: 0
CONSTIPATION: 0

## 2018-06-04 ENCOUNTER — TELEPHONE (OUTPATIENT)
Dept: FAMILY MEDICINE CLINIC | Age: 83
End: 2018-06-04

## 2018-06-04 DIAGNOSIS — R15.9 ENCOPRESIS: ICD-10-CM

## 2018-06-04 DIAGNOSIS — R53.1 WEAKNESS GENERALIZED: Primary | ICD-10-CM

## 2018-06-07 ENCOUNTER — HOSPITAL ENCOUNTER (EMERGENCY)
Age: 83
Discharge: HOME | End: 2018-06-07
Payer: MEDICARE

## 2018-06-07 ENCOUNTER — APPOINTMENT (OUTPATIENT)
Dept: GENERAL RADIOLOGY | Age: 83
End: 2018-06-07
Payer: MEDICARE

## 2018-06-07 VITALS
OXYGEN SATURATION: 95 % | BODY MASS INDEX: 24.34 KG/M2 | HEIGHT: 70 IN | SYSTOLIC BLOOD PRESSURE: 139 MMHG | TEMPERATURE: 98.4 F | HEART RATE: 64 BPM | RESPIRATION RATE: 17 BRPM | DIASTOLIC BLOOD PRESSURE: 59 MMHG | WEIGHT: 170 LBS

## 2018-06-07 DIAGNOSIS — N39.0 URINARY TRACT INFECTION WITHOUT HEMATURIA, SITE UNSPECIFIED: ICD-10-CM

## 2018-06-07 DIAGNOSIS — R53.1 GENERAL WEAKNESS: Primary | ICD-10-CM

## 2018-06-07 LAB
ALBUMIN SERPL-MCNC: 3.4 G/DL (ref 3.5–5.1)
ALP BLD-CCNC: 121 U/L (ref 38–126)
ALT SERPL-CCNC: 39 U/L (ref 11–66)
AMORPHOUS: ABNORMAL
ANION GAP SERPL CALCULATED.3IONS-SCNC: 17 MEQ/L (ref 8–16)
ANISOCYTOSIS: ABNORMAL
AST SERPL-CCNC: 41 U/L (ref 5–40)
BACTERIA: ABNORMAL /HPF
BASOPHILS # BLD: 0.1 %
BASOPHILS ABSOLUTE: 0 THOU/MM3 (ref 0–0.1)
BILIRUB SERPL-MCNC: 1.6 MG/DL (ref 0.3–1.2)
BILIRUBIN URINE: NEGATIVE
BLOOD, URINE: ABNORMAL
BUN BLDV-MCNC: 15 MG/DL (ref 7–22)
CALCIUM SERPL-MCNC: 8.7 MG/DL (ref 8.5–10.5)
CASTS UA: ABNORMAL /LPF
CHARACTER, URINE: ABNORMAL
CHLORIDE BLD-SCNC: 97 MEQ/L (ref 98–111)
CO2: 22 MEQ/L (ref 23–33)
COLOR: ABNORMAL
CREAT SERPL-MCNC: 0.5 MG/DL (ref 0.4–1.2)
CRYSTALS, UA: ABNORMAL
EKG ATRIAL RATE: 78 BPM
EKG P AXIS: 29 DEGREES
EKG P-R INTERVAL: 212 MS
EKG Q-T INTERVAL: 444 MS
EKG QRS DURATION: 142 MS
EKG QTC CALCULATION (BAZETT): 506 MS
EKG R AXIS: -24 DEGREES
EKG T AXIS: 114 DEGREES
EKG VENTRICULAR RATE: 78 BPM
EOSINOPHIL # BLD: 0.1 %
EOSINOPHILS ABSOLUTE: 0 THOU/MM3 (ref 0–0.4)
EPITHELIAL CELLS, UA: ABNORMAL /HPF
GFR SERPL CREATININE-BSD FRML MDRD: > 90 ML/MIN/1.73M2
GLUCOSE BLD-MCNC: 126 MG/DL (ref 70–108)
GLUCOSE URINE: NEGATIVE MG/DL
HCT VFR BLD CALC: 36.4 % (ref 37–47)
HEMOGLOBIN: 12.5 GM/DL (ref 12–16)
KETONES, URINE: NEGATIVE
LEUKOCYTE ESTERASE, URINE: ABNORMAL
LYMPHOCYTES # BLD: 6.9 %
LYMPHOCYTES ABSOLUTE: 0.9 THOU/MM3 (ref 1–4.8)
MCH RBC QN AUTO: 31 PG (ref 27–31)
MCHC RBC AUTO-ENTMCNC: 34.3 GM/DL (ref 33–37)
MCV RBC AUTO: 90.4 FL (ref 81–99)
MONOCYTES # BLD: 12.8 %
MONOCYTES ABSOLUTE: 1.7 THOU/MM3 (ref 0.4–1.3)
MUCUS: ABNORMAL
NITRITE, URINE: NEGATIVE
NUCLEATED RED BLOOD CELLS: 0 /100 WBC
OSMOLALITY CALCULATION: 274.3 MOSMOL/KG (ref 275–300)
PDW BLD-RTO: 18.2 % (ref 11.5–14.5)
PH UA: 5
PLATELET # BLD: 147 THOU/MM3 (ref 130–400)
PMV BLD AUTO: 8.9 FL (ref 7.4–10.4)
POTASSIUM SERPL-SCNC: 3.5 MEQ/L (ref 3.5–5.2)
PROTEIN UA: 30
RBC # BLD: 4.03 MILL/MM3 (ref 4.2–5.4)
RBC URINE: ABNORMAL /HPF
SEG NEUTROPHILS: 80.1 %
SEGMENTED NEUTROPHILS ABSOLUTE COUNT: 10.5 THOU/MM3 (ref 1.8–7.7)
SODIUM BLD-SCNC: 136 MEQ/L (ref 135–145)
SPECIFIC GRAVITY, URINE: 1.01 (ref 1–1.03)
TOTAL PROTEIN: 6.9 G/DL (ref 6.1–8)
TROPONIN T: < 0.01 NG/ML
TSH SERPL DL<=0.05 MIU/L-ACNC: 1.73 UIU/ML (ref 0.4–4.2)
UROBILINOGEN, URINE: 1 EU/DL
WBC # BLD: 13.1 THOU/MM3 (ref 4.8–10.8)
WBC UA: > 100 /HPF

## 2018-06-07 PROCEDURE — 84484 ASSAY OF TROPONIN QUANT: CPT

## 2018-06-07 PROCEDURE — 93005 ELECTROCARDIOGRAM TRACING: CPT | Performed by: EMERGENCY MEDICINE

## 2018-06-07 PROCEDURE — 80053 COMPREHEN METABOLIC PANEL: CPT

## 2018-06-07 PROCEDURE — 87184 SC STD DISK METHOD PER PLATE: CPT

## 2018-06-07 PROCEDURE — 85025 COMPLETE CBC W/AUTO DIFF WBC: CPT

## 2018-06-07 PROCEDURE — 87086 URINE CULTURE/COLONY COUNT: CPT

## 2018-06-07 PROCEDURE — 93010 ELECTROCARDIOGRAM REPORT: CPT | Performed by: INTERNAL MEDICINE

## 2018-06-07 PROCEDURE — 36415 COLL VENOUS BLD VENIPUNCTURE: CPT

## 2018-06-07 PROCEDURE — 99285 EMERGENCY DEPT VISIT HI MDM: CPT

## 2018-06-07 PROCEDURE — 84443 ASSAY THYROID STIM HORMONE: CPT

## 2018-06-07 PROCEDURE — 87186 SC STD MICRODIL/AGAR DIL: CPT

## 2018-06-07 PROCEDURE — 71046 X-RAY EXAM CHEST 2 VIEWS: CPT

## 2018-06-07 PROCEDURE — 81001 URINALYSIS AUTO W/SCOPE: CPT

## 2018-06-07 PROCEDURE — 87077 CULTURE AEROBIC IDENTIFY: CPT

## 2018-06-07 RX ORDER — CIPROFLOXACIN 500 MG/1
500 TABLET, FILM COATED ORAL 2 TIMES DAILY
Qty: 10 TABLET | Refills: 0 | Status: SHIPPED | OUTPATIENT
Start: 2018-06-07 | End: 2018-06-12

## 2018-06-07 ASSESSMENT — ENCOUNTER SYMPTOMS
SHORTNESS OF BREATH: 0
WHEEZING: 0
BLOOD IN STOOL: 0
ABDOMINAL PAIN: 0
COUGH: 0
SORE THROAT: 0
EYE DISCHARGE: 0
VOMITING: 0
DIARRHEA: 0
RHINORRHEA: 0
NAUSEA: 0
BACK PAIN: 0
EYE PAIN: 0

## 2018-06-09 LAB
ORGANISM: ABNORMAL
ORGANISM: ABNORMAL
URINE CULTURE REFLEX: ABNORMAL
URINE CULTURE REFLEX: ABNORMAL

## 2018-10-01 ENCOUNTER — CARE COORDINATION (OUTPATIENT)
Dept: CARE COORDINATION | Age: 83
End: 2018-10-01

## 2018-10-10 ENCOUNTER — TELEPHONE (OUTPATIENT)
Dept: FAMILY MEDICINE CLINIC | Age: 83
End: 2018-10-10

## 2018-10-10 NOTE — TELEPHONE ENCOUNTER
----- Message from Eric Nielson MD sent at 10/9/2018  5:20 PM EDT -----  Check with her to see if she is using [facility] for her catheters.

## 2018-10-31 ENCOUNTER — CARE COORDINATION (OUTPATIENT)
Dept: CARE COORDINATION | Age: 83
End: 2018-10-31

## 2018-11-01 ENCOUNTER — CARE COORDINATION (OUTPATIENT)
Dept: CASE MANAGEMENT | Age: 83
End: 2018-11-01

## 2018-11-02 ENCOUNTER — CARE COORDINATION (OUTPATIENT)
Dept: CARE COORDINATION | Age: 83
End: 2018-11-02

## 2018-11-02 NOTE — CARE COORDINATION
Name: Lucas Godoy  YOB: 1931  MRN: [id number]    Encounter ID: [id number]  Arrival Date: N/A  Discharge Date: N/A    Related to: CHF High Touch UA (CHF High Touch UA) ([email or web address])    Questions     Question 1   Shortness of Breath   Please press 1 if yes; Press 2 if no   Shortness of Breath (Issue Panel: High Priority CHF)  Required Interventions and Feedback     Call Status       *Call Status (Required):  Patient Reached (edited by Sindy Snellen on 11/02/2018 02:30 PM EDT)    Call status details:  Patient states she hit wrong button and is not having any problems.  (edited by Sindy Snellen on 11/02/2018 02:31 PM EDT)       Pre-Call Opportunity Review       Low Priority :  Yes (edited by Sindy Snellen on 11/02/2018 02:29 PM EDT)       General Symptoms and Assessment   These symptoms may apply to all patients with all diseases. General symptom details:  None (edited by Sindy Snellen on 11/02/2018 02:31 PM EDT)       CHF Symptoms and Assessment        Symptom Free Text Details:  No CHF problems today.  (edited by Sindy Snellen on 11/02/2018 02:31 PM EDT)      Jesse Garcia LPN  [address]

## 2018-11-05 ENCOUNTER — OFFICE VISIT (OUTPATIENT)
Dept: FAMILY MEDICINE CLINIC | Age: 83
End: 2018-11-05

## 2018-11-05 VITALS
BODY MASS INDEX: 24.93 KG/M2 | HEART RATE: 88 BPM | SYSTOLIC BLOOD PRESSURE: 148 MMHG | RESPIRATION RATE: 16 BRPM | HEIGHT: 70 IN | DIASTOLIC BLOOD PRESSURE: 72 MMHG | WEIGHT: 174.13 LBS

## 2018-11-05 DIAGNOSIS — I10 ESSENTIAL HYPERTENSION: Primary | ICD-10-CM

## 2018-11-05 DIAGNOSIS — Z23 NEED FOR INFLUENZA VACCINATION: ICD-10-CM

## 2018-11-05 PROCEDURE — 1101F PT FALLS ASSESS-DOCD LE1/YR: CPT | Performed by: FAMILY MEDICINE

## 2018-11-05 PROCEDURE — 99214 OFFICE O/P EST MOD 30 MIN: CPT | Performed by: FAMILY MEDICINE

## 2018-11-05 PROCEDURE — G0008 ADMIN INFLUENZA VIRUS VAC: HCPCS | Performed by: FAMILY MEDICINE

## 2018-11-05 PROCEDURE — 90656 IIV3 VACC NO PRSV 0.5 ML IM: CPT | Performed by: FAMILY MEDICINE

## 2018-11-05 RX ORDER — HYDRALAZINE HYDROCHLORIDE 10 MG/1
10 TABLET, FILM COATED ORAL 3 TIMES DAILY
Qty: 90 TABLET | Refills: 3 | Status: SHIPPED | OUTPATIENT
Start: 2018-11-05 | End: 2019-02-27 | Stop reason: SDUPTHER

## 2018-11-05 RX ORDER — HYDRALAZINE HYDROCHLORIDE 10 MG/1
10 TABLET, FILM COATED ORAL 3 TIMES DAILY
Status: ON HOLD | COMMUNITY
End: 2019-05-17 | Stop reason: HOSPADM

## 2018-11-05 ASSESSMENT — PATIENT HEALTH QUESTIONNAIRE - PHQ9
2. FEELING DOWN, DEPRESSED OR HOPELESS: 0
SUM OF ALL RESPONSES TO PHQ QUESTIONS 1-9: 0
SUM OF ALL RESPONSES TO PHQ QUESTIONS 1-9: 0
SUM OF ALL RESPONSES TO PHQ9 QUESTIONS 1 & 2: 0
1. LITTLE INTEREST OR PLEASURE IN DOING THINGS: 0

## 2018-11-05 ASSESSMENT — ENCOUNTER SYMPTOMS
CONSTIPATION: 0
SHORTNESS OF BREATH: 0
SINUS PRESSURE: 0

## 2018-11-15 ENCOUNTER — CARE COORDINATION (OUTPATIENT)
Dept: CARE COORDINATION | Age: 83
End: 2018-11-15

## 2018-12-12 ENCOUNTER — CARE COORDINATION (OUTPATIENT)
Dept: CARE COORDINATION | Age: 83
End: 2018-12-12

## 2019-02-19 DIAGNOSIS — I10 ESSENTIAL HYPERTENSION: ICD-10-CM

## 2019-02-27 DIAGNOSIS — I10 ESSENTIAL HYPERTENSION: ICD-10-CM

## 2019-02-27 RX ORDER — HYDRALAZINE HYDROCHLORIDE 10 MG/1
TABLET, FILM COATED ORAL
Qty: 90 TABLET | Refills: 3 | Status: ON HOLD | OUTPATIENT
Start: 2019-02-27 | End: 2019-05-17 | Stop reason: HOSPADM

## 2019-05-06 ENCOUNTER — OFFICE VISIT (OUTPATIENT)
Dept: FAMILY MEDICINE CLINIC | Age: 84
End: 2019-05-06

## 2019-05-06 VITALS
HEIGHT: 70 IN | BODY MASS INDEX: 25.79 KG/M2 | DIASTOLIC BLOOD PRESSURE: 74 MMHG | SYSTOLIC BLOOD PRESSURE: 122 MMHG | RESPIRATION RATE: 14 BRPM | WEIGHT: 180.13 LBS | HEART RATE: 68 BPM

## 2019-05-06 DIAGNOSIS — I50.32 CHRONIC DIASTOLIC CHF (CONGESTIVE HEART FAILURE) (HCC): ICD-10-CM

## 2019-05-06 DIAGNOSIS — D69.6 THROMBOCYTOPENIA (HCC): ICD-10-CM

## 2019-05-06 DIAGNOSIS — S81.811A LACERATION OF RIGHT LOWER LEG, INITIAL ENCOUNTER: Primary | ICD-10-CM

## 2019-05-06 PROCEDURE — G8427 DOCREV CUR MEDS BY ELIG CLIN: HCPCS | Performed by: FAMILY MEDICINE

## 2019-05-06 PROCEDURE — 99213 OFFICE O/P EST LOW 20 MIN: CPT | Performed by: FAMILY MEDICINE

## 2019-05-06 PROCEDURE — 1123F ACP DISCUSS/DSCN MKR DOCD: CPT | Performed by: FAMILY MEDICINE

## 2019-05-06 PROCEDURE — 1036F TOBACCO NON-USER: CPT | Performed by: FAMILY MEDICINE

## 2019-05-06 PROCEDURE — 4040F PNEUMOC VAC/ADMIN/RCVD: CPT | Performed by: FAMILY MEDICINE

## 2019-05-06 PROCEDURE — G8419 CALC BMI OUT NRM PARAM NOF/U: HCPCS | Performed by: FAMILY MEDICINE

## 2019-05-06 PROCEDURE — 1090F PRES/ABSN URINE INCON ASSESS: CPT | Performed by: FAMILY MEDICINE

## 2019-05-06 ASSESSMENT — ENCOUNTER SYMPTOMS
CONSTIPATION: 0
SINUS PRESSURE: 0
SHORTNESS OF BREATH: 0

## 2019-05-06 ASSESSMENT — PATIENT HEALTH QUESTIONNAIRE - PHQ9
SUM OF ALL RESPONSES TO PHQ9 QUESTIONS 1 & 2: 0
SUM OF ALL RESPONSES TO PHQ QUESTIONS 1-9: 0
2. FEELING DOWN, DEPRESSED OR HOPELESS: 0
1. LITTLE INTEREST OR PLEASURE IN DOING THINGS: 0
SUM OF ALL RESPONSES TO PHQ QUESTIONS 1-9: 0

## 2019-05-06 NOTE — PATIENT INSTRUCTIONS
See me in 6 months  Let me know if the injury to the lower leg looks worse  Use the walker in the house all the time

## 2019-05-13 ENCOUNTER — APPOINTMENT (OUTPATIENT)
Dept: CT IMAGING | Age: 84
DRG: 418 | End: 2019-05-13
Payer: MEDICARE

## 2019-05-13 ENCOUNTER — HOSPITAL ENCOUNTER (INPATIENT)
Age: 84
LOS: 4 days | Discharge: NURSING, REHAB OR LONG-TERM CARE | DRG: 418 | End: 2019-05-18
Attending: FAMILY MEDICINE | Admitting: INTERNAL MEDICINE
Payer: MEDICARE

## 2019-05-13 DIAGNOSIS — K81.0 ACUTE CHOLECYSTITIS: Primary | ICD-10-CM

## 2019-05-13 LAB
ALBUMIN SERPL-MCNC: 4.2 G/DL (ref 3.5–5.1)
ALP BLD-CCNC: 166 U/L (ref 38–126)
ALT SERPL-CCNC: 44 U/L (ref 11–66)
ANION GAP SERPL CALCULATED.3IONS-SCNC: 12 MEQ/L (ref 8–16)
AST SERPL-CCNC: 76 U/L (ref 5–40)
BASOPHILS # BLD: 0.4 %
BASOPHILS ABSOLUTE: 0 THOU/MM3 (ref 0–0.1)
BILIRUB SERPL-MCNC: 0.8 MG/DL (ref 0.3–1.2)
BUN BLDV-MCNC: 17 MG/DL (ref 7–22)
CALCIUM SERPL-MCNC: 9.6 MG/DL (ref 8.5–10.5)
CHLORIDE BLD-SCNC: 100 MEQ/L (ref 98–111)
CO2: 26 MEQ/L (ref 23–33)
CREAT SERPL-MCNC: 0.7 MG/DL (ref 0.4–1.2)
EKG ATRIAL RATE: 73 BPM
EKG P AXIS: 65 DEGREES
EKG P-R INTERVAL: 250 MS
EKG Q-T INTERVAL: 462 MS
EKG QRS DURATION: 144 MS
EKG QTC CALCULATION (BAZETT): 508 MS
EKG R AXIS: -3 DEGREES
EKG T AXIS: 123 DEGREES
EKG VENTRICULAR RATE: 73 BPM
EOSINOPHIL # BLD: 1.3 %
EOSINOPHILS ABSOLUTE: 0.2 THOU/MM3 (ref 0–0.4)
ERYTHROCYTE [DISTWIDTH] IN BLOOD BY AUTOMATED COUNT: 16.4 % (ref 11.5–14.5)
ERYTHROCYTE [DISTWIDTH] IN BLOOD BY AUTOMATED COUNT: 56 FL (ref 35–45)
GFR SERPL CREATININE-BSD FRML MDRD: 79 ML/MIN/1.73M2
GLUCOSE BLD-MCNC: 121 MG/DL (ref 70–108)
HCT VFR BLD CALC: 43.8 % (ref 37–47)
HEMOGLOBIN: 13.9 GM/DL (ref 12–16)
IMMATURE GRANS (ABS): 0.07 THOU/MM3 (ref 0–0.07)
IMMATURE GRANULOCYTES: 0.6 %
INR BLD: 0.9 (ref 0.85–1.13)
LYMPHOCYTES # BLD: 6.6 %
LYMPHOCYTES ABSOLUTE: 0.8 THOU/MM3 (ref 1–4.8)
MAGNESIUM: 1.9 MG/DL (ref 1.6–2.4)
MCH RBC QN AUTO: 30 PG (ref 26–33)
MCHC RBC AUTO-ENTMCNC: 31.7 GM/DL (ref 32.2–35.5)
MCV RBC AUTO: 94.4 FL (ref 81–99)
MONOCYTES # BLD: 6.9 %
MONOCYTES ABSOLUTE: 0.8 THOU/MM3 (ref 0.4–1.3)
NUCLEATED RED BLOOD CELLS: 0 /100 WBC
OSMOLALITY CALCULATION: 278.5 MOSMOL/KG (ref 275–300)
PLATELET # BLD: 208 THOU/MM3 (ref 130–400)
PMV BLD AUTO: 10.6 FL (ref 9.4–12.4)
POTASSIUM SERPL-SCNC: 3.5 MEQ/L (ref 3.5–5.2)
PRO-BNP: 2416 PG/ML (ref 0–1800)
RBC # BLD: 4.64 MILL/MM3 (ref 4.2–5.4)
SEG NEUTROPHILS: 84.2 %
SEGMENTED NEUTROPHILS ABSOLUTE COUNT: 10.1 THOU/MM3 (ref 1.8–7.7)
SODIUM BLD-SCNC: 138 MEQ/L (ref 135–145)
TOTAL PROTEIN: 7.6 G/DL (ref 6.1–8)
TROPONIN T: < 0.01 NG/ML
WBC # BLD: 12 THOU/MM3 (ref 4.8–10.8)

## 2019-05-13 PROCEDURE — 6360000004 HC RX CONTRAST MEDICATION: Performed by: FAMILY MEDICINE

## 2019-05-13 PROCEDURE — 96375 TX/PRO/DX INJ NEW DRUG ADDON: CPT

## 2019-05-13 PROCEDURE — 74177 CT ABD & PELVIS W/CONTRAST: CPT

## 2019-05-13 PROCEDURE — 87086 URINE CULTURE/COLONY COUNT: CPT

## 2019-05-13 PROCEDURE — 83880 ASSAY OF NATRIURETIC PEPTIDE: CPT

## 2019-05-13 PROCEDURE — 85610 PROTHROMBIN TIME: CPT

## 2019-05-13 PROCEDURE — 2500000003 HC RX 250 WO HCPCS: Performed by: FAMILY MEDICINE

## 2019-05-13 PROCEDURE — 2580000003 HC RX 258: Performed by: FAMILY MEDICINE

## 2019-05-13 PROCEDURE — 6360000002 HC RX W HCPCS: Performed by: FAMILY MEDICINE

## 2019-05-13 PROCEDURE — 6370000000 HC RX 637 (ALT 250 FOR IP): Performed by: FAMILY MEDICINE

## 2019-05-13 PROCEDURE — 83735 ASSAY OF MAGNESIUM: CPT

## 2019-05-13 PROCEDURE — 80053 COMPREHEN METABOLIC PANEL: CPT

## 2019-05-13 PROCEDURE — 84484 ASSAY OF TROPONIN QUANT: CPT

## 2019-05-13 PROCEDURE — 93005 ELECTROCARDIOGRAM TRACING: CPT | Performed by: FAMILY MEDICINE

## 2019-05-13 PROCEDURE — 87186 SC STD MICRODIL/AGAR DIL: CPT

## 2019-05-13 PROCEDURE — 85025 COMPLETE CBC W/AUTO DIFF WBC: CPT

## 2019-05-13 PROCEDURE — 36415 COLL VENOUS BLD VENIPUNCTURE: CPT

## 2019-05-13 PROCEDURE — 87184 SC STD DISK METHOD PER PLATE: CPT

## 2019-05-13 PROCEDURE — 99285 EMERGENCY DEPT VISIT HI MDM: CPT

## 2019-05-13 PROCEDURE — 87077 CULTURE AEROBIC IDENTIFY: CPT

## 2019-05-13 RX ORDER — ONDANSETRON 2 MG/ML
4 INJECTION INTRAMUSCULAR; INTRAVENOUS ONCE
Status: COMPLETED | OUTPATIENT
Start: 2019-05-13 | End: 2019-05-13

## 2019-05-13 RX ORDER — 0.9 % SODIUM CHLORIDE 0.9 %
1000 INTRAVENOUS SOLUTION INTRAVENOUS ONCE
Status: COMPLETED | OUTPATIENT
Start: 2019-05-13 | End: 2019-05-14

## 2019-05-13 RX ORDER — HYDRALAZINE HYDROCHLORIDE 20 MG/ML
10 INJECTION INTRAMUSCULAR; INTRAVENOUS ONCE
Status: COMPLETED | OUTPATIENT
Start: 2019-05-13 | End: 2019-05-13

## 2019-05-13 RX ADMIN — ONDANSETRON 4 MG: 2 INJECTION INTRAMUSCULAR; INTRAVENOUS at 22:24

## 2019-05-13 RX ADMIN — SODIUM CHLORIDE 1000 ML: 9 INJECTION, SOLUTION INTRAVENOUS at 22:19

## 2019-05-13 RX ADMIN — FAMOTIDINE 20 MG: 10 INJECTION, SOLUTION INTRAVENOUS at 22:19

## 2019-05-13 RX ADMIN — LIDOCAINE HYDROCHLORIDE: 20 SOLUTION ORAL; TOPICAL at 22:19

## 2019-05-13 RX ADMIN — HYDRALAZINE HYDROCHLORIDE 10 MG: 20 INJECTION INTRAMUSCULAR; INTRAVENOUS at 21:18

## 2019-05-13 RX ADMIN — IOPAMIDOL 80 ML: 755 INJECTION, SOLUTION INTRAVENOUS at 22:41

## 2019-05-13 ASSESSMENT — ENCOUNTER SYMPTOMS
SHORTNESS OF BREATH: 0
BACK PAIN: 0
ABDOMINAL PAIN: 0
NAUSEA: 0
EYE DISCHARGE: 0
WHEEZING: 0
COUGH: 0
EYE PAIN: 0
DIARRHEA: 0
VOMITING: 0
RHINORRHEA: 0
SORE THROAT: 0

## 2019-05-13 ASSESSMENT — PAIN DESCRIPTION - PAIN TYPE
TYPE: ACUTE PAIN

## 2019-05-13 ASSESSMENT — PAIN DESCRIPTION - LOCATION
LOCATION: ABDOMEN

## 2019-05-13 ASSESSMENT — PAIN SCALES - GENERAL
PAINLEVEL_OUTOF10: 5
PAINLEVEL_OUTOF10: 5
PAINLEVEL_OUTOF10: 10

## 2019-05-14 ENCOUNTER — ANESTHESIA (OUTPATIENT)
Dept: OPERATING ROOM | Age: 84
DRG: 418 | End: 2019-05-14
Payer: MEDICARE

## 2019-05-14 ENCOUNTER — ANESTHESIA EVENT (OUTPATIENT)
Dept: OPERATING ROOM | Age: 84
DRG: 418 | End: 2019-05-14
Payer: MEDICARE

## 2019-05-14 ENCOUNTER — APPOINTMENT (OUTPATIENT)
Dept: ULTRASOUND IMAGING | Age: 84
DRG: 418 | End: 2019-05-14
Payer: MEDICARE

## 2019-05-14 VITALS
OXYGEN SATURATION: 97 % | RESPIRATION RATE: 2 BRPM | SYSTOLIC BLOOD PRESSURE: 167 MMHG | DIASTOLIC BLOOD PRESSURE: 79 MMHG

## 2019-05-14 PROBLEM — K81.0 ACUTE CHOLECYSTITIS: Status: ACTIVE | Noted: 2019-05-14

## 2019-05-14 LAB
BACTERIA: ABNORMAL /HPF
BILIRUBIN URINE: NEGATIVE
BLOOD, URINE: NEGATIVE
CASTS 2: ABNORMAL /LPF
CASTS UA: ABNORMAL /LPF
CHARACTER, URINE: ABNORMAL
COLOR: ABNORMAL
CRYSTALS, UA: ABNORMAL
EPITHELIAL CELLS, UA: ABNORMAL /HPF
GLUCOSE URINE: NEGATIVE MG/DL
KETONES, URINE: NEGATIVE
LEUKOCYTE ESTERASE, URINE: ABNORMAL
MISCELLANEOUS 2: ABNORMAL
MUCUS: ABNORMAL
NITRITE, URINE: POSITIVE
PH UA: 5.5 (ref 5–9)
PROTEIN UA: NEGATIVE
RBC URINE: ABNORMAL /HPF
RENAL EPITHELIAL, UA: ABNORMAL
SPECIFIC GRAVITY, URINE: > 1.03 (ref 1–1.03)
UROBILINOGEN, URINE: 1 EU/DL (ref 0–1)
WBC UA: ABNORMAL /HPF
YEAST: ABNORMAL

## 2019-05-14 PROCEDURE — 2580000003 HC RX 258: Performed by: NURSE ANESTHETIST, CERTIFIED REGISTERED

## 2019-05-14 PROCEDURE — 3700000001 HC ADD 15 MINUTES (ANESTHESIA): Performed by: SURGERY

## 2019-05-14 PROCEDURE — 93010 ELECTROCARDIOGRAM REPORT: CPT | Performed by: INTERNAL MEDICINE

## 2019-05-14 PROCEDURE — 7100000001 HC PACU RECOVERY - ADDTL 15 MIN: Performed by: SURGERY

## 2019-05-14 PROCEDURE — 2580000003 HC RX 258: Performed by: SURGERY

## 2019-05-14 PROCEDURE — 2500000003 HC RX 250 WO HCPCS: Performed by: SURGERY

## 2019-05-14 PROCEDURE — S2900 ROBOTIC SURGICAL SYSTEM: HCPCS | Performed by: SURGERY

## 2019-05-14 PROCEDURE — 6360000002 HC RX W HCPCS: Performed by: FAMILY MEDICINE

## 2019-05-14 PROCEDURE — 88304 TISSUE EXAM BY PATHOLOGIST: CPT

## 2019-05-14 PROCEDURE — 3700000000 HC ANESTHESIA ATTENDED CARE: Performed by: SURGERY

## 2019-05-14 PROCEDURE — 2580000003 HC RX 258: Performed by: INTERNAL MEDICINE

## 2019-05-14 PROCEDURE — 76705 ECHO EXAM OF ABDOMEN: CPT

## 2019-05-14 PROCEDURE — 2500000003 HC RX 250 WO HCPCS: Performed by: NURSE ANESTHETIST, CERTIFIED REGISTERED

## 2019-05-14 PROCEDURE — 1200000003 HC TELEMETRY R&B

## 2019-05-14 PROCEDURE — 99222 1ST HOSP IP/OBS MODERATE 55: CPT | Performed by: SURGERY

## 2019-05-14 PROCEDURE — 3600000019 HC SURGERY ROBOT ADDTL 15MIN: Performed by: SURGERY

## 2019-05-14 PROCEDURE — 3600000009 HC SURGERY ROBOT BASE: Performed by: SURGERY

## 2019-05-14 PROCEDURE — 0FT44ZZ RESECTION OF GALLBLADDER, PERCUTANEOUS ENDOSCOPIC APPROACH: ICD-10-PCS | Performed by: SURGERY

## 2019-05-14 PROCEDURE — 2709999900 HC NON-CHARGEABLE SUPPLY

## 2019-05-14 PROCEDURE — 81001 URINALYSIS AUTO W/SCOPE: CPT

## 2019-05-14 PROCEDURE — 6360000002 HC RX W HCPCS: Performed by: INTERNAL MEDICINE

## 2019-05-14 PROCEDURE — 6360000002 HC RX W HCPCS: Performed by: SURGERY

## 2019-05-14 PROCEDURE — 47562 LAPAROSCOPIC CHOLECYSTECTOMY: CPT | Performed by: SURGERY

## 2019-05-14 PROCEDURE — 96365 THER/PROPH/DIAG IV INF INIT: CPT

## 2019-05-14 PROCEDURE — 7100000000 HC PACU RECOVERY - FIRST 15 MIN: Performed by: SURGERY

## 2019-05-14 PROCEDURE — 6360000002 HC RX W HCPCS: Performed by: NURSE ANESTHETIST, CERTIFIED REGISTERED

## 2019-05-14 PROCEDURE — 8E0W4CZ ROBOTIC ASSISTED PROCEDURE OF TRUNK REGION, PERCUTANEOUS ENDOSCOPIC APPROACH: ICD-10-PCS | Performed by: SURGERY

## 2019-05-14 PROCEDURE — 2580000003 HC RX 258: Performed by: FAMILY MEDICINE

## 2019-05-14 PROCEDURE — 2709999900 HC NON-CHARGEABLE SUPPLY: Performed by: SURGERY

## 2019-05-14 PROCEDURE — 2780000010 HC IMPLANT OTHER: Performed by: SURGERY

## 2019-05-14 PROCEDURE — 2500000003 HC RX 250 WO HCPCS: Performed by: INTERNAL MEDICINE

## 2019-05-14 DEVICE — Z DUP USE 2641840 CLIP INT L POLYMER LOK LIG HEM O LOK: Type: IMPLANTABLE DEVICE | Status: FUNCTIONAL

## 2019-05-14 RX ORDER — PROPOFOL 10 MG/ML
INJECTION, EMULSION INTRAVENOUS PRN
Status: DISCONTINUED | OUTPATIENT
Start: 2019-05-14 | End: 2019-05-14 | Stop reason: SDUPTHER

## 2019-05-14 RX ORDER — NEOSTIGMINE METHYLSULFATE 5 MG/5 ML
SYRINGE (ML) INTRAVENOUS PRN
Status: DISCONTINUED | OUTPATIENT
Start: 2019-05-14 | End: 2019-05-14 | Stop reason: SDUPTHER

## 2019-05-14 RX ORDER — PANTOPRAZOLE SODIUM 40 MG/10ML
40 INJECTION, POWDER, LYOPHILIZED, FOR SOLUTION INTRAVENOUS DAILY
Status: DISCONTINUED | OUTPATIENT
Start: 2019-05-14 | End: 2019-05-16

## 2019-05-14 RX ORDER — METOPROLOL TARTRATE 5 MG/5ML
2.5 INJECTION INTRAVENOUS EVERY 6 HOURS
Status: DISCONTINUED | OUTPATIENT
Start: 2019-05-14 | End: 2019-05-16

## 2019-05-14 RX ORDER — ROCURONIUM BROMIDE 10 MG/ML
INJECTION, SOLUTION INTRAVENOUS PRN
Status: DISCONTINUED | OUTPATIENT
Start: 2019-05-14 | End: 2019-05-14 | Stop reason: SDUPTHER

## 2019-05-14 RX ORDER — GLYCOPYRROLATE 1 MG/5 ML
SYRINGE (ML) INTRAVENOUS PRN
Status: DISCONTINUED | OUTPATIENT
Start: 2019-05-14 | End: 2019-05-14 | Stop reason: SDUPTHER

## 2019-05-14 RX ORDER — ONDANSETRON 2 MG/ML
4 INJECTION INTRAMUSCULAR; INTRAVENOUS EVERY 6 HOURS PRN
Status: DISCONTINUED | OUTPATIENT
Start: 2019-05-14 | End: 2019-05-18 | Stop reason: HOSPADM

## 2019-05-14 RX ORDER — LABETALOL 20 MG/4 ML (5 MG/ML) INTRAVENOUS SYRINGE
PRN
Status: DISCONTINUED | OUTPATIENT
Start: 2019-05-14 | End: 2019-05-14 | Stop reason: SDUPTHER

## 2019-05-14 RX ORDER — SUCCINYLCHOLINE/SOD CL,ISO/PF 200MG/10ML
SYRINGE (ML) INTRAVENOUS PRN
Status: DISCONTINUED | OUTPATIENT
Start: 2019-05-14 | End: 2019-05-14 | Stop reason: SDUPTHER

## 2019-05-14 RX ORDER — DEXAMETHASONE SODIUM PHOSPHATE 4 MG/ML
INJECTION, SOLUTION INTRA-ARTICULAR; INTRALESIONAL; INTRAMUSCULAR; INTRAVENOUS; SOFT TISSUE PRN
Status: DISCONTINUED | OUTPATIENT
Start: 2019-05-14 | End: 2019-05-14 | Stop reason: SDUPTHER

## 2019-05-14 RX ORDER — SODIUM CHLORIDE 9 MG/ML
INJECTION, SOLUTION INTRAVENOUS CONTINUOUS
Status: DISCONTINUED | OUTPATIENT
Start: 2019-05-14 | End: 2019-05-15

## 2019-05-14 RX ORDER — HYDRALAZINE HYDROCHLORIDE 20 MG/ML
10 INJECTION INTRAMUSCULAR; INTRAVENOUS EVERY 8 HOURS
Status: DISCONTINUED | OUTPATIENT
Start: 2019-05-14 | End: 2019-05-16

## 2019-05-14 RX ORDER — SODIUM CHLORIDE 9 MG/ML
INJECTION, SOLUTION INTRAVENOUS CONTINUOUS
Status: DISCONTINUED | OUTPATIENT
Start: 2019-05-14 | End: 2019-05-14

## 2019-05-14 RX ORDER — LIDOCAINE HYDROCHLORIDE 20 MG/ML
INJECTION, SOLUTION INTRAVENOUS PRN
Status: DISCONTINUED | OUTPATIENT
Start: 2019-05-14 | End: 2019-05-14 | Stop reason: SDUPTHER

## 2019-05-14 RX ORDER — SODIUM CHLORIDE, SODIUM LACTATE, POTASSIUM CHLORIDE, CALCIUM CHLORIDE 600; 310; 30; 20 MG/100ML; MG/100ML; MG/100ML; MG/100ML
INJECTION, SOLUTION INTRAVENOUS CONTINUOUS PRN
Status: DISCONTINUED | OUTPATIENT
Start: 2019-05-14 | End: 2019-05-14 | Stop reason: SDUPTHER

## 2019-05-14 RX ORDER — BUPIVACAINE HYDROCHLORIDE AND EPINEPHRINE 5; 5 MG/ML; UG/ML
INJECTION, SOLUTION EPIDURAL; INTRACAUDAL; PERINEURAL PRN
Status: DISCONTINUED | OUTPATIENT
Start: 2019-05-14 | End: 2019-05-14 | Stop reason: ALTCHOICE

## 2019-05-14 RX ORDER — HYDROMORPHONE HCL 110MG/55ML
PATIENT CONTROLLED ANALGESIA SYRINGE INTRAVENOUS PRN
Status: DISCONTINUED | OUTPATIENT
Start: 2019-05-14 | End: 2019-05-14 | Stop reason: SDUPTHER

## 2019-05-14 RX ORDER — FENTANYL CITRATE 50 UG/ML
INJECTION, SOLUTION INTRAMUSCULAR; INTRAVENOUS PRN
Status: DISCONTINUED | OUTPATIENT
Start: 2019-05-14 | End: 2019-05-14 | Stop reason: SDUPTHER

## 2019-05-14 RX ADMIN — PIPERACILLIN AND TAZOBACTAM 3.38 G: 3; .375 INJECTION, POWDER, FOR SOLUTION INTRAVENOUS at 01:49

## 2019-05-14 RX ADMIN — HYDRALAZINE HYDROCHLORIDE 10 MG: 20 INJECTION INTRAMUSCULAR; INTRAVENOUS at 20:02

## 2019-05-14 RX ADMIN — ROCURONIUM BROMIDE 20 MG: 10 INJECTION INTRAVENOUS at 10:55

## 2019-05-14 RX ADMIN — Medication 0.4 MG: at 11:39

## 2019-05-14 RX ADMIN — ROCURONIUM BROMIDE 10 MG: 10 INJECTION INTRAVENOUS at 10:46

## 2019-05-14 RX ADMIN — PIPERACILLIN AND TAZOBACTAM 3.38 G: 3; .375 INJECTION, POWDER, FOR SOLUTION INTRAVENOUS at 16:08

## 2019-05-14 RX ADMIN — Medication 3 MG: at 11:39

## 2019-05-14 RX ADMIN — PROPOFOL 100 MG: 10 INJECTION, EMULSION INTRAVENOUS at 10:46

## 2019-05-14 RX ADMIN — SODIUM CHLORIDE: 9 INJECTION, SOLUTION INTRAVENOUS at 05:58

## 2019-05-14 RX ADMIN — FENTANYL CITRATE 50 MCG: 50 INJECTION INTRAMUSCULAR; INTRAVENOUS at 10:54

## 2019-05-14 RX ADMIN — DEXAMETHASONE SODIUM PHOSPHATE 10 MG: 4 INJECTION, SOLUTION INTRAMUSCULAR; INTRAVENOUS at 10:51

## 2019-05-14 RX ADMIN — SODIUM CHLORIDE: 9 INJECTION, SOLUTION INTRAVENOUS at 16:11

## 2019-05-14 RX ADMIN — PIPERACILLIN AND TAZOBACTAM 3.38 G: 3; .375 INJECTION, POWDER, FOR SOLUTION INTRAVENOUS at 08:26

## 2019-05-14 RX ADMIN — SODIUM CHLORIDE: 9 INJECTION, SOLUTION INTRAVENOUS at 10:36

## 2019-05-14 RX ADMIN — METOPROLOL TARTRATE 2.5 MG: 5 INJECTION INTRAVENOUS at 09:30

## 2019-05-14 RX ADMIN — Medication 120 MG: at 10:46

## 2019-05-14 RX ADMIN — LABETALOL 20 MG/4 ML (5 MG/ML) INTRAVENOUS SYRINGE 5 MG: at 11:40

## 2019-05-14 RX ADMIN — METOPROLOL TARTRATE 2.5 MG: 5 INJECTION INTRAVENOUS at 16:11

## 2019-05-14 RX ADMIN — LABETALOL 20 MG/4 ML (5 MG/ML) INTRAVENOUS SYRINGE 5 MG: at 11:17

## 2019-05-14 RX ADMIN — METOPROLOL TARTRATE 2.5 MG: 5 INJECTION INTRAVENOUS at 22:09

## 2019-05-14 RX ADMIN — HYDROMORPHONE HYDROCHLORIDE 1 MG: 2 INJECTION INTRAMUSCULAR; INTRAVENOUS; SUBCUTANEOUS at 11:09

## 2019-05-14 RX ADMIN — SODIUM CHLORIDE, POTASSIUM CHLORIDE, SODIUM LACTATE AND CALCIUM CHLORIDE: 600; 310; 30; 20 INJECTION, SOLUTION INTRAVENOUS at 11:31

## 2019-05-14 RX ADMIN — LIDOCAINE HYDROCHLORIDE 50 MG: 20 INJECTION, SOLUTION INTRAVENOUS at 10:46

## 2019-05-14 RX ADMIN — HYDRALAZINE HYDROCHLORIDE 10 MG: 20 INJECTION INTRAMUSCULAR; INTRAVENOUS at 12:10

## 2019-05-14 ASSESSMENT — PULMONARY FUNCTION TESTS
PIF_VALUE: 24
PIF_VALUE: 20
PIF_VALUE: 20
PIF_VALUE: 12
PIF_VALUE: 23
PIF_VALUE: 22
PIF_VALUE: 18
PIF_VALUE: 18
PIF_VALUE: 25
PIF_VALUE: 24
PIF_VALUE: 23
PIF_VALUE: 0
PIF_VALUE: 19
PIF_VALUE: 1
PIF_VALUE: 25
PIF_VALUE: 24
PIF_VALUE: 23
PIF_VALUE: 23
PIF_VALUE: 16
PIF_VALUE: 19
PIF_VALUE: 17
PIF_VALUE: 12
PIF_VALUE: 19
PIF_VALUE: 16
PIF_VALUE: 23
PIF_VALUE: 22
PIF_VALUE: 21
PIF_VALUE: 19
PIF_VALUE: 0
PIF_VALUE: 22
PIF_VALUE: 1
PIF_VALUE: 21
PIF_VALUE: 17
PIF_VALUE: 18
PIF_VALUE: 24
PIF_VALUE: 0
PIF_VALUE: 23
PIF_VALUE: 19
PIF_VALUE: 21
PIF_VALUE: 26
PIF_VALUE: 17
PIF_VALUE: 1
PIF_VALUE: 8
PIF_VALUE: 21
PIF_VALUE: 19
PIF_VALUE: 22
PIF_VALUE: 22
PIF_VALUE: 1
PIF_VALUE: 3
PIF_VALUE: 22
PIF_VALUE: 20
PIF_VALUE: 21
PIF_VALUE: 21
PIF_VALUE: 22
PIF_VALUE: 23
PIF_VALUE: 18
PIF_VALUE: 18
PIF_VALUE: 23
PIF_VALUE: 11
PIF_VALUE: 22
PIF_VALUE: 24
PIF_VALUE: 22
PIF_VALUE: 18
PIF_VALUE: 18
PIF_VALUE: 22
PIF_VALUE: 18
PIF_VALUE: 23
PIF_VALUE: 25
PIF_VALUE: 15

## 2019-05-14 ASSESSMENT — PAIN SCALES - GENERAL
PAINLEVEL_OUTOF10: 0
PAINLEVEL_OUTOF10: 1
PAINLEVEL_OUTOF10: 0

## 2019-05-14 ASSESSMENT — ENCOUNTER SYMPTOMS
VOMITING: 0
EYE PAIN: 0
SHORTNESS OF BREATH: 0
SINUS PAIN: 0
BACK PAIN: 1
PHOTOPHOBIA: 0
ABDOMINAL PAIN: 1
BLOOD IN STOOL: 0
TROUBLE SWALLOWING: 0
CHEST TIGHTNESS: 0
COUGH: 0
NAUSEA: 1

## 2019-05-14 ASSESSMENT — PAIN DESCRIPTION - DESCRIPTORS
DESCRIPTORS: DISCOMFORT
DESCRIPTORS: DISCOMFORT;SORE

## 2019-05-14 ASSESSMENT — PAIN DESCRIPTION - LOCATION
LOCATION: ABDOMEN
LOCATION: ABDOMEN;CHEST

## 2019-05-14 ASSESSMENT — PAIN DESCRIPTION - ORIENTATION
ORIENTATION: MID
ORIENTATION: MID;UPPER

## 2019-05-14 ASSESSMENT — PAIN - FUNCTIONAL ASSESSMENT: PAIN_FUNCTIONAL_ASSESSMENT: ACTIVITIES ARE NOT PREVENTED

## 2019-05-14 ASSESSMENT — PAIN DESCRIPTION - PROGRESSION
CLINICAL_PROGRESSION: NOT CHANGED

## 2019-05-14 ASSESSMENT — PAIN DESCRIPTION - PAIN TYPE
TYPE: ACUTE PAIN
TYPE: SURGICAL PAIN

## 2019-05-14 ASSESSMENT — PAIN DESCRIPTION - DIRECTION: RADIATING_TOWARDS: LEFT SIDE

## 2019-05-14 ASSESSMENT — PAIN DESCRIPTION - FREQUENCY
FREQUENCY: INTERMITTENT
FREQUENCY: INTERMITTENT

## 2019-05-14 ASSESSMENT — PAIN DESCRIPTION - ONSET: ONSET: ON-GOING

## 2019-05-14 NOTE — ED TRIAGE NOTES
Pt presents to the ED for abdominal pain that started earlier today. When asked where she is hurting pt touches her entire abdomen. Pt denies chest pain and SOB. Pt states she has not felt well since 1600 today. EMS states upon their arrival pt's BP was elevated. Pt denies nausea.

## 2019-05-14 NOTE — ED PROVIDER NOTES
[facility]  eMERGENCY dEPARTMENT eNCOUnter          CHIEF COMPLAINT       Chief Complaint   Patient presents with    Abdominal Pain       Nurses Notes reviewed and I agree except as noted inthe HPI. HISTORY OF PRESENT ILLNESS    Fortunato Fitch is a 80 y.o. female who presents to the Emergency Department by EMS with family members at bedside. The patient has a history of hypertension and CKD. Patient has not felt well since around 1600 this afternoon. She reports fatigue and generalized weakness. Patient developed a sudden onset chest pain across the chest around that time which has since resolved. Patient states \"I thought I was having a heart attack\". This pain was non-radiating. It has since resolved. Patient denies nausea, vomiting, shortness of breath, or diaphoresis. Patient does not have a history of MI or blood clots. Patient describes this pain as aching. She reports generalized  abdominal pain as well. She also has concern in regards to her elevated BP reading. Her BP was in the 583'U systolic en route. Her BP at this time is 202/80. Patient is otherwise asymptomatic at this time. She is not in any acute distress. There are no other complaints or symptoms. The HPI was provided by the patient. REVIEW OF SYSTEMS     Review of Systems   Constitutional: Positive for fatigue. Negative for appetite change, chills and fever. HENT: Negative for congestion, ear pain, rhinorrhea and sore throat. Eyes: Negative for pain, discharge and visual disturbance. Respiratory: Negative for cough, shortness of breath and wheezing. Cardiovascular: Positive for chest pain. Negative for palpitations and leg swelling. Gastrointestinal: Negative for abdominal pain, diarrhea, nausea and vomiting. Genitourinary: Negative for difficulty urinating, dysuria, hematuria and vaginal discharge. Musculoskeletal: Negative for arthralgias, back pain, joint swelling and neck pain.    Skin: Negative medications found. Please discuss with provider. ALLERGIES     has No Known Allergies. FAMILY HISTORY     indicated that her mother is . She indicated that her father is . She indicated that her sister is . She indicated that her brother is . She indicated that one of her two daughters is . She indicated that her son is . family history includes Cancer in her brother, daughter, father, and son; Maryl Lose in her daughter; Heart Disease in her mother and sister. SOCIAL HISTORY      reports that she quit smoking about 36 years ago. Her smoking use included cigarettes. She has a 80.00 pack-year smoking history. She has never used smokeless tobacco. She reports that she does not drink alcohol or use drugs. PHYSICAL EXAM     INITIAL VITALS:  height is 5' 10\" (1.778 m) and weight is 168 lb (76.2 kg). Her oral temperature is 98.6 °F (37 °C). Her blood pressure is 181/76 (abnormal) and her pulse is 76. Her respiration is 16 and oxygen saturation is 92%. Physical Exam   Constitutional: She is oriented to person, place, and time. She appears well-developed and well-nourished. HENT:   Head: Normocephalic and atraumatic. Right Ear: External ear normal.   Left Ear: External ear normal.   Eyes: Conjunctivae are normal. Right eye exhibits no discharge. Left eye exhibits no discharge. No scleral icterus. Neck: Normal range of motion. Neck supple. No JVD present. Cardiovascular: Normal rate and regular rhythm. Pulmonary/Chest: Effort normal. No stridor. No respiratory distress. Abdominal: Soft. She exhibits no distension. There is generalized tenderness and tenderness in the right upper quadrant and epigastric area. Musculoskeletal: Normal range of motion. She exhibits no edema. Neurological: She is alert and oriented to person, place, and time. She exhibits normal muscle tone. GCS eye subscore is 4. GCS verbal subscore is 5.  GCS motor subscore of bowel obstruction. Punctate nonobstructing mid left renal calculus. **This report has been created using voice recognition software. It may contain minor errors which are inherent in voice recognition technology. **      Final report electronically signed by Dr. Dre Mensah on 5/14/2019 12:43 AM          LABS:   Labs Reviewed   CBC WITH AUTO DIFFERENTIAL - Abnormal; Notable for the following components:       Result Value    WBC 12.0 (*)     MCHC 31.7 (*)     RDW-CV 16.4 (*)     RDW-SD 56.0 (*)     Segs Absolute 10.1 (*)     Lymphocytes # 0.8 (*)     All other components within normal limits   BRAIN NATRIURETIC PEPTIDE - Abnormal; Notable for the following components:    Pro-BNP 2416.0 (*)     All other components within normal limits   COMPREHENSIVE METABOLIC PANEL - Abnormal; Notable for the following components:    Glucose 121 (*)     AST 76 (*)     Alkaline Phosphatase 166 (*)     All other components within normal limits   GLOMERULAR FILTRATION RATE, ESTIMATED - Abnormal; Notable for the following components:    Est, Glom Filt Rate 79 (*)     All other components within normal limits   URINE WITH REFLEXED MICRO - Abnormal; Notable for the following components:    Specific Gravity, Urine > 1.030 (*)     Nitrite, Urine POSITIVE (*)     Leukocyte Esterase, Urine MODERATE (*)     Color, UA DK YELLOW (*)     Character, Urine CLOUDY (*)     All other components within normal limits   URINE CULTURE, REFLEXED   TROPONIN   PROTIME-INR   MAGNESIUM   ANION GAP   OSMOLALITY   SURGICAL PATHOLOGY   CBC WITH AUTO DIFFERENTIAL   BASIC METABOLIC PANEL       EMERGENCY DEPARTMENT COURSE:   Vitals:    Vitals:    05/14/19 1430 05/14/19 1500 05/14/19 1745 05/14/19 1955   BP: (!) 145/66 135/70 (!) 167/77 (!) 181/76   Pulse: 64 67 79 76   Resp: 16 16 16 16   Temp:   97.5 °F (36.4 °C) 98.6 °F (37 °C)   TempSrc:   Oral Oral   SpO2: 91% 93% 92% 92%   Weight:       Height:         2036 Labs ordered    2043 Hydralazine that the patient be admitted under the care of Dr. Yari Wang, internal medicine specialist.     I discussed the case with Dr. Yari Wang, internal medicine specialist, who graciously agreed to accept the patient. PROCEDURES:  None    FINAL IMPRESSION      1. Acute cholecystitis          DISPOSITION/PLAN   Admitted    PATIENT REFERRED TO:  Veronica Brown MD  [address]  [phone]            DISCHARGE MEDICATIONS:  Current Discharge Medication List          (Please note that portions of this note were completed with a voice recognition program.Efforts were made to edit the dictations but occasionally words are mis-transcribed.)    Scribe:  Signed by: Erskin Canavan and Chyrel America, Scribes, 9/54/878:92 PM Scribing for and in the presence of Andrew Thomas MD    Provider:  I personally performed the services described in the documentation, reviewed and edited the documentation which was dictated to the scribe in mypresence, and it accurately records my words and actions.     Andrew Thomas MD 5/13/19 10:50 PM                      Andrew Thomas MD  05/14/19 2779

## 2019-05-14 NOTE — ED NOTES
Pt resting on cot. Pt and family updated on status of testing.      Ignacia Dandy, RN  05/14/19 2763

## 2019-05-14 NOTE — ED NOTES
Pt given pain medication per orders. Pt states she just does not feel well. Family at bedside.      Karen Jenkins RN  05/13/19 5657

## 2019-05-14 NOTE — CONSULTS
Zeyad Cruz MD     Pt Name: Raghavendra Otto  MRN: [id number]  YOB: 1931  Date of evaluation: 5/14/2019  Primary Care Physician: Adina Cadena MD  Patient evaluated at the request of  Dr. Tammy Saldana  Reason for evaluation: cholecystitis  IMPRESSIONS:   1. Acute calculus cholecystitis  2. Htn  PLANS:   1. Keep nothing by mouth  2. Hold Lovenox possible surgery later today. SCDs ordered for VTE prophylaxis  3. Continue Zosyn  4. Cholecystectomy later today if deemed medically stable to undergo procedure by patient's attending physician, Dr. Mani Galindo  5. Fluid resuscitation. Patient appears dehydrated. SUBJECTIVE:   Chief complaint: Abdominal pain    History of present illness: Patient presented to the emergency department yesterday evening. She complained of weakness son onset of upper abdominal discomfort with aching. If concerns was that it was chest pain. EKG showed no acute changes in her troponin was normal.  Abdominal examination exhibited some tenderness. She underwent CT scan and subsequent ultrasound of the gallbladder which revealed cholelithiasis with multiple calcified stones thickening of the gallbladder wall was some edema consistent with cholecystitis. She was admitted by Dr. Tammy Saldana who requested surgical consultation. Patient denies any prior history of hepatitis, pancreatitis or jaundice. I am not sure if she can give the most accurate history but answers most questions appropriately. She had bilateral mastectomies over 20 years ago. She has had no major abdominal surgeries per her report. She has no scars on her abdomen. On examination her abdomen is soft she is tender to palpation in the right upper quadrant without mass palpable.   PastMedical History:      Diagnosis Date    Acute gastritis 2013    Arthritis     Bilateral carotid artery disease ([address] 2012    Breast CA (HCC)     Chronic kidney disease     Colon, diverticulosis 2013    Hiatal hernia with gastroesophageal reflux 2013    Hypertension 2009    Insomnia     LBBB (left bundle branch block) 2004    Movement disorder     Osteoporosis 2009    Pneumonia      Past Surgical History:         Procedure Laterality Date    BACK SURGERY  1989    3 pinched nerves    BREAST SURGERY      CATARACT REMOVAL  2011    left, right    COLONOSCOPY  11/13    ENDOSCOPY, COLON, DIAGNOSTIC      EYE SURGERY      bilateral cataracts    HUMERUS FRACTURE SURGERY Right 7/20/2017    ORIF RIGHT HUMERUS WITH NAIL performed by Ever Cho MD at / [facility]      both knees and both hips    LAMINECTOMY  7/7/2012   [address]; [address] [facility]  right 1987; left 1991    NC OFFICE/OUTPT VISIT,PROCEDURE ONLY Left 1/20/2018    COLONOSCOPY performed by Rachael Clolier MD at [address] Left 7/9/2012    TONSILLECTOMY      TOTAL HIP ARTHROPLASTY  right 1994; left [id number]  27  [facility] ENDOSCOPY  2013     Medications:  Prior to Admission medications    Medication Sig Start Date End Date Taking?  Authorizing Provider   hydrALAZINE (APRESOLINE) 10 MG tablet take 1 tablet by mouth three times a day 2/27/19  Yes Veronica Brown MD   metoprolol tartrate (LOPRESSOR) 25 MG tablet take 1 tablet by mouth twice a day (ONE AT [address] [place] [address]) 2/19/19  Yes Veronica Brown MD   hydrALAZINE (APRESOLINE) 10 MG tablet Take 10 mg by mouth 3 times daily   Yes Historical Provider, MD   ferrous sulfate (FE TABS) 325 (65 Fe) MG EC tablet Take 1 tablet by mouth 2 times daily 1/29/18  Yes Veronica Brown MD   RA FIBER 0.52 g capsule  4/24/18   Historical Provider, MD    Scheduled Meds:   hydrALAZINE  10 mg Intravenous Q8H    metoprolol  2.5 mg Intravenous Q6H    piperacillin-tazobactam  3.375 g Intravenous Q8H    pantoprazole  40 mg Intravenous Daily    enoxaparin 40 mg Subcutaneous Daily     Continuous Infusions:   sodium chloride 75 mL/hr at 19 0558     PRN Meds:. HYDROmorphone, ondansetron  Allergies:  has No Known Allergies. Family History:  family history includes Cancer in her brother, daughter, father, and son; Marcell Sekou in her daughter; Heart Disease in her mother and sister. Social History:   reports that she quit smoking about 36 years ago. Her smoking use included cigarettes. She has a 80.00 pack-year smoking history. She has never used smokeless tobacco. She reports that she does not drink alcohol or use drugs. Review of Systems:  Review of Systems   Constitutional: Positive for appetite change. Negative for chills and fever. HENT: Negative for congestion, sinus pain and trouble swallowing. Edentulous   Eyes: Negative for photophobia and pain. Respiratory: Negative for cough, chest tightness and shortness of breath. Cardiovascular: Negative for chest pain and palpitations. Gastrointestinal: Positive for abdominal pain and nausea. Negative for blood in stool and vomiting. Endocrine: Negative for polydipsia and polyuria. Genitourinary: Negative for dysuria, flank pain and hematuria. Musculoskeletal: Positive for arthralgias and back pain. Negative for joint swelling and neck pain. Allergic/Immunologic: Negative for immunocompromised state. Neurological: Negative for dizziness and headaches. Hematological: Negative for adenopathy. Does not bruise/bleed easily. Psychiatric/Behavioral: Negative for agitation and hallucinations. The patient is not nervous/anxious. OBJECTIVE:   CURRENT VITALS:  height is 5' 10\" (1.778 m) and weight is 168 lb (76.2 kg). Her oral temperature is 98.5 °F (36.9 °C). Her blood pressure is 123/60 and her pulse is 66. Her respiration is 18 and oxygen saturation is 93%.    Temperature Range (24h):Temp: 98.5 °F (36.9 °C) Temp  Av.6 °F (37 °C)  Min: 97.7 °F (36.5 °C)  Max: 99.7 °F (37.6 °C)  BP ECJON(05Q): Systolic (96HME), VOU:007 , Min:123 , UYC:771     Diastolic (44IWB), MRY:47, Min:54, Max:84    Pulse Range (24h):Pulse  Av.9  Min: 66  Max: 95  Respiration Range (24h): Resp  Av.8  Min: 18  Max: 26  Current Pulse Ox (24h):  SpO2: 93 %  Pulse Ox Range (24h):  SpO2  Av.1 %  Min: 90 %  Max: 100 %  Oxygen Amount andDelivery:    Physical Exam   Constitutional: She is oriented to person, place, and time. She appears well-developed and well-nourished. No distress. HENT:   Head: Normocephalic and atraumatic. Mucous membranes are dry   Eyes: Pupils are equal, round, and reactive to light. EOM are normal. No scleral icterus. Neck: Normal range of motion. Neck supple. No JVD present. No tracheal deviation present. Cardiovascular: Normal rate and normal heart sounds. Pulmonary/Chest: Effort normal and breath sounds normal. No respiratory distress. She has no wheezes. Abdominal: Soft. Bowel sounds are normal. She exhibits no distension and no mass. There is tenderness. There is no rebound and no guarding. Positive Malone sign with tenderness to palpation over the gallbladder fossa   Musculoskeletal: Normal range of motion. She exhibits no edema. Lymphadenopathy:     She has no cervical adenopathy. Neurological: She is alert and oriented to person, place, and time. No cranial nerve deficit. Skin: Skin is warm and dry. No rash noted. She is not diaphoretic. No erythema. There is pallor. Psychiatric: She has a normal mood and affect. Thought content normal.   Vitals reviewed.         LABS:     Recent Labs     19   WBC 12.0*  --    HGB 13.9  --    HCT 43.8  --      --      --    K 3.5  --      --    CO2 26  --    BUN 17  --    CREATININE 0.7  --    MG 1.9  --    CALCIUM 9.6  --    INR  --  0.90   AST 76*  --    ALT 44  --    BILITOT 0.8  --      CBC with Differential:    Lab Results   Component Value Date    WBC 12.0 2019 RBC 4.64 05/13/2019    RBC 4.19 03/25/2012    HGB 13.9 05/13/2019    HCT 43.8 05/13/2019     05/13/2019    MCV 94.4 05/13/2019    MCH 30.0 05/13/2019    MCHC 31.7 05/13/2019    RDW 18.2 06/07/2018    NRBC 0 05/13/2019    NRBC 0 03/25/2012    SEGSPCT 84.2 05/13/2019    MONOPCT 6.9 05/13/2019    MONOSABS 0.8 05/13/2019    LYMPHSABS 0.8 05/13/2019    EOSABS 0.2 05/13/2019    BASOSABS 0.0 05/13/2019    DIFFTYPE see below 07/09/2012     CMP:    Lab Results   Component Value Date     05/13/2019    K 3.5 05/13/2019    K 3.4 01/19/2018     05/13/2019    CO2 26 05/13/2019    BUN 17 05/13/2019    CREATININE 0.7 05/13/2019    LABGLOM 79 05/13/2019    GLUCOSE 121 05/13/2019    GLUCOSE 97 09/30/2011    PROT 7.6 05/13/2019    LABALBU 4.2 05/13/2019    LABALBU 4.4 09/30/2011    CALCIUM 9.6 05/13/2019    BILITOT 0.8 05/13/2019    ALKPHOS 166 05/13/2019    AST 76 05/13/2019    ALT 44 05/13/2019     Troponin:    Lab Results   Component Value Date    TROPONINI <0.006 11/07/2013    TROPONINI <0.006 03/25/2012       RADIOLOGY:   I have personally reviewed the following films:     PROCEDURE: US GALLBLADDER RUQ       CLINICAL INFORMATION: CHOLECYSTITIS.       COMPARISON: Abdomen pelvis CT with contrast dated 5/13/2019       TECHNIQUE:Real-time transabdominal ultrasound was performed.       FINDINGS:       Pancreas: The pancreatic head is grossly normal. The body and tail were obscured by bowel gas. Liver: Normal echogenicity. No mass or intrahepatic biliary dilatation. Size: Upper normal, right lobe measures 16.5 cm   Gallbladder: Gallbladder is distended. There are layering gallstones in the gallbladder. There is significant gallbladder wall thickening with wall edema suggestive of acute cholecystitis. Sonographic Malone's Sign: Negative   CBD: CBD diameter is normal measuring 5.1 mm. Right Kidney: Limited imaging of the right kidney demonstrates no hydronephrosis.    Ascites: none           Impression     extensive colonic diverticulosis without diverticulitis. Small bowel and colon are otherwise unremarkable. There is no evidence of bowel wall thickening. No evidence of bowel obstruction.       Appendix: Unremarkable. No findings to suggest acute appendicitis.        Omentum and mesentery: Unremarkable       Aorta, vascular: No aortic aneurysm or dissection. There are aortoiliofemoral atherosclerotic calcifications.        Reproductive: Unremarkable       Bladder: Unremarkable. No wall thickening or obvious mass. No calcified stones.       Intraperitoneal/retroperitoneal Space: There is no ascites, abscess, adenopathy, or mass. No pneumoperitoneum.       Abdominal and pelvic body wall soft tissues: There is a tiny fat-containing umbilical hernia.       Musculoskeletal structures: There are postoperative changes of the thoracolumbar spine. There is an old moderate L1 compression fracture. There is mild levoscoliosis of the spine centered at the level of L1. Patient is status post bilateral total hip    arthroplasties. There is an old healed fracture of the right inferior pubic ramus.           Impression   Moderately distended gallbladder with possible layering gallstones and mild pericholecystic fluid. Correlate clinically regarding possible acute cholecystitis. Consider ultrasound for further evaluation. Extensive colonic diverticulosis without diverticulitis. No evidence of bowel obstruction. Punctate nonobstructing mid left renal calculus.       **This report has been created using voice recognition software. It may contain minor errors which are inherent in voice recognition technology. **       Final report electronically signed by Dr. Coral Rivers on 5/14/2019 12:43 AM           Electronically signed by Jacob Mckeon MD on 5/14/2019 at 6:21 AM
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
## 2019-05-14 NOTE — ED NOTES
Bed: 011A  Expected date: 5/13/19  Expected time: 8:04 PM  Means of arrival: [facility]  Comments:     Sarah Ramírez RN  05/13/19 2017

## 2019-05-14 NOTE — PROGRESS NOTES
Patient arrived to 5E69 from ED around 04:20AM. Patient incontinent of bladder and bowel. Full bed bath given, sheets and gown changed. Noted in admitting orders that patient had telemetry monitoring ordered. Called the Hub and called report to . Patient will be transferred to 5K-28 for telemetry monitoring.

## 2019-05-14 NOTE — BRIEF OP NOTE
Brief Postoperative Note  ______________________________________________________________    Patient: Brittany Valencia  YOB: 1931  MRN: [id number]  Date of Procedure: 5/14/2019    Pre-Op Diagnosis: calculous cholecystits    Post-Op Diagnosis: Same       Procedure(s):  ROBOTIC LAPAROSCOPICCHOLECYSTECTOMY    Anesthesia: Anesthesia type not filed in the log.     Surgeon(s):  Karl Jenkins MD    Assistant:     Estimated Blood Loss (mL): less than 50     Complications: None    Specimens:   ID Type Source Tests Collected by Time Destination   A : GALLBLADDER Tissue Gallbladder SURGICAL PATHOLOGY Karl Jenkins MD 5/14/2019 1107        Implants:  * No implants in log *      Drains: * No LDAs found *    Findings:     Karl Jenkins MD  Date: 5/14/2019  Time: 11:43 AM

## 2019-05-14 NOTE — CARE COORDINATION
19, 9:46 AM      Tyrone Cosme       Admitted from: ED 2017 Hospital day: 0   Location: -[facility] Reason for admit: Acute cholecystitis [K81.0]  Acute cholecystitis [K81.0] Status: IP  Admit order signed?: yes  PMH:  has a past medical history of Acute gastritis, Arthritis, Bilateral carotid artery disease ([address] Breast CA ([facility] Chronic kidney disease, Colon, diverticulosis, Hiatal hernia with gastroesophageal reflux, Hypertension, Insomnia, LBBB (left bundle branch block), Movement disorder, Osteoporosis, and Pneumonia. Procedure: Lap yeny planned  Pertinent abnormal Imagin/14 US gallbladder  Distended gallbladder with layering gallstones, significant gallbladder wall thickening and wall edema suggestive of acute cholecystitis. No biliary dilatation. Pancreatic body and tail obscured by bowel gas.  CT abdomen/pelvis  Moderately distended gallbladder with possible layering gallstones and mild pericholecystic fluid. Correlate clinically regarding possible acute cholecystitis. Consider ultrasound for further evaluation. Extensive colonic diverticulosis without diverticulitis. No evidence of bowel obstruction. Punctate nonobstructing mid left renal calculus. Medications:  Scheduled Meds:   hydrALAZINE  10 mg Intravenous Q8H    metoprolol  2.5 mg Intravenous Q6H    piperacillin-tazobactam  3.375 g Intravenous Q8H    pantoprazole  40 mg Intravenous Daily     Continuous Infusions:   sodium chloride 100 mL/hr at 19 0700      Pertinent Info/Orders/Treatment Plan:  Hospitalist following. Surgery consult. Planning lap yeny later today. WBC 12. IVF. Pain and nausea control. IV zosyn. Diet: Diet NPO Effective Now   Smoking status:  reports that she quit smoking about 36 years ago. Her smoking use included cigarettes. She has a 80.00 pack-year smoking history.  She has never used smokeless tobacco.   PCP: Angelica Wang MD  Readmission: no  Readmission Risk Score: 9%    Discharge Planning  Current Residence:  Private Residence  Living Arrangements:  Family Members   Support Systems:  Children, Family Members  Current Services PTA:     Potential Assistance Needed:  Other (Comment)  Potential Assistance Purchasing Medications:  No  Does patient want to participate in local refill/ meds to beds program?  No  Type of Home Care Services:  None  Patient expects to be discharged to:  private residence  Expected Discharge date:  05/16/19  Follow Up Appointment: Best Day/ Time: Monday PM    Discharge Plan: Spoke with patient and family, son and DIL live in area. She has another son and DIL on their way from [facility]. She lives home alone in a [place] house. Has one small step to enter home. Uses a walker. Discussed potential post-op needs. Patient is unsure what will be needed. Explained that CM will speak with her post-op to follow-up. Family reports that once patient is up after surgery she will probably appear to be walking poor but this is her baseline. States she \"looks akward when walking but does pretty good. \" DIL transports to [facility].       Evaluation: no

## 2019-05-14 NOTE — ED NOTES
Pt states \"I just don't feel good\".  Pt talking about the general abdomen area     Robinson Rapp RN  05/13/19 6014

## 2019-05-14 NOTE — PROGRESS NOTES
1152 Pt transferred to PACU, report received from [facility] [employer] and [address]. Pt briefly opens her eyes with repeated verbal command. Breathing is spontaneous unlabored, shallow. Pt is breathing primarily through her mouth, NC placed into mouth. See flow sheet for assessment. Ice packs placed over abdomen. 1210 Scheduled hydralazine given. Pt awakens easily when state her name. Pt has equal moderate strength HG. Pt not verbal but did nod her head she understood, surgery was over. Mouth was moistened with a swab. 1229 Report called to Daniel Louis RN. Pt remains sleepy, will awaken on her own and then doze back to sleep. 1235 Pt able to state her name/age. Pt aware surgery is over. Mouth moistened. After conversation, pt dozed back to sleep. 709 [facility] rounding and checked on pt. 1254 Pt awake and oriented to person/place/situation. Pt agrees to some abdominal discomfort, unable to give a specific number. Pt has been able to sleep and is calm when awake. 1256 Pt transferred to [place] [address], waiting room updated.

## 2019-05-14 NOTE — OP NOTE
[address] [place] [address]                                OPERATIVE REPORT    PATIENT NAME: Antwon Escobar                    :        10/03/1931  MED REC NO:   [id number]                           ROOM:       0028  ACCOUNT NO:   [de-identified]                           ADMIT DATE: 2019  PROVIDER:     Sherrell Flynn M.D.    DATE OF PROCEDURE:  2019    PREOPERATIVE DIAGNOSES:  1. Acute calculus cholecystitis. 2.  Urinary tract infection present on admission. POSTOPERATIVE DIAGNOSES:  1. Acute calculus cholecystitis. 2.  Urinary tract infection present on admission. PROCEDURE:  Robotic-assisted laparoscopic cholecystectomy. ANESTHESIA:  General.    ESTIMATED BLOOD LOSS:  Less than 20 mL. INDICATION:  See surgical consultation. The patient deemed stable to  undergo surgical intervention per medicine primary service. Risks of  the procedure were discussed. The patient and the family wished to  proceed. PROCEDURE:  The patient was brought the operating suite, placed supine  on the operating table. She had pneumatic sequential compression  devices on the lower extremities. She had Zosyn infusing when she was  came to the operating suite. After induction of general anesthetic via  endotracheal intubation, time-out was performed after the abdomen was  prepped and draped. Incision was made below the umbilicus. Fascia was  elevated. A small incision was made and the 12 mm Xi port with reducer  was placed. CO2 pneumoperitoneum was introduced. Two 8 mm ports were  placed through the mid axillary line at the level of the umbilicus under  direct visualization. A 5 mm system port was placed far out right  lateral.  With the patient in reverse Trendelenburg and turned toward  the left, the Xi robot was docked from the patient's right side. The  patient had been given ICG green dye.   Gallbladder was quite distended,  the wall was edematous. She had a floppy left lobe of the liver as  well. This was retracted out of the way. After the robot was docked,  instruments were inserted and I retired to the console. My assistant  grasped the gallbladder by its fundus and retracted over the liver edge. Some omental adhesions to the gallbladder and the infundibulum were  taken down. This exposed the fundus, it was retracted downward  laterally. Cystic duct was cleared from surrounding tissue, clipped  proximally and distally. After common duct was identified using Firefly  technology, artery was identified, clipped proximally and distally and  divided as well. The gallbladder was dissected off the liver bed using  cautery technique. There was edema, but her tissues were quite thin. There was some spillage of bile from the gallbladder as well as sludge. This was irrigated and suctioned from the right upper quadrant. There  was no purulence. There was no evidence of hydrops. Once the  gallbladder was completely dissected off the liver bed, I scrubbed back  into the patient's bedside. Instruments were removed and the robot was  undocked. The gallbladder was placed in an EndoCatch type bag and  removed from the abdominal cavity. The right upper quadrant was  reexamined. There was no evidence of bleeding from the liver bed. There was no evidence of bile leak. Ports were removed and the port  sites were infiltrated with 0.5% Marcaine with epinephrine. CO2  pneumoperitoneum was suctioned from the abdominal cavity. The  infraumbilical fascia was closed with interrupted 0 Ethibond suture. All skin incisions were closed with subcuticular 4-0 Vicryl followed by  application of skin glue. Sponge, sharp and instrument counts were  correct. The patient was transported to the recovery area.         Hodan Noel M.D.    D: 05/14/2019 11:50:48       T: 05/14/2019 11:56:01     [email or web address]  Job#: [id number]     Doc#: [id number]    CC:  CHANCE Waller M.D.        Mellisa Lopez M.D.

## 2019-05-14 NOTE — H&P
Dictated  Pt admitted for acute cholecystitis. Pain improved. 80 yr old with h/o Breast Ca s/o bilat mastectomy, LBBB not new. Pt able to perform her daily activities without any cardiac or pulm symptoms. Does have a systolic heart murmur in both A and M area. ECHO from 2017 reviewed. Mild AS and MR noted. Does have memory issues per family. BM every day. Last BM 2 days ago. Pt at acceptable risk if surgery is indicated. Cont with B Blockers and monitor post op. Pt understands her risk and wishes to continue. D/w KELLEE Davis and all questions answered .

## 2019-05-14 NOTE — ANESTHESIA POSTPROCEDURE EVALUATION
Department of Anesthesiology  Postprocedure Note    Patient: Aleisha Pulse  MRN: [id number]  YOB: 1931  Date of evaluation: 5/14/2019  Time:  1:01 PM     Procedure Summary     Date:  05/14/19 Room / Location:  [address] ANDRÉS Ramos    Anesthesia Start:  5547 Anesthesia Stop:  3084    Procedure:  ROBOTIC CHOLECYSTECTOMY (N/A Abdomen) Diagnosis:  (ABDOMINAL PAIN)    Surgeon:  Laith Montague MD Responsible Provider:  Gretta Ng MD    Anesthesia Type:  general ASA Status:  3          Anesthesia Type: No value filed. Raphael Phase I: Raphael Score: 6    Raphael Phase II:      Last vitals: Reviewed and per EMR flowsheets.        Anesthesia Post Evaluation    Patient location during evaluation: PACU  Patient participation: complete - patient participated  Level of consciousness: awake  Airway patency: patent  Nausea & Vomiting: no vomiting and no nausea  Complications: no  Cardiovascular status: hemodynamically stable  Respiratory status: acceptable  Hydration status: stable

## 2019-05-14 NOTE — H&P
[address] [place] [address]                              HISTORY AND PHYSICAL    PATIENT NAME: Lavonne Trujillo                    :        10/03/1931  MED REC NO:   [id number]                           ROOM:  ACCOUNT NO:   [de-identified]                           ADMIT DATE: 2019  PROVIDER:     Bal Tripp M.D.    HOSPITAL COURSE:  This is a pleasant 43-year-old woman. Most of the  history is obtained by discussing with the patient and later with  daughter-in-law and reviewing charts. The patient apparently was  playing cards with her son last evening when she felt she was getting  sick and she was assisted back to the chair where she laid there for a  while. As the day progressed, she did not feel comfortable. She  started noticing significant discomfort in her abdomen with pain that  was severe that she thought she might be having a heart attack. She was  not even able to get out of the chair and to reach for the phone and  finally pressed [facility] who brought her to the hospital.  Workup in  the ER, she did have a CT scan of the abdomen and also an ultrasound,  which was suggestive of possible acute cholecystitis. Alk phos was  slightly elevated. Her white count was 12,000. She has noticed her  pain to have resolved now. No nausea. So, she was consulted and plans  for surgery today if she was acceptable. The patient lying in bed. She  is able to perform her daily activities at home. Her son does check on  her. She does have memory issues and forgets to do so, but she is able  to do her daily activities. She does not have any stairs in the house. She could not tell if she can walk more than a block, but with daily  activity, she denies having any chest pain, palpitations, dizziness,  diaphoresis. She denies any increased swelling in her legs.   She had  injured her right leg, unsure how, but was seen by her family physician. She denies any blood in her stools. Her bowels do move regularly at  least once a day. Her last bowel movement was about two days back. PAST MEDICAL HISTORY:  Significant for history of breast cancer status  post bilateral mastectomy, history of hypertension. There is left  bundle branch block noted on her previous EKGs, which is 7% now, but she  denies having any history of coronary artery disease, congestive heart  failure, irregular heart beat or leaking heart valves. She did have a  heart murmur today, though. Also, she has had a history of chronic  kidney disease mentioned in her previous records along with carotid  disease, osteoporosis. She denies having any previous blood clots in  the legs or lungs. Denies having any asthma, emphysema, sleep apnea. No anxiety or depression. PAST SURGICAL HISTORY:  Had bilateral knee replacement, bilateral hip  replacement, three back surgeries, mastectomy, cataract surgery,  colonoscopy, shoulder surgery on the left, tonsillectomy. ALLERGIES:  No known drug allergies. HOME MEDICATIONS:  List that needs to be confirmed includes hydralazine,  Lopressor, ferrous sulfate, and fiber supplements. FAMILY HISTORY:  Significant for heart disease and cancer. Two of her  sons  from cancer. SOCIAL HISTORY:  She is , so she is . She was   three times, has two living children. She lost three children. She  denies any smoking alcohol or illicit drug use. REVIEW OF OTHER SYSTEMS:  Positive for abdominal discomfort, which is  now improved. Positive for nausea, but no emesis. No diarrhea. No  fever. No chills. No headache. No blurred vision. No chest pain. No  palpitations. No dizziness. No loss of consciousness. No unusual  bleeding. She could not tell if she had any weight loss. Per family,  she does have memory issues.     PHYSICAL EXAMINATION:  VITAL SIGNS:  Blood pressure 154/67, pulse of 67, mitral  regurgitation. Even though BNP is elevated, the patient clinically does  not appear to be in any heart failure. 2.  She does have chronic left bundle branch block without having  symptoms of any chest pain or difficulty breathing. After having an  extensive discussion with the patient and patient's daughter-in-law at  bedside, they understand that her risk is low-to-moderate and is at  acceptable risk if surgery is planned. We will continue with beta  blockers and monitor her rhythm postop. 3.  Continue with GI and DVT prophylaxis of surgeons choice. 4.  Early ambulation. 5.  As the patient is NPO, both her beta blockers and hydralazine will  be switched to IV until oral feeding resumed. 6.  We will check echo tomorrow.         Sridhar Whitmore M.D.    D: 05/14/2019 9:22:15       T: 05/14/2019 10:30:07     MARY [email or web address]  Job#: [id number]     Doc#: [id number]    CC:

## 2019-05-15 LAB
ANION GAP SERPL CALCULATED.3IONS-SCNC: 11 MEQ/L (ref 8–16)
BASOPHILS # BLD: 0.3 %
BASOPHILS ABSOLUTE: 0 THOU/MM3 (ref 0–0.1)
BUN BLDV-MCNC: 14 MG/DL (ref 7–22)
CALCIUM SERPL-MCNC: 8.8 MG/DL (ref 8.5–10.5)
CHLORIDE BLD-SCNC: 105 MEQ/L (ref 98–111)
CO2: 19 MEQ/L (ref 23–33)
CREAT SERPL-MCNC: 0.6 MG/DL (ref 0.4–1.2)
EOSINOPHIL # BLD: 0.1 %
EOSINOPHILS ABSOLUTE: 0 THOU/MM3 (ref 0–0.4)
ERYTHROCYTE [DISTWIDTH] IN BLOOD BY AUTOMATED COUNT: 16.8 % (ref 11.5–14.5)
ERYTHROCYTE [DISTWIDTH] IN BLOOD BY AUTOMATED COUNT: 55.8 FL (ref 35–45)
GFR SERPL CREATININE-BSD FRML MDRD: > 90 ML/MIN/1.73M2
GLUCOSE BLD-MCNC: 106 MG/DL (ref 70–108)
HCT VFR BLD CALC: 39.1 % (ref 37–47)
HEMOGLOBIN: 13.2 GM/DL (ref 12–16)
IMMATURE GRANS (ABS): 0.17 THOU/MM3 (ref 0–0.07)
IMMATURE GRANULOCYTES: 1.1 %
LV EF: 50 %
LVEF MODALITY: NORMAL
LYMPHOCYTES # BLD: 6.9 %
LYMPHOCYTES ABSOLUTE: 1 THOU/MM3 (ref 1–4.8)
MCH RBC QN AUTO: 30.7 PG (ref 26–33)
MCHC RBC AUTO-ENTMCNC: 33.8 GM/DL (ref 32.2–35.5)
MCV RBC AUTO: 90.9 FL (ref 81–99)
MONOCYTES # BLD: 8.6 %
MONOCYTES ABSOLUTE: 1.3 THOU/MM3 (ref 0.4–1.3)
NUCLEATED RED BLOOD CELLS: 0 /100 WBC
PLATELET # BLD: 123 THOU/MM3 (ref 130–400)
PMV BLD AUTO: 11 FL (ref 9.4–12.4)
POTASSIUM SERPL-SCNC: 4.3 MEQ/L (ref 3.5–5.2)
RBC # BLD: 4.3 MILL/MM3 (ref 4.2–5.4)
SEG NEUTROPHILS: 83 %
SEGMENTED NEUTROPHILS ABSOLUTE COUNT: 12.6 THOU/MM3 (ref 1.8–7.7)
SODIUM BLD-SCNC: 135 MEQ/L (ref 135–145)
WBC # BLD: 15.2 THOU/MM3 (ref 4.8–10.8)

## 2019-05-15 PROCEDURE — 6360000002 HC RX W HCPCS: Performed by: SURGERY

## 2019-05-15 PROCEDURE — 2500000003 HC RX 250 WO HCPCS: Performed by: SURGERY

## 2019-05-15 PROCEDURE — 85025 COMPLETE CBC W/AUTO DIFF WBC: CPT

## 2019-05-15 PROCEDURE — 51798 US URINE CAPACITY MEASURE: CPT

## 2019-05-15 PROCEDURE — 1200000003 HC TELEMETRY R&B

## 2019-05-15 PROCEDURE — 99024 POSTOP FOLLOW-UP VISIT: CPT | Performed by: SURGERY

## 2019-05-15 PROCEDURE — 97530 THERAPEUTIC ACTIVITIES: CPT

## 2019-05-15 PROCEDURE — 36415 COLL VENOUS BLD VENIPUNCTURE: CPT

## 2019-05-15 PROCEDURE — 97110 THERAPEUTIC EXERCISES: CPT

## 2019-05-15 PROCEDURE — C9113 INJ PANTOPRAZOLE SODIUM, VIA: HCPCS | Performed by: SURGERY

## 2019-05-15 PROCEDURE — 2580000003 HC RX 258: Performed by: SURGERY

## 2019-05-15 PROCEDURE — 80048 BASIC METABOLIC PNL TOTAL CA: CPT

## 2019-05-15 PROCEDURE — 2709999900 HC NON-CHARGEABLE SUPPLY

## 2019-05-15 PROCEDURE — 93306 TTE W/DOPPLER COMPLETE: CPT

## 2019-05-15 PROCEDURE — 97162 PT EVAL MOD COMPLEX 30 MIN: CPT

## 2019-05-15 RX ORDER — TRAZODONE HYDROCHLORIDE 50 MG/1
25 TABLET ORAL ONCE
Status: COMPLETED | OUTPATIENT
Start: 2019-05-15 | End: 2019-05-16

## 2019-05-15 RX ADMIN — PANTOPRAZOLE SODIUM 40 MG: 40 INJECTION, POWDER, FOR SOLUTION INTRAVENOUS at 05:16

## 2019-05-15 RX ADMIN — PIPERACILLIN AND TAZOBACTAM 3.38 G: 3; .375 INJECTION, POWDER, FOR SOLUTION INTRAVENOUS at 00:17

## 2019-05-15 RX ADMIN — PIPERACILLIN AND TAZOBACTAM 3.38 G: 3; .375 INJECTION, POWDER, FOR SOLUTION INTRAVENOUS at 08:56

## 2019-05-15 RX ADMIN — HYDROMORPHONE HYDROCHLORIDE 0.5 MG: 1 INJECTION, SOLUTION INTRAMUSCULAR; INTRAVENOUS; SUBCUTANEOUS at 05:12

## 2019-05-15 RX ADMIN — METOPROLOL TARTRATE 2.5 MG: 5 INJECTION INTRAVENOUS at 09:02

## 2019-05-15 RX ADMIN — METOPROLOL TARTRATE 2.5 MG: 5 INJECTION INTRAVENOUS at 05:12

## 2019-05-15 RX ADMIN — PIPERACILLIN AND TAZOBACTAM 3.38 G: 3; .375 INJECTION, POWDER, FOR SOLUTION INTRAVENOUS at 15:33

## 2019-05-15 RX ADMIN — METOPROLOL TARTRATE 2.5 MG: 5 INJECTION INTRAVENOUS at 15:33

## 2019-05-15 RX ADMIN — METOPROLOL TARTRATE 2.5 MG: 5 INJECTION INTRAVENOUS at 22:01

## 2019-05-15 RX ADMIN — HYDROMORPHONE HYDROCHLORIDE 0.5 MG: 1 INJECTION, SOLUTION INTRAMUSCULAR; INTRAVENOUS; SUBCUTANEOUS at 22:04

## 2019-05-15 RX ADMIN — HYDRALAZINE HYDROCHLORIDE 10 MG: 20 INJECTION INTRAMUSCULAR; INTRAVENOUS at 04:03

## 2019-05-15 RX ADMIN — HYDRALAZINE HYDROCHLORIDE 10 MG: 20 INJECTION INTRAMUSCULAR; INTRAVENOUS at 11:18

## 2019-05-15 RX ADMIN — ENOXAPARIN SODIUM 40 MG: 40 INJECTION SUBCUTANEOUS at 09:02

## 2019-05-15 RX ADMIN — HYDRALAZINE HYDROCHLORIDE 10 MG: 20 INJECTION INTRAMUSCULAR; INTRAVENOUS at 19:50

## 2019-05-15 ASSESSMENT — PAIN SCALES - GENERAL
PAINLEVEL_OUTOF10: 6
PAINLEVEL_OUTOF10: 10
PAINLEVEL_OUTOF10: 8
PAINLEVEL_OUTOF10: 1
PAINLEVEL_OUTOF10: 0

## 2019-05-15 ASSESSMENT — PAIN DESCRIPTION - PAIN TYPE: TYPE: SURGICAL PAIN

## 2019-05-15 ASSESSMENT — PAIN DESCRIPTION - DIRECTION: RADIATING_TOWARDS: LEFT SIDE

## 2019-05-15 ASSESSMENT — PAIN DESCRIPTION - LOCATION: LOCATION: ABDOMEN

## 2019-05-15 ASSESSMENT — PAIN DESCRIPTION - DESCRIPTORS: DESCRIPTORS: SORE

## 2019-05-15 ASSESSMENT — PAIN DESCRIPTION - FREQUENCY: FREQUENCY: INTERMITTENT

## 2019-05-15 ASSESSMENT — PAIN DESCRIPTION - ORIENTATION: ORIENTATION: MID;UPPER

## 2019-05-15 NOTE — PLAN OF CARE
Problem: Pain:  Goal: Pain level will decrease  Description  Pain level will decrease  Outcome: Ongoing  Note:   Patient denies pain with goal of 2/10. Patient satisfied with current interventions including rest and repositioning. Pain assessments ongoing. Problem: Falls - Risk of:  Goal: Will remain free from falls  Description  Will remain free from falls  Outcome: Ongoing  Note:   Patient free from falls this shift. Patient using call light appropriately. Call light in reach, fall sign posted, nonskid footwear on, hourly rounding, bed alarm on, bed rails up x2. Patient at risk for falls due to generalized weakness. Problem: Discharge Planning:  Goal: Discharged to appropriate level of care  Description  Discharged to appropriate level of care  Outcome: Ongoing  Note:   Discharge plans to possibly TCU discussed with patient and family. Problem: MOBILITY  Goal: Early mobilization is achieved  Outcome: Ongoing  Note:   Patient ambulating in room with one assist and walker. Care plan reviewed with patient. Patient verbalizes understanding of the plan of care and contributes to goal setting.

## 2019-05-15 NOTE — FLOWSHEET NOTE
[address] 60  OCCUPATIONAL THERAPY MISSED TREATMENT NOTE  [facility] MED 5K  5K-28/028-A      Date: 5/15/2019  Patient Name: Morgan Nielson        CSN: [id number]   : 10/3/1931  (80 y.o.)  Gender: female   Referring Practitioner: Dr. Jane vEans TREATMENT:  Patient refused treatment. Pt reports not feeling well this date and not wanting to get OOB, educated pt on role of OT in acute care.  Will check back later as time allows

## 2019-05-15 NOTE — CARE COORDINATION
DISCHARGE BARRIERS  5/15/19, 3:34 PM    Reason for Referral: SNF placement  Mental Status: alert and oriented-appears to be forgetful at times. Decision Making: patient is able to participate in decision Christie Montes is POA. Family/Social/Home Environment: Patient is an 80year old,  female. She resides alone in a 1 story house with a small step to enter. She reports that she is independent with her personal care needs. She also does her own cooking and housekeeping. Her son resides in the area and checks on her regularly. He will also transport her to her medical appointments. Patient admitted due to acute cholecystitis and had cholecystectomy on 5/14. Patient does not feel that she will be able to go home by herself and would like rehabs trudi. She is aware of TCU eval and prefers community ECF. Daughter and son in law from [facility] present and agreeable with plan. Current Services: none PTA  Current Equipment: walker with wheels. Payment Source: [employer] medicare  Concerns or Barriers to Discharge: none indicated  Collabrative List of ECF/HH were provided: yes  Teach Back Method used with patient and family regarding care   Patient and family verbalize understanding of the plan of care and contribute to goal setting. Anticipated Needs/Discharge Plan: planning ECF at discharge. SW to follow up with patient and family on 5/16 re: ECF preference.      Electronically signed by JOEL Alba on 5/15/2019 at 3:34 PM

## 2019-05-15 NOTE — PROGRESS NOTES
[address] [place] [address]  INPATIENT PHYSICAL THERAPY  EVALUATION  [facility] ONC MED 5K - 5K-28/028-A    Time In: 2357  Time Out: 7910  Timed Code Treatment Minutes: 45 Minutes  Minutes: 47          Date: 5/15/2019  Patient Name: Shane Andrade,  Gender:  female        MRN: [id number]  : 10/3/1931  (80 y.o.)      Referring Practitioner: Duong Almaguer MD  Diagnosis: Acute cholecystitis  Additional Pertinent Hx: Shane Andrade is a 80 y.o. female who presents to the Emergency Department by EMS with family members at bedside. The patient has a history of hypertension and CKD. Patient has not felt well since around 1600 this afternoon. She reports fatigue and generalized weakness. Patient developed a sudden onset chest pain across the chest around that time which has since resolved. Patient states \"I thought I was having a heart attack\". This pain was non-radiating. It has since resolved. Patient denies nausea, vomiting, shortness of breath, or diaphoresis. Patient does not have a history of MI or blood clots. Patient describes this pain as aching. She reports generalized  abdominal pain as well. She also has concern in regards to her elevated BP reading. Her BP was in the 739'G systolic en route. Her BP at this time is 202/80. Patient is otherwise asymptomatic at this time. She is not in any acute distress. There are no other complaints or symptoms. Pt is s/p lap yeny .      Past Medical History:   Diagnosis Date    Acute gastritis 2013    Arthritis     Bilateral carotid artery disease ([address] 2012    Breast CA ([address]     Chronic kidney disease     Colon, diverticulosis 2013    Hiatal hernia with gastroesophageal reflux 2013    Hypertension 2009    Insomnia     LBBB (left bundle branch block) 2004    Movement disorder     Osteoporosis 2009    Pneumonia      Past Surgical History:   Procedure Laterality Date    BACK SURGERY      3 pinched nerves    BREAST SURGERY      CATARACT REMOVAL      left, right    CHOLECYSTECTOMY, LAPAROSCOPIC N/A 5/14/2019    ROBOTIC CHOLECYSTECTOMY performed by Ivonne Sheffield MD at [address]  11/13    ENDOSCOPY, COLON, DIAGNOSTIC      EYE SURGERY      bilateral cataracts    HUMERUS FRACTURE SURGERY Right 7/20/2017    ORIF RIGHT HUMERUS WITH NAIL performed by Ca Clayton MD at [address]      both knees and both hips    LAMINECTOMY  7/7/2012   [address] [facility]  right 1987; left 1991    IN OFFICE/OUTPT VISIT,PROCEDURE ONLY Left 1/20/2018    COLONOSCOPY performed by Karla Chandler MD at [address] Left 7/9/2012    TONSILLECTOMY      TOTAL HIP ARTHROPLASTY  right 1994; left [id number]  27  left and right [address] ENDOSCOPY  2013       Restrictions/Precautions:  Fall Risk           Subjective:  Chart Reviewed: Yes  Patient assessed for rehabilitation services?: Yes  Family / Caregiver Present: Yes  Subjective: Pt is supine in bed, refuses PT initially. Education on the importance of mobility, pt agreeable to EOB mobility, then agreeable to getting up to the chair after encouragement from the doctor. RN states pt was confused through the night, pt is oriented but forgetful. General:  Follows Commands: Within Functional Limits    Vision: Impaired  Vision Exceptions: Wears glasses at all times    Hearing: Within functional limits         Pain:  Denies.           Social/Functional History:    Lives With: Alone  Type of Home: House  Home Layout: One level  Home Access: Stairs to enter without rails  Entrance Stairs - Number of Steps: 1  Home Equipment: Rolling walker               Ambulation Assistance: Independent  Transfer Assistance: Independent    Active : No     Additional Comments: Pt states amb with walker sometimes, goes without sometimes; son and daughter-in-law close and check in daily      Objective:       RLE AROM: [facility] LLE AROM : WFL        B LE's grossly 3+/5         RLE Tone: Normotonic  LLE Tone: Normotonic       Balance  Standing - Static: Fair  Standing - Dynamic: Fair, -  Comments: Pt stands at walker to bob brief, min A for balance with increased posterior sway    Supine to Sit: Minimal assistance(for trunk elevation, use of bed rail, increased time to complete)    Transfers  Sit to Stand: Minimal Assistance(from EOB, and from recliner, cues for hand placement)  Stand to sit: Contact guard assistance       Ambulation 1  Surface: level tile  Device: Rolling Walker  Assistance: Contact guard assistance, Minimal assistance  Quality of Gait: Pt slightly unsteady, decreased step length, assist for walker guidance, limited by fatigue. Distance: 3 feet to chair                   Exercises:  Comments: Pt performs supine B LE AROM: ankle pumps, heel slides and hip abd/add x 10 reps to increase strength for improved gait. Activity Tolerance:  Activity Tolerance: Patient limited by fatigue;Patient limited by endurance    Treatment Initiated: See above exercises, additional transfers, balance tasks, education. Assessment: Body structures, Functions, Activity limitations: Decreased functional mobility , Decreased cognition, Decreased endurance, Decreased strength, Decreased balance, Decreased safe awareness  Assessment: Pt tolerates session fair, limited by weakness, impaired endurance. Pt agreeable to mobility to chair today, is slightly unsteady when up. PT to continue to progress strength and functional mobility to return to PLOF. Prognosis: Good    Clinical Presentation: Moderate - Evolving with Changing Characteristics: Pt tolerates session fair, limited by weakness, impaired endurance. Pt agreeable to mobility to chair today, is slightly unsteady when up. PT to continue to progress strength and functional mobility to return to PLOF.     Decision Making: High Complexitybased on patient assessment and decision making process of determining plan of care and establishing reasonable expectations for measurable functional outcomes    REQUIRES PT FOLLOW UP: Yes    Discharge Recommendations:  Discharge Recommendations: Patient would benefit from continued therapy after discharge, Subacute/Skilled Nursing Facility    Patient Education:  Patient Education: POC, transfers, importance of mobility, recommendation for continued PT prior to DC home    Equipment Recommendations:  Equipment Needed: No    Safety:  Type of devices: All fall risk precautions in place, Call light within reach, Chair alarm in place, Left in chair    Plan:  Times per week: 3-5 X GM  Current Treatment Recommendations: Strengthening, Gait Training, Patient/Caregiver Education & Training, Stair training, Balance Training, Endurance Training, Functional Mobility Training, Transfer Training, Safety Education & Training    Goals:  Patient goals : to get stronger  Short term goals  Time Frame for Short term goals: by discharge  Short term goal 1: Pt to transfer supine <--> sit SBA to enable pt to get in/out of bed. Short term goal 2: Pt to transfer sit <--> stand SBA for increased functional mobility. Short term goal 3: Pt to ambulate >30 feet with RW SBA for household ambulation. Long term goals  Time Frame for Long term goals : NA due to short length of stay. Evaluation Complexity: Based on the findings of patient history, examination, clinical presentation, and decision making during this evaluation, the evaluation of Tyrone Riser  is of medium complexity.             AM-PAC Inpatient Mobility without Stair Climbing Raw Score : 13  AM-PAC Inpatient without Stair Climbing T-Scale Score : 38.96  Mobility Inpatient CMS 0-100% Score: 58.44  Mobility Inpatient without Stair CMS G-Code Modifier : CECILLE

## 2019-05-15 NOTE — FLOWSHEET NOTE
[address] 60  OCCUPATIONAL THERAPY MISSED TREATMENT NOTE  [facility] 5K  5K-28/028-A      Date: 5/15/2019  Patient Name: Binh Stone        CSN: [id number]   : 10/3/1931  (80 y.o.)  Gender: female   Referring Practitioner: Dr. Anibal Malcolm TREATMENT:  Missed Treat. OT attempted at this time, although pt declined all due to fatigue from just finishing PT session short time ago. Will check back .

## 2019-05-15 NOTE — CARE COORDINATION
5/15/19  2:18 PM    Benja Crandall yesterday per Dr. Christelle Spear. WBC's 15.2 today. Plan to advance diet if tolerating. PT/OT ordered but pt refused OT earlier due to  not feeling well. TCU consult placed.

## 2019-05-15 NOTE — PROGRESS NOTES
IM Progress Note  Dr. Jose Ramon Gonzalez  5/15/2019 1:58 PM      Patient name Brittany Valencia  [id number]  PCP: Gabriela Sarabia MD  Admit Date: 5/13/2019  Acct No. [de-identified]    Subjective: Interval History:   Lying in bed   now oriented but informed was confused intermittently      Diet: DIET GENERAL;    I/O last 3 completed shifts: In: 2035 [P.O.:300; I.V.:1735]  Out: 150 [Urine:100; Blood:50]  No intake/output data recorded. Admission weight: 187 lb (84.8 kg) as of 5/13/2019  8:17 PM  Wt Readings from Last 3 Encounters:   05/14/19 168 lb (76.2 kg)   05/06/19 180 lb 2 oz (81.7 kg)   11/05/18 174 lb 2 oz (79 kg)     Body mass index is 24.11 kg/m². ROS   CVS;  no cp or palpitation  Resp: no SOB or cough  Neuro:  No numbness or weakness or dizziness  Abd: no nausea or vomiting or abd pain      Medications:   Scheduled Meds:   enoxaparin  40 mg Subcutaneous Q24H    hydrALAZINE  10 mg Intravenous Q8H    metoprolol  2.5 mg Intravenous Q6H    piperacillin-tazobactam  3.375 g Intravenous Q8H    pantoprazole  40 mg Intravenous Daily     Continuous Infusions:   sodium chloride 75 mL/hr at 05/14/19 1611       Labs :     CBC:   Recent Labs     05/13/19  2035 05/15/19  0644   WBC 12.0* 15.2*   HGB 13.9 13.2    123*     BMP:    Recent Labs     05/13/19  2035 05/15/19  0644    135   K 3.5 4.3    105   CO2 26 19*   BUN 17 14   CREATININE 0.7 0.6   GLUCOSE 121* 106     Hepatic:   Recent Labs     05/13/19 2035   AST 76*   ALT 44   BILITOT 0.8   ALKPHOS 166*     Troponin: No results for input(s): TROPONINI in the last 72 hours. BNP: No results for input(s): BNP in the last 72 hours. Lipids: No results for input(s): CHOL, HDL in the last 72 hours.     Invalid input(s): LDLCALCU  INR:   Recent Labs     05/13/19 2059   INR 0.90       Radiology    Objective:   Vitals: BP (!) 170/75   Pulse 87   Temp 98.4 °F (36.9 °C) (Oral)   Resp 16   Ht 5' 10\" (1.778 m)   Wt 168 lb (76.2 kg)   SpO2 91% BMI 24.11 kg/m²   HEENT: Head:pupils react  Neck: supple  Lungs: clear to auscultation  Heart: regular rate and rhythm   Abdomen: soft BS are appreciated  NG NT  Extremities: warm  No edema  Neurologic:  Alert, oriented X3    Impression:   :   1.  Acute cholecystitis. s/p lap yeny  2. History of hypertension. 3.  Left bundle branch block, not new. 4.  Mild aortic stenosis and mitral regurgitation. 5.  History of breast cancer, status post bilateral mastectomy. 6.  Leukocytosis. 7.  Chronic kidney disease mentioned on her previous records. Her GFR was greater than 90.  8.  Elevated BNP, but clinically not in any significant failure. 9.  Memory issues. 10.  History of carotid stenosis mentioned with last ultrasound showing less than 50% stenosis.         Plan:    Cont to advance diet per surgery and stop IVF if tolerating diet  PT OT to continue   TCU lamar Cruz MD

## 2019-05-15 NOTE — PROCEDURES
A Bladder scan was performed at 0604 . The patient's last void was at patient has not voided . The residual amount was measured to be 268 ML. Report of results was given to [facility].

## 2019-05-16 LAB
ANION GAP SERPL CALCULATED.3IONS-SCNC: 13 MEQ/L (ref 8–16)
BASOPHILS # BLD: 0.2 %
BASOPHILS ABSOLUTE: 0 THOU/MM3 (ref 0–0.1)
BUN BLDV-MCNC: 11 MG/DL (ref 7–22)
CALCIUM SERPL-MCNC: 8.2 MG/DL (ref 8.5–10.5)
CHLORIDE BLD-SCNC: 102 MEQ/L (ref 98–111)
CO2: 20 MEQ/L (ref 23–33)
CREAT SERPL-MCNC: 0.5 MG/DL (ref 0.4–1.2)
EOSINOPHIL # BLD: 0.6 %
EOSINOPHILS ABSOLUTE: 0.1 THOU/MM3 (ref 0–0.4)
ERYTHROCYTE [DISTWIDTH] IN BLOOD BY AUTOMATED COUNT: 16.8 % (ref 11.5–14.5)
ERYTHROCYTE [DISTWIDTH] IN BLOOD BY AUTOMATED COUNT: 57.5 FL (ref 35–45)
GFR SERPL CREATININE-BSD FRML MDRD: > 90 ML/MIN/1.73M2
GLUCOSE BLD-MCNC: 109 MG/DL (ref 70–108)
HCT VFR BLD CALC: 36.5 % (ref 37–47)
HEMOGLOBIN: 12.1 GM/DL (ref 12–16)
IMMATURE GRANS (ABS): 0.12 THOU/MM3 (ref 0–0.07)
IMMATURE GRANULOCYTES: 1 %
LYMPHOCYTES # BLD: 8.9 %
LYMPHOCYTES ABSOLUTE: 1.1 THOU/MM3 (ref 1–4.8)
MCH RBC QN AUTO: 31.2 PG (ref 26–33)
MCHC RBC AUTO-ENTMCNC: 33.2 GM/DL (ref 32.2–35.5)
MCV RBC AUTO: 94.1 FL (ref 81–99)
MONOCYTES # BLD: 9.9 %
MONOCYTES ABSOLUTE: 1.2 THOU/MM3 (ref 0.4–1.3)
NUCLEATED RED BLOOD CELLS: 0 /100 WBC
ORGANISM: ABNORMAL
PLATELET # BLD: 129 THOU/MM3 (ref 130–400)
PMV BLD AUTO: 10.5 FL (ref 9.4–12.4)
POTASSIUM SERPL-SCNC: 3.5 MEQ/L (ref 3.5–5.2)
RBC # BLD: 3.88 MILL/MM3 (ref 4.2–5.4)
SEG NEUTROPHILS: 79.4 %
SEGMENTED NEUTROPHILS ABSOLUTE COUNT: 9.6 THOU/MM3 (ref 1.8–7.7)
SODIUM BLD-SCNC: 135 MEQ/L (ref 135–145)
URINE CULTURE REFLEX: ABNORMAL
WBC # BLD: 12.1 THOU/MM3 (ref 4.8–10.8)

## 2019-05-16 PROCEDURE — 6360000002 HC RX W HCPCS: Performed by: SURGERY

## 2019-05-16 PROCEDURE — 97530 THERAPEUTIC ACTIVITIES: CPT

## 2019-05-16 PROCEDURE — 2500000003 HC RX 250 WO HCPCS: Performed by: SURGERY

## 2019-05-16 PROCEDURE — 2709999900 HC NON-CHARGEABLE SUPPLY

## 2019-05-16 PROCEDURE — 97166 OT EVAL MOD COMPLEX 45 MIN: CPT

## 2019-05-16 PROCEDURE — 80048 BASIC METABOLIC PNL TOTAL CA: CPT

## 2019-05-16 PROCEDURE — 99024 POSTOP FOLLOW-UP VISIT: CPT | Performed by: SURGERY

## 2019-05-16 PROCEDURE — 6370000000 HC RX 637 (ALT 250 FOR IP): Performed by: INTERNAL MEDICINE

## 2019-05-16 PROCEDURE — 85025 COMPLETE CBC W/AUTO DIFF WBC: CPT

## 2019-05-16 PROCEDURE — 36415 COLL VENOUS BLD VENIPUNCTURE: CPT

## 2019-05-16 PROCEDURE — 1200000003 HC TELEMETRY R&B

## 2019-05-16 PROCEDURE — 2580000003 HC RX 258: Performed by: SURGERY

## 2019-05-16 PROCEDURE — 97110 THERAPEUTIC EXERCISES: CPT

## 2019-05-16 PROCEDURE — 97535 SELF CARE MNGMENT TRAINING: CPT

## 2019-05-16 PROCEDURE — C9113 INJ PANTOPRAZOLE SODIUM, VIA: HCPCS | Performed by: SURGERY

## 2019-05-16 RX ORDER — HYDRALAZINE HYDROCHLORIDE 25 MG/1
25 TABLET, FILM COATED ORAL 3 TIMES DAILY
Status: DISCONTINUED | OUTPATIENT
Start: 2019-05-16 | End: 2019-05-17

## 2019-05-16 RX ORDER — CIPROFLOXACIN 250 MG/1
250 TABLET, FILM COATED ORAL EVERY 12 HOURS SCHEDULED
Status: DISCONTINUED | OUTPATIENT
Start: 2019-05-16 | End: 2019-05-18 | Stop reason: HOSPADM

## 2019-05-16 RX ADMIN — ENOXAPARIN SODIUM 40 MG: 40 INJECTION SUBCUTANEOUS at 10:25

## 2019-05-16 RX ADMIN — METOPROLOL TARTRATE 2.5 MG: 5 INJECTION INTRAVENOUS at 10:23

## 2019-05-16 RX ADMIN — METOPROLOL TARTRATE 25 MG: 25 TABLET ORAL at 20:56

## 2019-05-16 RX ADMIN — HYDRALAZINE HYDROCHLORIDE 25 MG: 25 TABLET, FILM COATED ORAL at 20:56

## 2019-05-16 RX ADMIN — HYDRALAZINE HYDROCHLORIDE 10 MG: 20 INJECTION INTRAMUSCULAR; INTRAVENOUS at 10:25

## 2019-05-16 RX ADMIN — HYDRALAZINE HYDROCHLORIDE 25 MG: 25 TABLET, FILM COATED ORAL at 14:55

## 2019-05-16 RX ADMIN — PIPERACILLIN AND TAZOBACTAM 3.38 G: 3; .375 INJECTION, POWDER, FOR SOLUTION INTRAVENOUS at 10:23

## 2019-05-16 RX ADMIN — HYDROMORPHONE HYDROCHLORIDE 0.5 MG: 1 INJECTION, SOLUTION INTRAMUSCULAR; INTRAVENOUS; SUBCUTANEOUS at 03:14

## 2019-05-16 RX ADMIN — HYDROMORPHONE HYDROCHLORIDE 0.5 MG: 1 INJECTION, SOLUTION INTRAMUSCULAR; INTRAVENOUS; SUBCUTANEOUS at 14:53

## 2019-05-16 RX ADMIN — METOPROLOL TARTRATE 25 MG: 25 TABLET ORAL at 14:56

## 2019-05-16 RX ADMIN — HYDRALAZINE HYDROCHLORIDE 10 MG: 20 INJECTION INTRAMUSCULAR; INTRAVENOUS at 03:07

## 2019-05-16 RX ADMIN — PANTOPRAZOLE SODIUM 40 MG: 40 INJECTION, POWDER, FOR SOLUTION INTRAVENOUS at 05:39

## 2019-05-16 RX ADMIN — CIPROFLOXACIN 250 MG: 250 TABLET, FILM COATED ORAL at 20:56

## 2019-05-16 RX ADMIN — TRAZODONE HYDROCHLORIDE 25 MG: 50 TABLET ORAL at 20:56

## 2019-05-16 RX ADMIN — PIPERACILLIN AND TAZOBACTAM 3.38 G: 3; .375 INJECTION, POWDER, FOR SOLUTION INTRAVENOUS at 00:05

## 2019-05-16 ASSESSMENT — PAIN SCALES - GENERAL
PAINLEVEL_OUTOF10: 7
PAINLEVEL_OUTOF10: 7
PAINLEVEL_OUTOF10: 0
PAINLEVEL_OUTOF10: 5

## 2019-05-16 ASSESSMENT — PAIN DESCRIPTION - PAIN TYPE: TYPE: ACUTE PAIN

## 2019-05-16 ASSESSMENT — PAIN DESCRIPTION - LOCATION: LOCATION: GENERALIZED

## 2019-05-16 NOTE — PLAN OF CARE
Problem: DISCHARGE BARRIERS  Goal: Patient's continuum of care needs are met  Outcome: Ongoing  Note:   Planning ECF at discharge. Patient is a pre cert.

## 2019-05-16 NOTE — CARE COORDINATION
5/16/19, 11:01 AM    DISCHARGE BARRIERS    Spoke with family-they were at [facility] and were very pleased with facility and have reserved a bed for patient. They spoke with [place] at facility who advised them that SW will need to call with formal referral.     Call to Katia Steiner with [facility]-advised her of the above and referral information provided. SW also asked that they start pre cert once patient information has been reviewed.

## 2019-05-16 NOTE — PROGRESS NOTES
[facility]  INPATIENT PHYSICAL THERAPY  DAILY NOTE  [facility] ONC MED 5K - [id number]-28/[id number]    Time In: 6680  Time Out: 0901  Timed Code Treatment Minutes: 24 Minutes  Minutes: 24          Date: 2019  Patient Name: Cari Zavala,  Gender:  female        MRN: [id number]  : 10/3/1931  (80 y.o.)     Referring Practitioner: Fransisco Grubbs MD  Diagnosis: Acute cholecystitis  Additional Pertinent Hx: Cari Zavala is a 80 y.o. female who presents to the Emergency Department by EMS with family members at bedside. The patient has a history of hypertension and CKD. Patient has not felt well since around 1600 this afternoon. She reports fatigue and generalized weakness. Patient developed a sudden onset chest pain across the chest around that time which has since resolved. Patient states \"I thought I was having a heart attack\". This pain was non-radiating. It has since resolved. Patient denies nausea, vomiting, shortness of breath, or diaphoresis. Patient does not have a history of MI or blood clots. Patient describes this pain as aching. She reports generalized  abdominal pain as well. She also has concern in regards to her elevated BP reading. Her BP was in the 692'L systolic en route. Her BP at this time is 202/80. Patient is otherwise asymptomatic at this time. She is not in any acute distress. There are no other complaints or symptoms. Pt is s/p lap yeny .      Past Medical History:   Diagnosis Date    Acute gastritis 2013    Arthritis     Bilateral carotid artery disease ([address] 2012    Breast CA ([address]     Chronic kidney disease     Colon, diverticulosis 2013    Hiatal hernia with gastroesophageal reflux 2013    Hypertension 2009    Insomnia     LBBB (left bundle branch block) 2004    Movement disorder     Osteoporosis 2009    Pneumonia      Past Surgical History:   Procedure Laterality Date    BACK SURGERY      3 pinched nerves    BREAST SURGERY      CATARACT REMOVAL      left, right    CHOLECYSTECTOMY, LAPAROSCOPIC N/A 5/14/2019    ROBOTIC CHOLECYSTECTOMY performed by Dann Aguilera MD at [address]  11/13    ENDOSCOPY, COLON, DIAGNOSTIC      EYE SURGERY      bilateral cataracts    HUMERUS FRACTURE SURGERY Right 7/20/2017    ORIF RIGHT HUMERUS WITH NAIL performed by Diandra Lo MD at [address]      both knees and both hips    LAMINECTOMY  7/7/2012   [address]; [address] [facility]  right 1987; left 1991    [place] OFFICE/OUTPT VISIT,PROCEDURE ONLY Left 1/20/2018    COLONOSCOPY performed by Gabby Pandya MD at [address] Left 7/9/2012    TONSILLECTOMY      TOTAL HIP ARTHROPLASTY  right 1994; left [id number]  27  left and right [address] ENDOSCOPY  2013       Restrictions/Precautions:  Fall Risk, General Precautions        Other position/activity restrictions: pt incontinent of bowel and bladder       Prior Level of Function:  ADL Assistance: Independent  Homemaking Assistance: Independent(has help with lawncare)  Ambulation Assistance: Independent(with RW)  Transfer Assistance: Independent  Additional Comments: Pt reports walking with RW at all times vs. sometimes per PT eval.  Reports son stops by on Monday and Thursday vs. daily per PT eval.    Subjective:     Subjective: Patient laying in bed upon arrival. Patient refused PT initially, however with encouragement and education patient agreed to exercises and getting to chair for breakfast. Patient forgetful throughout session, also [place]. Pain:  Denies.           Social/Functional:  Lives With: Alone  Type of Home: House  Home Layout: One level  Home Access: Stairs to enter without rails  Entrance Stairs - Number of Steps: 1  Home Equipment: Rolling walker     Objective:  Supine to Sit: Contact guard assistance(head of bed elevated, frequent cues for proper technique with little carry over, cues to reach for rail)  Scooting: Contact guard assistance(to edge of bed)    Transfers  Sit to Stand: Contact guard assistance(from bed, cues for hand placement)  Stand to sit: Contact guard assistance       Ambulation 1  Surface: level tile  Device: Rolling Walker  Assistance: Contact guard assistance  Quality of Gait: slight unsteadiness noted, decreased step length, forward flexed posture, limited by fatigue  Distance: 3ft to chair      Exercises:  Exercises  Comments: Performed BLE exercises supine ankle pumps, heel slides, hip abd/add, straight leg raises, glute sets, seated marches, long arc quads x10 reps to increase strength. Activity Tolerance:  Activity Tolerance: Patient limited by fatigue;Patient limited by endurance; Patient limited by cognitive status    Assessment: Body structures, Functions, Activity limitations: Decreased functional mobility , Decreased cognition, Decreased endurance, Decreased strength, Decreased balance, Decreased safe awareness  Assessment: Patient tolerated session fairly well. Educated patient on importance of sitting up in chair for all meals. Patient requires encouragement for mobility. Patient would benefit from continued skilled physical therapy to improve fuctional mobility and return to OF. Prognosis: Good     REQUIRES PT FOLLOW UP: Yes    Discharge Recommendations:  Discharge Recommendations: Patient would benefit from continued therapy after discharge, Subacute/Skilled Nursing Facility    Patient Education:  Patient Education: bed mobility, transfers, gait, therex, up to chair for all meals    Equipment Recommendations:  Equipment Needed: No    Safety:  Type of devices:  All fall risk precautions in place, Call light within reach, Chair alarm in place, Left in chair, Gait belt    Plan:  Times per week: 3-5 X GM  Current Treatment Recommendations: Strengthening, Gait Training, Patient/Caregiver Education & Training, Stair training, Balance Training, Endurance Training,

## 2019-05-16 NOTE — PROGRESS NOTES
[facility] 60  INPATIENT OCCUPATIONAL THERAPY  [facility] ONC MED 5K  EVALUATION    Time:  Time In: 745  Time Out: 827  Timed Code Treatment Minutes: 28 Minutes  Minutes: 42          Date: 2019  Patient Name: Kareen Cooper,   Gender: female      MRN: [id number]  : 10/3/1931  (80 y.o.)  Referring Practitioner: Dr. Emeterio Alvarenga  Diagnosis: Acute cholecystitis  Additional Pertinent Hx: Patient presented to the emergency department yesterday evening. She complained of weakness son onset of upper abdominal discomfort with aching. If concerns was that it was chest pain. EKG showed no acute changes in her troponin was normal.  Abdominal examination exhibited some tenderness. She underwent CT scan and subsequent ultrasound of the gallbladder which revealed cholelithiasis with multiple calcified stones thickening of the gallbladder wall was some edema consistent with cholecystitis.    ROBOTIC LAPAROSCOPICCHOLECYSTECTOMY on     Restrictions/Precautions:  Fall Risk, General Precautions                    Other position/activity restrictions: pt incontinent of bowel and bladder       Past Medical History:   Diagnosis Date    Acute gastritis 2013    Arthritis     Bilateral carotid artery disease ([address] 2012    Breast CA ([address]     Chronic kidney disease     Colon, diverticulosis 2013    Hiatal hernia with gastroesophageal reflux 2013    Hypertension 2009    Insomnia     LBBB (left bundle branch block) 2004    Movement disorder     Osteoporosis 2009    Pneumonia      Past Surgical History:   Procedure Laterality Date    BACK SURGERY      3 pinched nerves    BREAST SURGERY      CATARACT REMOVAL      left, right    CHOLECYSTECTOMY, LAPAROSCOPIC N/A 2019    ROBOTIC CHOLECYSTECTOMY performed by Yari Leger MD at [place] [address]      ENDOSCOPY, COLON, DIAGNOSTIC      EYE SURGERY      bilateral cataracts    HUMERUS FRACTURE SURGERY Right 2017    ORIF RIGHT HUMERUS WITH NAIL performed by Hanny Valdivia MD at [address]      both knees and both hips    LAMINECTOMY  7/7/2012   [address] [facility]  [facility]; left [facility]    [place] OFFICE[place] VISIT,[place]EDURE ONLY Left 1/20/2018    COLONOSCOPY performed by Christine Roman MD at [address] Left 7/9/2012    TONSILLECTOMY      TOTAL HIP ARTHROPLASTY  right 1994; left [id number]  left and right [address] ENDOSCOPY  2013           Subjective  Chart Reviewed: Yes(full med chart)  Patient assessed for rehabilitation services?: Yes  Family / Caregiver Present: No    Subjective: \"I had a terrible night\" pt would not elaborate  Comments: Rn ok'd OT    General:  Overall Orientation Status: Within Functional Limits(however confusion noted)    Vision: Impaired  Vision Exceptions: Wears glasses at all times    Hearing: Exceptions to [facility]  Hearing Exceptions: Hard of hearing/hearing concerns         Pain:  Pain Assessment  Patient Currently in Pain: Denies       Social/Functional History:  Lives With: Alone  Type of Home: House  Home Layout: One level  Home Access: Stairs to enter without rails  Entrance Stairs - Number of Steps: 1  Home Equipment: Rolling walker     Bathroom Shower/Tub: Walk-in shower  Bathroom Toilet: Handicap height  Bathroom Equipment: Built-in shower seat, Grab bars in shower, Grab bars around toilet       ADL Assistance: Independent  Homemaking Assistance: Independent(has help with Claribel Fernandez)  Homemaking Responsibilities: Yes    Ambulation Assistance: Independent(with RW)  Transfer Assistance: Independent    Active : No  Leisure & Hobbies: plays cards and scrabble  Additional Comments: Pt reports walking with RW at all times vs. sometimes per PT eval.  Reports son stops by on Monday and Thursday vs. daily per PT eval.    Objective        Overall Cognitive Status: Exceptions  Arousal/Alertness: Delayed

## 2019-05-16 NOTE — PROGRESS NOTES
IM Progress Note  Dr. Alfreda Hernandez  5/16/2019 11:31 AM      Patient name Therese Kraft  [id number]  PCP: Florian Seip, MD  Admit Date: 5/13/2019  Acct No. [de-identified]    Subjective: Interval History:   Just had one large loose BM  Does not feel good today   Could not tell if she has more pain    Diet: DIET GENERAL;    I/O last 3 completed shifts: In: 6585 [P.O.:1300; I.V.:2431]  Out: 0   No intake/output data recorded. Admission weight: 187 lb (84.8 kg) as of 5/13/2019  8:17 PM  Wt Readings from Last 3 Encounters:   05/14/19 168 lb (76.2 kg)   05/06/19 180 lb 2 oz (81.7 kg)   11/05/18 174 lb 2 oz (79 kg)     Body mass index is 24.11 kg/m². ROS   CVS;  no cp or palpitation  Resp: no SOB or cough  Neuro:  No numbness or weakness or dizziness  Abd: no nausea or vomiting ?abd pain      Medications:   Scheduled Meds:   metoprolol tartrate  25 mg Oral BID    hydrALAZINE  25 mg Oral TID    ciprofloxacin  250 mg Oral 2 times per day    enoxaparin  40 mg Subcutaneous Q24H    traZODone  25 mg Oral Once     Continuous Infusions:      Labs :     CBC:   Recent Labs     05/13/19  2035 05/15/19  0644 05/16/19  0649   WBC 12.0* 15.2* 12.1*   HGB 13.9 13.2 12.1    123* 129*     BMP:    Recent Labs     05/13/19  2035 05/15/19  0644 05/16/19  0649    135 135   K 3.5 4.3 3.5    105 102   CO2 26 19* 20*   BUN 17 14 11   CREATININE 0.7 0.6 0.5   GLUCOSE 121* 106 109*     Hepatic:   Recent Labs     05/13/19 2035   AST 76*   ALT 44   BILITOT 0.8   ALKPHOS 166*     Troponin: No results for input(s): TROPONINI in the last 72 hours. BNP: No results for input(s): BNP in the last 72 hours. Lipids: No results for input(s): CHOL, HDL in the last 72 hours.     Invalid input(s): LDLCALCU  INR:   Recent Labs     05/13/19 2059   INR 0.90       Radiology    Objective:   Vitals: BP (!) 195/88   Pulse 79   Temp 98.7 °F (37.1 °C) (Oral)   Resp 18   Ht 5' 10\" (1.778 m)   Wt 168 lb (76.2 kg)   SpO2 94% BMI 24.11 kg/m²   HEENT: Head:pupils react  Neck: supple  Lungs: clear to auscultation  Heart: regular rate and rhythm   Abdomen: soft BS are appreciated  NG NT  Extremities: warm  No edema  Neurologic:  Alert, oriented X3    Impression:   :   1.  Acute cholecystitis. s/p lap yeny  2. History of hypertension. 3.  Left bundle branch block, not new. 4.  Mild aortic stenosis and mitral regurgitation. 5.  History of breast cancer, status post bilateral mastectomy. 6.  Leukocytosis. 7.  Chronic kidney disease mentioned on her previous records. Her GFR was greater than 90.  8.  Elevated BNP, but clinically not in any significant failure. 9.  Memory issues. 10.  History of carotid stenosis mentioned with last ultrasound showing less than 50% stenosis.   11 UTI + Kleb sen to cipro      Plan:    Change to oral meds  Switch antibiotics to cipro for her UTI  Cont PT OT  TCU eval awaited   Encourage oral fluids  If diarrhea persist then may need IVF and C diff to be checked    Janusz Reyes MD

## 2019-05-16 NOTE — FLOWSHEET NOTE
05/16/19 1830   Encounter Summary   Services provided to: Patient   Referral/Consult From: Other    Support System Children;Family members   Continue Visiting Yes  (5/16)   Complexity of Encounter Moderate   Length of Encounter 45 minutes   Spiritual Assessment Completed Yes   Advance Directives (For Healthcare)   Healthcare Directive Yes, patient has an advance directive for healthcare treatment   Type of Healthcare Directive Durable power of  for health care   Copy in Chart Yes, copy in chart   Chart Copy Status : Active;Current   Date Reviewed and Current: 05/16/19   Healthcare Agent Appointed Adult [address] [place] [address] Agent's Name [address] Agent's Phone Number [phone]   If you are unable to speak for yourself, does your Healthcare Agent or Legal Spokesperson know your healthcare wishes? Yes     Advance Directive Consult: Advance Directive education provided and forms were completed. Copies placed in chart and original returned to patient. Physician notified. Copy sent to Medical Records. Patient wants no intubation and no CPR.

## 2019-05-16 NOTE — PROGRESS NOTES
RADIOLOGY: No new    ASSESSMENT  1. POD # 2 laparoscopic cholecystectomy acute cholecystitis  2. Urinary tract infection present on admission. Klebsiella. PLAN  1. Diet as tolerated  2. VTE: lovenox  3. UTI per admitting service  4. Activity as tolerated. 5. Evaluation for TCU rehab ongoing. Continue diet as tolerated from surgical standpoint.     Milo Fernandez MD  Electronically signed 5/16/2019 at 7:58 AM

## 2019-05-17 LAB
ANION GAP SERPL CALCULATED.3IONS-SCNC: 11 MEQ/L (ref 8–16)
BASOPHILS # BLD: 0.3 %
BASOPHILS ABSOLUTE: 0 THOU/MM3 (ref 0–0.1)
BUN BLDV-MCNC: 11 MG/DL (ref 7–22)
CALCIUM SERPL-MCNC: 8.6 MG/DL (ref 8.5–10.5)
CHLORIDE BLD-SCNC: 105 MEQ/L (ref 98–111)
CO2: 22 MEQ/L (ref 23–33)
CREAT SERPL-MCNC: 0.6 MG/DL (ref 0.4–1.2)
EOSINOPHIL # BLD: 2.6 %
EOSINOPHILS ABSOLUTE: 0.2 THOU/MM3 (ref 0–0.4)
ERYTHROCYTE [DISTWIDTH] IN BLOOD BY AUTOMATED COUNT: 16.5 % (ref 11.5–14.5)
ERYTHROCYTE [DISTWIDTH] IN BLOOD BY AUTOMATED COUNT: 58.1 FL (ref 35–45)
GFR SERPL CREATININE-BSD FRML MDRD: > 90 ML/MIN/1.73M2
GLUCOSE BLD-MCNC: 99 MG/DL (ref 70–108)
HCT VFR BLD CALC: 38.3 % (ref 37–47)
HEMOGLOBIN: 12.1 GM/DL (ref 12–16)
IMMATURE GRANS (ABS): 0.06 THOU/MM3 (ref 0–0.07)
IMMATURE GRANULOCYTES: 0.7 %
LYMPHOCYTES # BLD: 11 %
LYMPHOCYTES ABSOLUTE: 1 THOU/MM3 (ref 1–4.8)
MCH RBC QN AUTO: 30.3 PG (ref 26–33)
MCHC RBC AUTO-ENTMCNC: 31.6 GM/DL (ref 32.2–35.5)
MCV RBC AUTO: 96 FL (ref 81–99)
MONOCYTES # BLD: 11.2 %
MONOCYTES ABSOLUTE: 1 THOU/MM3 (ref 0.4–1.3)
NUCLEATED RED BLOOD CELLS: 0 /100 WBC
PLATELET # BLD: 174 THOU/MM3 (ref 130–400)
PMV BLD AUTO: 10.6 FL (ref 9.4–12.4)
POTASSIUM SERPL-SCNC: 3.9 MEQ/L (ref 3.5–5.2)
RBC # BLD: 3.99 MILL/MM3 (ref 4.2–5.4)
SEG NEUTROPHILS: 74.2 %
SEGMENTED NEUTROPHILS ABSOLUTE COUNT: 6.8 THOU/MM3 (ref 1.8–7.7)
SODIUM BLD-SCNC: 138 MEQ/L (ref 135–145)
WBC # BLD: 9.1 THOU/MM3 (ref 4.8–10.8)

## 2019-05-17 PROCEDURE — 1200000003 HC TELEMETRY R&B

## 2019-05-17 PROCEDURE — 2709999900 HC NON-CHARGEABLE SUPPLY

## 2019-05-17 PROCEDURE — 80048 BASIC METABOLIC PNL TOTAL CA: CPT

## 2019-05-17 PROCEDURE — 6360000002 HC RX W HCPCS: Performed by: INTERNAL MEDICINE

## 2019-05-17 PROCEDURE — 99024 POSTOP FOLLOW-UP VISIT: CPT | Performed by: SURGERY

## 2019-05-17 PROCEDURE — 85025 COMPLETE CBC W/AUTO DIFF WBC: CPT

## 2019-05-17 PROCEDURE — 6370000000 HC RX 637 (ALT 250 FOR IP): Performed by: INTERNAL MEDICINE

## 2019-05-17 PROCEDURE — 36415 COLL VENOUS BLD VENIPUNCTURE: CPT

## 2019-05-17 PROCEDURE — 97110 THERAPEUTIC EXERCISES: CPT

## 2019-05-17 PROCEDURE — 6360000002 HC RX W HCPCS: Performed by: SURGERY

## 2019-05-17 RX ORDER — TRAMADOL HYDROCHLORIDE 50 MG/1
50 TABLET ORAL EVERY 6 HOURS PRN
Status: DISCONTINUED | OUTPATIENT
Start: 2019-05-17 | End: 2019-05-18 | Stop reason: HOSPADM

## 2019-05-17 RX ORDER — HYDRALAZINE HYDROCHLORIDE 50 MG/1
50 TABLET, FILM COATED ORAL 3 TIMES DAILY
Status: DISCONTINUED | OUTPATIENT
Start: 2019-05-17 | End: 2019-05-18 | Stop reason: HOSPADM

## 2019-05-17 RX ORDER — TRAMADOL HYDROCHLORIDE 50 MG/1
50 TABLET ORAL EVERY 6 HOURS PRN
Qty: 12 TABLET | Refills: 0 | Status: SHIPPED | OUTPATIENT
Start: 2019-05-17 | End: 2019-05-17

## 2019-05-17 RX ORDER — HYDRALAZINE HYDROCHLORIDE 25 MG/1
25 TABLET, FILM COATED ORAL 3 TIMES DAILY
Qty: 90 TABLET | Refills: 3 | DISCHARGE
Start: 2019-05-17 | End: 2019-05-18 | Stop reason: HOSPADM

## 2019-05-17 RX ORDER — ACETAMINOPHEN 500 MG
1000 TABLET ORAL EVERY 6 HOURS PRN
Qty: 120 TABLET | Refills: 3 | Status: ON HOLD | DISCHARGE
Start: 2019-05-17 | End: 2021-01-01 | Stop reason: HOSPADM

## 2019-05-17 RX ORDER — TRAMADOL HYDROCHLORIDE 50 MG/1
50 TABLET ORAL EVERY 6 HOURS PRN
Qty: 12 TABLET | Refills: 0 | Status: SHIPPED | OUTPATIENT
Start: 2019-05-17 | End: 2019-05-20

## 2019-05-17 RX ORDER — CIPROFLOXACIN 250 MG/1
250 TABLET, FILM COATED ORAL EVERY 12 HOURS SCHEDULED
Qty: 10 TABLET | Refills: 0 | DISCHARGE
Start: 2019-05-17 | End: 2019-05-22

## 2019-05-17 RX ORDER — HYDRALAZINE HYDROCHLORIDE 20 MG/ML
10 INJECTION INTRAMUSCULAR; INTRAVENOUS ONCE
Status: COMPLETED | OUTPATIENT
Start: 2019-05-17 | End: 2019-05-17

## 2019-05-17 RX ADMIN — ENOXAPARIN SODIUM 40 MG: 40 INJECTION SUBCUTANEOUS at 09:47

## 2019-05-17 RX ADMIN — METOPROLOL TARTRATE 25 MG: 25 TABLET ORAL at 09:22

## 2019-05-17 RX ADMIN — HYDRALAZINE HYDROCHLORIDE 25 MG: 25 TABLET, FILM COATED ORAL at 12:23

## 2019-05-17 RX ADMIN — TRAMADOL HYDROCHLORIDE 50 MG: 50 TABLET, FILM COATED ORAL at 12:23

## 2019-05-17 RX ADMIN — HYDRALAZINE HYDROCHLORIDE 25 MG: 25 TABLET, FILM COATED ORAL at 09:22

## 2019-05-17 RX ADMIN — METOPROLOL TARTRATE 25 MG: 25 TABLET ORAL at 19:43

## 2019-05-17 RX ADMIN — CIPROFLOXACIN 250 MG: 250 TABLET, FILM COATED ORAL at 09:22

## 2019-05-17 RX ADMIN — HYDROMORPHONE HYDROCHLORIDE 0.5 MG: 1 INJECTION, SOLUTION INTRAMUSCULAR; INTRAVENOUS; SUBCUTANEOUS at 03:25

## 2019-05-17 RX ADMIN — CIPROFLOXACIN 250 MG: 250 TABLET, FILM COATED ORAL at 19:43

## 2019-05-17 RX ADMIN — HYDRALAZINE HYDROCHLORIDE 10 MG: 20 INJECTION INTRAMUSCULAR; INTRAVENOUS at 15:10

## 2019-05-17 RX ADMIN — ONDANSETRON 4 MG: 2 INJECTION INTRAMUSCULAR; INTRAVENOUS at 18:54

## 2019-05-17 RX ADMIN — HYDRALAZINE HYDROCHLORIDE 50 MG: 50 TABLET, FILM COATED ORAL at 21:24

## 2019-05-17 RX ADMIN — TRAMADOL HYDROCHLORIDE 50 MG: 50 TABLET, FILM COATED ORAL at 21:24

## 2019-05-17 ASSESSMENT — PAIN SCALES - GENERAL
PAINLEVEL_OUTOF10: 0
PAINLEVEL_OUTOF10: 6
PAINLEVEL_OUTOF10: 7
PAINLEVEL_OUTOF10: 4
PAINLEVEL_OUTOF10: 7

## 2019-05-17 ASSESSMENT — PAIN DESCRIPTION - LOCATION: LOCATION: GENERALIZED

## 2019-05-17 ASSESSMENT — PAIN DESCRIPTION - PAIN TYPE: TYPE: ACUTE PAIN
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
## 2019-05-17 NOTE — PROGRESS NOTES
IM Progress Note  Dr. Kyree Castillo  5/17/2019 11:56 AM      Patient name Tyrone Cosme  [id number]  PCP: Angelica Wang MD  Admit Date: 5/13/2019  Acct No. [de-identified]    Subjective: Interval History:   No diarrhea  Doing well    Diet: DIET GENERAL;    I/O last 3 completed shifts: In: 7894 [P.O.:1140; I.V.:429]  Out: 525 [Urine:525]  No intake/output data recorded. Admission weight: 187 lb (84.8 kg) as of 5/13/2019  8:17 PM  Wt Readings from Last 3 Encounters:   05/14/19 168 lb (76.2 kg)   05/06/19 180 lb 2 oz (81.7 kg)   11/05/18 174 lb 2 oz (79 kg)     Body mass index is 24.11 kg/m². ROS   CVS;  no cp or palpitation  Resp: no SOB or cough  Neuro:  No numbness or weakness or dizziness  Abd: no nausea or vomiting ?abd pain      Medications:   Scheduled Meds:   metoprolol tartrate  25 mg Oral BID    hydrALAZINE  25 mg Oral TID    ciprofloxacin  250 mg Oral 2 times per day    enoxaparin  40 mg Subcutaneous Q24H     Continuous Infusions:      Labs :     CBC:   Recent Labs     05/15/19  0644 05/16/19  0649 05/17/19  0704   WBC 15.2* 12.1* 9.1   HGB 13.2 12.1 12.1   * 129* 174     BMP:    Recent Labs     05/15/19  0644 05/16/19  0649 05/17/19  0704    135 138   K 4.3 3.5 3.9    102 105   CO2 19* 20* 22*   BUN 14 11 11   CREATININE 0.6 0.5 0.6   GLUCOSE 106 109* 99     Hepatic:   No results for input(s): AST, ALT, ALB, BILITOT, ALKPHOS in the last 72 hours. Troponin: No results for input(s): TROPONINI in the last 72 hours. BNP: No results for input(s): BNP in the last 72 hours. Lipids: No results for input(s): CHOL, HDL in the last 72 hours. Invalid input(s): LDLCALCU  INR:   No results for input(s): INR in the last 72 hours.     Radiology    Objective:   Vitals: BP (!) 190/97   Pulse 81   Temp 98.2 °F (36.8 °C) (Oral)   Resp 18   Ht 5' 10\" (1.778 m)   Wt 168 lb (76.2 kg)   SpO2 95%   BMI 24.11 kg/m²   HEENT: Head:pupils react  Neck: supple  Lungs: clear to auscultation  Heart: regular rate and rhythm   Abdomen: soft BS are appreciated  NG NT  Extremities: warm  No edema  Neurologic:  Alert, oriented X3    Impression:   :   1.  Acute cholecystitis. s/p lap yeny  2. History of hypertension. 3.  Left bundle branch block, not new. 4.  Mild aortic stenosis and mitral regurgitation. 5.  History of breast cancer, status post bilateral mastectomy. 6.  Leukocytosis. 7.  Chronic kidney disease mentioned on her previous records. Her GFR was greater than 90.  8.  Elevated BNP, but clinically not in any significant failure. 9.  Memory issues. 10.  History of carotid stenosis mentioned with last ultrasound showing less than 50% stenosis.   11 UTI + Kleb sen to cipro      Plan:    Discharge when pre cert done  Earlis Cranker, MD

## 2019-05-17 NOTE — CARE COORDINATION
Discharge orders on chart. Met with Carol Kumar and her son present at bedside. Both are in agreement for discharge to [facility] today, Medicare Rights updated. Carol Kumar will leave when her transportation via [employer] arrives.  Electronically signed by Braden Molina RN on 5/17/19 at 2:18 PM

## 2019-05-17 NOTE — CARE COORDINATION
5/17/19, 1:40 PM    DISCHARGE BARRIERS    Received call from Pat with [facility]-precert is back and they can accept patient today. Primary RN Evens Warren updated. Spoke with patient and son Jolyn Krabbe and advised them of the above. Directions left for staff for completion of discharge. Patient has been approved for skilled care by her [address] Pre cert is good through Saturday per Pat with [facility].     5/17/19, 3:06 PM    Discharge plan discussed by  and . Discharge plan reviewed with patient/ family. Patient/ family verbalize understanding of discharge plan and are in agreement with plan. Understanding was demonstrated using the teach back method.    Services After Discharge  Services At/After Discharge: Skilled Therapy, Nursing Services, Transport, In ambulance(Marla Boyer/JOLLY)   IMM Letter  IMM Letter given to Patient/Family/Significant other/Guardian/POA/by[de-identified] updated  IMM Letter date given[de-identified] 05/17/19  IMM Letter time given[de-identified] [phone]

## 2019-05-17 NOTE — PROGRESS NOTES
Michelle Ferguson 60  INPATIENT OCCUPATIONAL THERAPY  [facility]K  DAILY NOTE    Time:  Time In: 1384  Time Out: 1506  Timed Code Treatment Minutes: 23 Minutes  Minutes: 23    Date: 2019  Patient Name: Leyda Pineda,   Gender: female      Room: [facility]/028-A  MRN: [id number]  : 10/3/1931  (80 y.o.)  Referring Practitioner: Dr. Mellissa Riley  Diagnosis: Acute cholecystitis  Additional Pertinent Hx: Patient presented to the emergency department yesterday evening. She complained of weakness son onset of upper abdominal discomfort with aching. If concerns was that it was chest pain. EKG showed no acute changes in her troponin was normal.  Abdominal examination exhibited some tenderness. She underwent CT scan and subsequent ultrasound of the gallbladder which revealed cholelithiasis with multiple calcified stones thickening of the gallbladder wall was some edema consistent with cholecystitis.    ROBOTIC LAPAROSCOPICCHOLECYSTECTOMY on     Past Medical History:   Diagnosis Date    Acute gastritis     Arthritis     Bilateral carotid artery disease ([address] 2012    Breast CA ([address]     Chronic kidney disease     Colon, diverticulosis 2013    Hiatal hernia with gastroesophageal reflux 2013    Hypertension 2009    Insomnia     LBBB (left bundle branch block) 2004    Movement disorder     Osteoporosis 2009    Pneumonia      Past Surgical History:   Procedure Laterality Date    BACK SURGERY      3 pinched nerves    BREAST SURGERY      CATARACT REMOVAL      left, right    CHOLECYSTECTOMY, LAPAROSCOPIC N/A 2019    ROBOTIC CHOLECYSTECTOMY performed by Dann Aguilera MD at [facility]      ENDOSCOPY, COLON, DIAGNOSTIC      EYE SURGERY      bilateral cataracts    HUMERUS FRACTURE SURGERY Right 2017    ORIF RIGHT HUMERUS WITH NAIL performed by Diandra Lo MD at [facility]      both knees and both hips    LAMINECTOMY  2012   Joanne  ARTHROPLASTY  1989; [facility]  right 1987; left 1991    RI OFFICE/OUTPT VISIT,PROCEDURE ONLY Left 1/20/2018    COLONOSCOPY performed by Jocelyn Hay MD at [address] Left 7/9/2012    TONSILLECTOMY      TOTAL HIP ARTHROPLASTY  right 1994; left 1996    TOTAL KNEE ARTHROPLASTY  left and right [facility] ENDOSCOPY  2013       Restrictions/Precautions:  Fall Risk, General Precautions     Other position/activity restrictions: pt incontinent of bowel and bladder    Prior Level of Function:  ADL Assistance: Independent  Homemaking Assistance: Independent(has help with lawncare)  Ambulation Assistance: Independent(with RW)  Transfer Assistance: Independent  Additional Comments: Pt reports walking with RW at all times vs. sometimes per PT eval.  Reports son stops by on Monday and Thursday vs. daily per PT eval.    Subjective  Subjective: Pt seated upright in bed upon arrival, agreeable to OT session. Overall Orientation Status: Within Functional Limits    Pain:  Pain Assessment  Patient Currently in Pain: Denies    Objective  Overall Cognitive Status: Exceptions  Following Commands: Follows one step commands with increased time  Insights: Decreased awareness of deficits  Initiation: Requires cues for some  Sequencing: Requires cues for some    Type of ROM/Therapeutic Exercise: AROM  Comment: Completed BUE exercises x10 reps x2 set using mod resistance band in all joints/planes while seated upright in bed to increase strength and independence required for functional transfers. Pt required rest break between each exercise and min v/c for proper technique. Activity Tolerance:  Activity Tolerance: Patient limited by fatigue  Activity Tolerance: Pt declines OOB activity stating she just returned to bed not long ago.     Assessment:  Performance deficits / Impairments: Decreased functional mobility , Decreased strength, Decreased endurance, Decreased ADL status, Decreased safe awareness, Decreased high-level IADLs, Decreased balance, Decreased cognition  Prognosis: Good, Fair    Discharge Recommendations:  Discharge Recommendations: [address], Patient would benefit from continued therapy after discharge    Patient Education:  Patient Education: BUE exercises, increasing activity, role of OT. Equipment Recommendations:  Equipment Needed: No  Other: defer    Safety:  Safety Devices in place: Yes  Type of devices: All fall risk precautions in place, Gait belt, Bed alarm in place, Patient at risk for falls, Call light within reach, Left in bed    Plan:  Times per week: 3-5x  Current Treatment Recommendations: Strengthening, Endurance Training, Balance Training, Safety Education & Training, Self-Care / ADL, Patient/Caregiver Education & Training, Home Management Training, Functional Mobility Training    Goals:  Patient goals : To return home    Short term goals  Time Frame for Short term goals: By d/c  Short term goal 1: Pt to complete functional mobility to/from bathroom, [facility] distances with consistent CGA for increased indep with accessing environmnet  Short term goal 2: Pt to complete various functional t/fs at [facility] with min cues for safety for increased indep with toileting  Short term goal 3: Pt to tolerate standing greater than 8 mins with consistent CGA for increased indep with standing ADL and IADL tasks  Short term goal 4: Pt to complete LB ADLs using any AD prn with no greater than CGA for increased indep with self cares  Long term goals  Time Frame for Long term goals : NA d/t ELOS  If patient is discharged prior to progress note completion, this note is to serve as the discharge note with all goals being unmet unless indicated otherwise.

## 2019-05-17 NOTE — PROGRESS NOTES
Patient seen and evaluated data reviewed. She is feeling better. Abdomen is benign. She is tolerating oral intake. White count is normal.  Plan is for [facility]CF when bed available. Surgery will sign off. Patient to follow-up in my office in 3-4 weeks for a postoperative check. Call surgery as needed. Thank you.

## 2019-05-18 VITALS
HEIGHT: 70 IN | OXYGEN SATURATION: 94 % | HEART RATE: 63 BPM | WEIGHT: 168 LBS | SYSTOLIC BLOOD PRESSURE: 128 MMHG | RESPIRATION RATE: 18 BRPM | BODY MASS INDEX: 24.05 KG/M2 | DIASTOLIC BLOOD PRESSURE: 59 MMHG | TEMPERATURE: 98.1 F

## 2019-05-18 PROCEDURE — 6370000000 HC RX 637 (ALT 250 FOR IP): Performed by: INTERNAL MEDICINE

## 2019-05-18 PROCEDURE — 6360000002 HC RX W HCPCS: Performed by: SURGERY

## 2019-05-18 RX ORDER — HYDRALAZINE HYDROCHLORIDE 50 MG/1
50 TABLET, FILM COATED ORAL 3 TIMES DAILY
Qty: 90 TABLET | Refills: 3 | Status: ON HOLD | DISCHARGE
Start: 2019-05-18 | End: 2021-01-01 | Stop reason: HOSPADM

## 2019-05-18 RX ADMIN — HYDRALAZINE HYDROCHLORIDE 50 MG: 50 TABLET, FILM COATED ORAL at 10:27

## 2019-05-18 RX ADMIN — ENOXAPARIN SODIUM 40 MG: 40 INJECTION SUBCUTANEOUS at 10:33

## 2019-05-18 RX ADMIN — METOPROLOL TARTRATE 25 MG: 25 TABLET ORAL at 10:27

## 2019-05-18 RX ADMIN — CIPROFLOXACIN 250 MG: 250 TABLET, FILM COATED ORAL at 10:27

## 2019-05-18 ASSESSMENT — PAIN SCALES - GENERAL
PAINLEVEL_OUTOF10: 0
PAINLEVEL_OUTOF10: 0

## 2019-05-18 NOTE — PROGRESS NOTES
[facility], report given to FRAN Syed. AVS, [facility] and script faxed to ECF. [facility] form faxed.

## 2019-05-18 NOTE — PLAN OF CARE
Problem: Pain:  Goal: Pain level will decrease  Description  Pain level will decrease  Outcome: Ongoing  Note:   Patient complains of abdominal pain. Patient takes oral Ultram for pain. Problem: Falls - Risk of:  Goal: Will remain free from falls  Description  Will remain free from falls  Outcome: Ongoing  Note:   Patient free from falls this shift. Patient ambulates with walker and one assist.  Patient is forgetful at times and sets bed alarm off. Problem: Risk for Impaired Skin Integrity  Goal: Tissue integrity - skin and mucous membranes  Description  Structural intactness and normal physiological function of skin and  mucous membranes. Outcome: Ongoing  Note:   Patient has surgical incision to abdomen. Patient also has a laceration to right shin. No other skin breakdown noted at this time. Care plan reviewed with patient. Patient verbalizes understanding of the plan of care and contribute to goal setting.

## 2019-05-18 NOTE — PROGRESS NOTES
Patient discharged to [facility], transported via [facility] in stretcher with all belongings.

## 2019-05-18 NOTE — PROGRESS NOTES
IM Progress Note  Dr. Alvaro Lutz  5/18/2019 10:34 AM      Patient name Yasmin Connors  [id number]  PCP: Jose Luis Valdez MD  Admit Date: 5/13/2019  Acct No. [de-identified]    Subjective: Interval History:   Discharge held today sec to high BP  Tolerated diet  D/w daughter at bedside    Diet: DIET GENERAL;    I/O last 3 completed shifts: In: 049 [P.O.:520; I.V.:123]  Out: -   I/O this shift:  In: -   Out: 375 [Urine:375]        Admission weight: 187 lb (84.8 kg) as of 5/13/2019  8:17 PM  Wt Readings from Last 3 Encounters:   05/14/19 168 lb (76.2 kg)   05/06/19 180 lb 2 oz (81.7 kg)   11/05/18 174 lb 2 oz (79 kg)     Body mass index is 24.11 kg/m². ROS   CVS;  no cp or palpitation  Resp: no SOB or cough  Neuro:  No numbness or weakness or dizziness  Abd: no nausea or vomiting ?abd pain      Medications:   Scheduled Meds:   hydrALAZINE  50 mg Oral TID    metoprolol tartrate  25 mg Oral BID    ciprofloxacin  250 mg Oral 2 times per day    enoxaparin  40 mg Subcutaneous Q24H     Continuous Infusions:      Labs :     CBC:   Recent Labs     05/16/19  0649 05/17/19  0704   WBC 12.1* 9.1   HGB 12.1 12.1   * 174     BMP:    Recent Labs     05/16/19  0649 05/17/19  0704    138   K 3.5 3.9    105   CO2 20* 22*   BUN 11 11   CREATININE 0.5 0.6   GLUCOSE 109* 99     Hepatic:   No results for input(s): AST, ALT, ALB, BILITOT, ALKPHOS in the last 72 hours. Troponin: No results for input(s): TROPONINI in the last 72 hours. BNP: No results for input(s): BNP in the last 72 hours. Lipids: No results for input(s): CHOL, HDL in the last 72 hours. Invalid input(s): LDLCALCU  INR:   No results for input(s): INR in the last 72 hours.     Radiology    Objective:   Vitals: BP (!) 151/70   Pulse 64   Temp 98.1 °F (36.7 °C) (Oral)   Resp 18   Ht 5' 10\" (1.778 m)   Wt 168 lb (76.2 kg)   SpO2 94%   BMI 24.11 kg/m²   HEENT: Head:pupils react  Neck: supple  Lungs: clear to auscultation  Heart: regular rate and rhythm   Abdomen: soft BS are appreciated  NG NT  Extremities: warm  No edema  Neurologic:  Alert, oriented X3    Impression:   :   1.  Acute cholecystitis. s/p lap yeny  2. History of hypertension. 3.  Left bundle branch block, not new. 4.  Mild aortic stenosis and mitral regurgitation. 5.  History of breast cancer, status post bilateral mastectomy. 6.  Leukocytosis. 7.  Chronic kidney disease mentioned on her previous records. Her GFR was greater than 90.  8.  Elevated BNP, but clinically not in any significant failure. 9.  Memory issues. 10.  History of carotid stenosis mentioned with last ultrasound showing less than 50% stenosis.   11 UTI + Kleb sen to cipro      Plan:    Discharge to ECF if BP stable    Bam Field MD

## 2019-05-19 ENCOUNTER — CARE COORDINATION (OUTPATIENT)
Dept: CASE MANAGEMENT | Age: 84
End: 2019-05-19
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
## 2019-05-21 ENCOUNTER — TELEPHONE (OUTPATIENT)
Dept: FAMILY MEDICINE CLINIC | Age: 84
End: 2019-05-21

## 2019-05-21 NOTE — TELEPHONE ENCOUNTER
Call placed to [facility]. Spoke with Gómez Boyd, and she voiced Junito Sheikh has been admitted to the facility after her D/C from [facility]. Plan is for her to return home from rehab. CC asked to be notified of D/C.

## 2019-05-23 ENCOUNTER — OUTSIDE SERVICES (OUTPATIENT)
Dept: FAMILY MEDICINE CLINIC | Age: 84
End: 2019-05-23
Payer: MEDICARE

## 2019-05-23 VITALS
RESPIRATION RATE: 20 BRPM | SYSTOLIC BLOOD PRESSURE: 138 MMHG | WEIGHT: 175.6 LBS | TEMPERATURE: 97.1 F | HEART RATE: 88 BPM | BODY MASS INDEX: 25.14 KG/M2 | DIASTOLIC BLOOD PRESSURE: 88 MMHG | HEIGHT: 70 IN

## 2019-05-23 DIAGNOSIS — N18.1 STAGE 1 CHRONIC KIDNEY DISEASE: ICD-10-CM

## 2019-05-23 DIAGNOSIS — N39.0 ACUTE URINARY TRACT INFECTION: ICD-10-CM

## 2019-05-23 DIAGNOSIS — N31.9 NEUROGENIC BLADDER: ICD-10-CM

## 2019-05-23 DIAGNOSIS — I10 ESSENTIAL HYPERTENSION: ICD-10-CM

## 2019-05-23 DIAGNOSIS — Z90.49 S/P CHOLECYSTECTOMY: ICD-10-CM

## 2019-05-23 DIAGNOSIS — K81.0 ACUTE CHOLECYSTITIS: Primary | ICD-10-CM

## 2019-05-23 DIAGNOSIS — M15.9 PRIMARY OSTEOARTHRITIS INVOLVING MULTIPLE JOINTS: ICD-10-CM

## 2019-05-23 DIAGNOSIS — R53.81 PHYSICAL DECONDITIONING: ICD-10-CM

## 2019-05-23 DIAGNOSIS — I50.32 CHRONIC DIASTOLIC CHF (CONGESTIVE HEART FAILURE) (HCC): ICD-10-CM

## 2019-05-23 DIAGNOSIS — I35.0 AORTIC STENOSIS, MODERATE: ICD-10-CM

## 2019-05-23 PROCEDURE — 1123F ACP DISCUSS/DSCN MKR DOCD: CPT | Performed by: FAMILY MEDICINE

## 2019-05-23 PROCEDURE — 99305 1ST NF CARE MODERATE MDM 35: CPT | Performed by: FAMILY MEDICINE

## 2019-06-07 ENCOUNTER — CARE COORDINATION (OUTPATIENT)
Dept: CASE MANAGEMENT | Age: 84
End: 2019-06-07

## 2019-06-07 NOTE — CARE COORDINATION
[facility] Transitions Initial Follow Up Call    Call within 2 business days of discharge: Yes    Patient: Nura Plasencia Patient : 10/3/1931   MRN: <[id number]>    Discharge Date: 19 RARS: Readmission Risk Score: 11      Last Discharge [address] [address]       Complaint Diagnosis Description Type Department Provider    19 Abdominal Pain Acute cholecystitis ED to Hosp-Admission (Discharged) (ADMITTED) Teodoro Haddad MD; Manav Heck. .. Facility: [facility] provided:      1st attempt to make contact for a follow up call, LVM introducing self, role, and nature of the call, contact information provided. Will attempt later.   CAROL AragonN RN  Care Transition Coordinator  [phone]        Follow Up  Future Appointments   Date Time Provider Minda Trinidad   2019 10:45 AM Gisel Mccormick MD [facility]   10/28/2019 10:00 AM Fernando Santamaria MD [facility]       Rubin Mcleod, RN

## 2019-06-10 ENCOUNTER — CARE COORDINATION (OUTPATIENT)
Dept: CASE MANAGEMENT | Age: 84
End: 2019-06-10

## 2019-06-11 ENCOUNTER — CARE COORDINATION (OUTPATIENT)
Dept: CASE MANAGEMENT | Age: 84
End: 2019-06-11

## 2019-06-11 DIAGNOSIS — I50.32 CHRONIC DIASTOLIC CHF (CONGESTIVE HEART FAILURE) (HCC): Primary | ICD-10-CM

## 2019-06-11 NOTE — CARE COORDINATION
[facility] Transitions Follow Up Call    2019    Patient: Argentine Doing  Patient : 10/3/1931   MRN: <[id number]>    Discharge Date: 19 RARS: Readmission Risk Score: 11         Spoke with: Nurse Castro    Received a return call from nurse Castro at [facility]. Medications reviewed. Post acute care coordinator signing off.  CAROL ManriquezN RN  Care Transition Coordinator  [phone]      Follow Up  Future Appointments   Date Time Provider Minda Trinidad   2019 10:45 AM Yari Leger MD [facility] [place]   10/28/2019 10:00 AM Terri Wise MD [facility]       Nahun Walker, RN

## 2019-06-11 NOTE — CARE COORDINATION
[facility] Transitions Follow Up Call    2019    Patient: Flori Guidry  Patient : 10/3/1931   MRN: <[id number]>    Discharge Date: 19 RARS: Readmission Risk Score: 11         Spoke with: Nusrat LINDO    Care Transitions Subsequent and Final Call    Subsequent and Final Calls  Care Transitions Interventions  Other Interventions:        Spoke with GUICHO Win. He stated he was out in the woods and communication was difficult. This nurse called [facility] to obtain patient location. Staff reported patient went to [facility]. [address] several times d/t being transferred to numerous locations. Nurse Gloria for patient stated she is in the middle of a med pass on the opposite side and would call me when she gets to the med cart for patient. Contact information provided. Post acute care coordinator signing off d/t 24hr care.  MARI Torres RN  Care Transition Coordinator  [phone]      Follow Up  Future Appointments   Date Time Provider Minda Trinidad   2019 10:45 AM Sheila Grace MD Adv Surg [facility] - [address] [facility]   10/28/2019 10:00 AM Aimee Kumar MD [facility]       Nola Peñaloza, RN

## 2019-06-28 ENCOUNTER — TELEPHONE (OUTPATIENT)
Dept: FAMILY MEDICINE CLINIC | Age: 84
End: 2019-06-28

## 2020-01-01 ENCOUNTER — HOSPITAL ENCOUNTER (EMERGENCY)
Age: 85
Discharge: HOME | End: 2020-12-15
Payer: MEDICARE

## 2020-01-01 ENCOUNTER — TELEPHONE (OUTPATIENT)
Dept: FAMILY MEDICINE CLINIC | Age: 85
End: 2020-01-01

## 2020-01-01 VITALS
DIASTOLIC BLOOD PRESSURE: 71 MMHG | RESPIRATION RATE: 16 BRPM | HEART RATE: 59 BPM | SYSTOLIC BLOOD PRESSURE: 182 MMHG | OXYGEN SATURATION: 95 %

## 2020-01-01 LAB
AMORPHOUS: ABNORMAL
BACTERIA: ABNORMAL /HPF
BILIRUBIN URINE: NEGATIVE
BLOOD, URINE: NEGATIVE
CASTS 2: ABNORMAL /LPF
CASTS UA: ABNORMAL /LPF
CHARACTER, URINE: ABNORMAL
COLOR: YELLOW
CRYSTALS, UA: ABNORMAL
EPITHELIAL CELLS, UA: ABNORMAL /HPF
GLUCOSE URINE: NEGATIVE MG/DL
KETONES, URINE: NEGATIVE
LEUKOCYTE ESTERASE, URINE: ABNORMAL
MISCELLANEOUS 2: ABNORMAL
NITRITE, URINE: POSITIVE
ORGANISM: ABNORMAL
ORGANISM: ABNORMAL
PH UA: 8.5 (ref 5–9)
PROTEIN UA: 30
RBC URINE: ABNORMAL /HPF
RENAL EPITHELIAL, UA: ABNORMAL
SARS-COV-2: NOT DETECTED
SPECIFIC GRAVITY, URINE: 1.02 (ref 1–1.03)
URINE CULTURE REFLEX: ABNORMAL
URINE CULTURE REFLEX: ABNORMAL
UROBILINOGEN, URINE: 1 EU/DL (ref 0–1)
WBC UA: ABNORMAL /HPF
YEAST: ABNORMAL

## 2020-01-01 PROCEDURE — 81001 URINALYSIS AUTO W/SCOPE: CPT

## 2020-01-01 PROCEDURE — 87077 CULTURE AEROBIC IDENTIFY: CPT

## 2020-01-01 PROCEDURE — 99284 EMERGENCY DEPT VISIT MOD MDM: CPT

## 2020-01-01 PROCEDURE — 87086 URINE CULTURE/COLONY COUNT: CPT

## 2020-01-01 PROCEDURE — 87186 SC STD MICRODIL/AGAR DIL: CPT

## 2020-01-01 PROCEDURE — 51702 INSERT TEMP BLADDER CATH: CPT

## 2020-01-01 ASSESSMENT — ENCOUNTER SYMPTOMS
RHINORRHEA: 0
COUGH: 0
ABDOMINAL PAIN: 0
WHEEZING: 0
SHORTNESS OF BREATH: 0
COLOR CHANGE: 0
VOMITING: 0
NAUSEA: 0
DIARRHEA: 0

## 2020-03-18 PROBLEM — Z85.3 H/O MALIGNANT NEOPLASM OF FEMALE BREAST: Status: ACTIVE | Noted: 2020-03-18

## 2020-03-18 PROBLEM — Z90.13 H/O BILATERAL MASTECTOMY: Status: ACTIVE | Noted: 2020-03-18

## 2020-12-14 NOTE — TELEPHONE ENCOUNTER
Jennifer at [facility] called to inform you pt is urinating past her catheter. Jennifer informed me if they cannot get the catheter replace correctly, they will send her out.

## 2020-12-15 NOTE — ED PROVIDER NOTES
[facility] 65 22 COMPLAINT       Chief Complaint   Patient presents with    Other     sanches replacement       Nurses Notes reviewed and I agree except as notedin the HPI. HISTORY OF PRESENT ILLNESS    Candace Miller is a 80 y.o. female who presents from [facility] because her urinary catheter is out. She reports she did not pull it out and does not know how it came out, she just woke up with the catheter beside her in bed this AM. Chart review reveals placement of urinary catheter yesterday was slighlty difficult and were advised to send her here to have her catheter replaced. Denies abdominal pain, chest pain, blood in urine or stool, flank pain, syncope, shortness of breath. REVIEW OF SYSTEMS     Review of Systems   Constitutional: Negative for activity change, chills and fever. HENT: Negative for congestion, ear pain and rhinorrhea. Respiratory: Negative for cough, shortness of breath and wheezing. Cardiovascular: Negative for chest pain. Gastrointestinal: Negative for abdominal pain, diarrhea, nausea and vomiting. Genitourinary: Negative for difficulty urinating, dysuria, flank pain and hematuria. Skin: Negative for color change and rash. Neurological: Negative for dizziness, light-headedness and headaches. Psychiatric/Behavioral: Negative for behavioral problems, decreased concentration and hallucinations. PAST MEDICAL HISTORY    has a past medical history of Arthritis, Bilateral carotid artery disease ([address] Breast CA ([address] Chronic diastolic CHF (congestive heart failure) ([address] Chronic kidney disease, Colon, diverticulosis, GERD (gastroesophageal reflux disease), Hiatal hernia with gastroesophageal reflux, Hypertension, Insomnia, LBBB (left bundle branch block), Movement disorder, Neurogenic bladder, Osteoporosis, and Pneumonia.     SURGICAL HISTORY      has a past surgical history that includes Mastectomy (right ; left ); Lumbar disc arthroplasty (; ); Total hip arthroplasty (right ; left ); Total knee arthroplasty (left and right ); Cataract removal (); Colonoscopy (); Upper gastrointestinal endoscopy (); laminectomy (2012); shoulder surgery (Left, 2012); Breast surgery; joint replacement; Endoscopy, colon, diagnostic; Tonsillectomy; eye surgery; back surgery (); Humerus fracture surgery (Right, 2017); pr office/outpt visit,procedure only (Left, 2018); and Cholecystectomy, laparoscopic (N/A, 2019). CURRENT MEDICATIONS       Discharge Medication List as of 12/15/2020 10:49 AM      CONTINUE these medications which have NOT CHANGED    Details   hydrALAZINE (APRESOLINE) 50 MG tablet Take 1 tablet by mouth 3 times daily, Disp-90 tablet, R-3DC to SNF      metoprolol tartrate (LOPRESSOR) 25 MG tablet Take 1 tablet by mouth 2 times daily, Disp-60 tablet, R-3DC to SNF      acetaminophen (APAP EXTRA STRENGTH) 500 MG tablet Take 2 tablets by mouth every 6 hours as needed for Pain, Disp-120 tablet, R-3DC to SNF      ferrous sulfate (FE TABS) 325 (65 Fe) MG EC tablet Take 1 tablet by mouth 2 times dailyOTC             ALLERGIES     has No Known Allergies. HISTORY     She indicated that her mother is . She indicated that her father is . She indicated that her sister is . She indicated that her brother is . She indicated that one of her two daughters is . She indicated that her son is . family history includes Cancer in her brother, daughter, father, and son; Billee Bowels in her daughter; Heart Disease in her mother and sister. SOCIALHISTORY      reports that she quit smoking about 37 years ago. Her smoking use included cigarettes. She has a 80.00 pack-year smoking history. She has never used smokeless tobacco. She reports that she does not drink alcohol or use drugs.     PHYSICAL EXAM     INITIAL VITALS: blood pressure is 182/71 (abnormal) and her pulse is 59. Her respiration is 16 and oxygen saturation is 95%. Physical Exam  Constitutional:       General: She is not in acute distress. Appearance: She is well-developed. She is not ill-appearing or diaphoretic. HENT:      Head: Normocephalic and atraumatic. Eyes:      General: No scleral icterus. Pupils: Pupils are equal, round, and reactive to light. Neck:      Musculoskeletal: Normal range of motion and neck supple. Cardiovascular:      Rate and Rhythm: Normal rate. Pulmonary:      Effort: Pulmonary effort is normal. No respiratory distress. Breath sounds: Normal breath sounds. Abdominal:      General: Abdomen is flat. Bowel sounds are normal. There is no distension. Palpations: Abdomen is soft. Tenderness: There is no abdominal tenderness. There is no right CVA tenderness, left CVA tenderness or guarding. Genitourinary:     Comments: Catheter placed by nursing  Musculoskeletal: Normal range of motion. Skin:     General: Skin is warm and dry. Neurological:      Mental Status: She is alert and oriented to person, place, and time.    Psychiatric:         Behavior: Behavior normal.         DIFFERENTIAL DIAGNOSIS:   Catheter removed due to poor placement vs patient removal, UTI    DIAGNOSTIC RESULTS     EKG: All EKG's are interpreted by the Emergency Department Physician who either signs or Co-signs this chart in the absence of a cardiologist.      RADIOLOGY: non-plain film images(s) such as CT, Ultrasound and MRI are read by the radiologist.  None      LABS:   Labs Reviewed   URINE WITH REFLEXED MICRO - Abnormal; Notable for the following components:       Result Value    Protein, UA 30 (*)     Nitrite, Urine POSITIVE (*)     Leukocyte Esterase, Urine LARGE (*)     Character, Urine TURBID (*)     All other components within normal limits   CULTURE, REFLEXED, URINE       EMERGENCY DEPARTMENT COURSE:   :    Vitals:    12/15/20 0951   BP: (!) 182/71   Pulse: 59   Resp: 16   SpO2: 95%     Patient was seen history physical exam was performed. See disposition below  Ms. Aurea Blank presented from [facility] for a removed catheter. She woke up and her catheter was out and beside her in bed. Upon arrival, vital signs were assessed and stable, hypertensive at 182/71. On PE, a new catheter was placed by nursing staff in ED. catheters were placed per the nursing staff without difficulty. Will send back to [facility]     CRITICAL CARE:  None    CONSULTS:  None    PROCEDURES:  None    FINAL IMPRESSION      1.  Malfunction of Shaikh catheter, initial encounter [facility])          DISPOSITION/PLAN   Discharge    PATIENT REFERRED TO:  Ange Stokes MD  [address] [place] [address] [address]  [phone]    In 1 week        DISCHARGE MEDICATIONS:  Discharge Medication List as of 12/15/2020 10:49 AM          (Please note that portions of this note were completed with a voice recognitionprogram.  Efforts were made to edit the dictations but occasionally words are mis-transcribed.)    KASIE Elmore [place]  12/15/20 1232

## 2020-12-15 NOTE — ED NOTES
Pt being sent to ED from [facility] after pulling her sanches out. RN states that they had trouble placing the cath yesterday so the doctor said to send her in today to have it replaced.      Parth Cunningham RN  12/15/20 0062
Spoke with Nurse from [facility]. Patient ok to be sent back by cab. Nurse reports they will be ready for patient to be dropped off. Patient alert and oriented x4.      Teddy Doss RN  12/15/20 2565
Alert and oriented to person, place and time

## 2020-12-18 NOTE — PROGRESS NOTES
Pharmacy Note  ED Culture Follow-up    Arnoldo Perez is a 80 y.o. female. Allergies: Patient has no known allergies. Labs:  Lab Results   Component Value Date    BUN 11 05/17/2019    CREATININE 0.6 05/17/2019    WBC 9.1 05/17/2019     CrCl cannot be calculated (Patient's most recent lab result is older than the maximum 10 days allowed. ). Current antimicrobials:   none    ASSESSMENT:  Micro results:   Urine culture: positive for providencia and morganella     PLAN:  Need for intervention: No  Discussed with: RENAY Winters  Chosen treatment:    No treatment indicated    Patient response:   No need to contact patient    Called/sent in prescription to: Not applicable    Please call with any questions.  Erasmo Blevins, PharmD 5:50 PM 12/18/2020

## 2021-01-01 ENCOUNTER — HOSPITAL ENCOUNTER (INPATIENT)
Age: 86
LOS: 7 days | Discharge: NURSING, REHAB OR LONG-TERM CARE | DRG: 477 | End: 2021-03-13
Attending: EMERGENCY MEDICINE | Admitting: SURGERY
Payer: MEDICARE

## 2021-01-01 ENCOUNTER — OUTSIDE SERVICES (OUTPATIENT)
Dept: FAMILY MEDICINE CLINIC | Age: 86
End: 2021-01-01
Payer: MEDICARE

## 2021-01-01 ENCOUNTER — APPOINTMENT (OUTPATIENT)
Dept: CT IMAGING | Age: 86
DRG: 477 | End: 2021-01-01
Payer: MEDICARE

## 2021-01-01 ENCOUNTER — ANESTHESIA (OUTPATIENT)
Dept: OPERATING ROOM | Age: 86
DRG: 477 | End: 2021-01-01
Payer: MEDICARE

## 2021-01-01 ENCOUNTER — TELEPHONE (OUTPATIENT)
Dept: PHYSICAL MEDICINE AND REHAB | Age: 86
End: 2021-01-01

## 2021-01-01 ENCOUNTER — APPOINTMENT (OUTPATIENT)
Dept: GENERAL RADIOLOGY | Age: 86
DRG: 871 | End: 2021-01-01
Payer: MEDICARE

## 2021-01-01 ENCOUNTER — APPOINTMENT (OUTPATIENT)
Dept: GENERAL RADIOLOGY | Age: 86
DRG: 477 | End: 2021-01-01
Payer: MEDICARE

## 2021-01-01 ENCOUNTER — HOSPITAL ENCOUNTER (INPATIENT)
Age: 86
LOS: 6 days | Discharge: OTHER INSTITUTION | DRG: 871 | End: 2021-03-31
Attending: EMERGENCY MEDICINE | Admitting: INTERNAL MEDICINE
Payer: MEDICARE

## 2021-01-01 ENCOUNTER — APPOINTMENT (OUTPATIENT)
Dept: CT IMAGING | Age: 86
DRG: 871 | End: 2021-01-01
Payer: MEDICARE

## 2021-01-01 ENCOUNTER — ANESTHESIA EVENT (OUTPATIENT)
Dept: OPERATING ROOM | Age: 86
DRG: 477 | End: 2021-01-01
Payer: MEDICARE

## 2021-01-01 ENCOUNTER — TELEPHONE (OUTPATIENT)
Dept: ENT CLINIC | Age: 86
End: 2021-01-01

## 2021-01-01 ENCOUNTER — APPOINTMENT (OUTPATIENT)
Dept: MRI IMAGING | Age: 86
DRG: 477 | End: 2021-01-01
Payer: MEDICARE

## 2021-01-01 VITALS
HEIGHT: 69 IN | HEART RATE: 78 BPM | DIASTOLIC BLOOD PRESSURE: 76 MMHG | SYSTOLIC BLOOD PRESSURE: 135 MMHG | WEIGHT: 122 LBS | RESPIRATION RATE: 18 BRPM | TEMPERATURE: 97.5 F | BODY MASS INDEX: 18.07 KG/M2

## 2021-01-01 VITALS
RESPIRATION RATE: 16 BRPM | DIASTOLIC BLOOD PRESSURE: 60 MMHG | HEIGHT: 69 IN | BODY MASS INDEX: 20.59 KG/M2 | TEMPERATURE: 98 F | SYSTOLIC BLOOD PRESSURE: 138 MMHG | HEART RATE: 86 BPM | WEIGHT: 139 LBS

## 2021-01-01 VITALS
RESPIRATION RATE: 18 BRPM | HEIGHT: 71 IN | OXYGEN SATURATION: 95 % | SYSTOLIC BLOOD PRESSURE: 158 MMHG | DIASTOLIC BLOOD PRESSURE: 68 MMHG | WEIGHT: 131.4 LBS | BODY MASS INDEX: 18.4 KG/M2 | TEMPERATURE: 98.6 F | HEART RATE: 70 BPM

## 2021-01-01 VITALS
WEIGHT: 130 LBS | BODY MASS INDEX: 19.26 KG/M2 | SYSTOLIC BLOOD PRESSURE: 140 MMHG | OXYGEN SATURATION: 96 % | HEIGHT: 69 IN | DIASTOLIC BLOOD PRESSURE: 63 MMHG | HEART RATE: 64 BPM | RESPIRATION RATE: 16 BRPM | TEMPERATURE: 98.2 F

## 2021-01-01 VITALS — OXYGEN SATURATION: 100 % | SYSTOLIC BLOOD PRESSURE: 135 MMHG | DIASTOLIC BLOOD PRESSURE: 61 MMHG

## 2021-01-01 DIAGNOSIS — H90.3 SENSORINEURAL HEARING LOSS (SNHL), BILATERAL: Primary | ICD-10-CM

## 2021-01-01 DIAGNOSIS — N18.1 CKD (CHRONIC KIDNEY DISEASE) STAGE 1, GFR 90 ML/MIN OR GREATER: ICD-10-CM

## 2021-01-01 DIAGNOSIS — R11.0 NAUSEA: ICD-10-CM

## 2021-01-01 DIAGNOSIS — D72.829 LEUKOCYTOSIS, UNSPECIFIED TYPE: ICD-10-CM

## 2021-01-01 DIAGNOSIS — M15.9 PRIMARY OSTEOARTHRITIS INVOLVING MULTIPLE JOINTS: ICD-10-CM
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
## 2021-01-01 DIAGNOSIS — G89.11 ACUTE PAIN DUE TO INJURY: Primary | ICD-10-CM

## 2021-01-01 DIAGNOSIS — R41.82 ALTERED MENTAL STATUS, UNSPECIFIED ALTERED MENTAL STATUS TYPE: ICD-10-CM

## 2021-01-01 DIAGNOSIS — I77.9 BILATERAL CAROTID ARTERY DISEASE, UNSPECIFIED TYPE (HCC): ICD-10-CM
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
## 2021-01-01 DIAGNOSIS — Z97.8 FOLEY CATHETER IN PLACE ON ADMISSION: ICD-10-CM
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
## 2021-01-01 DIAGNOSIS — E43 SEVERE MALNUTRITION (HCC): Chronic | ICD-10-CM
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
## 2021-01-01 DIAGNOSIS — S32.10XD CLOSED FRACTURE OF SACRUM WITH ROUTINE HEALING, UNSPECIFIED PORTION OF SACRUM, SUBSEQUENT ENCOUNTER: ICD-10-CM

## 2021-01-01 DIAGNOSIS — F43.20 ADJUSTMENT DISORDER, UNSPECIFIED TYPE: ICD-10-CM

## 2021-01-01 DIAGNOSIS — I35.0 AORTIC STENOSIS, MILD: ICD-10-CM
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
## 2021-01-01 DIAGNOSIS — N31.9 NEUROGENIC BLADDER: ICD-10-CM

## 2021-01-01 DIAGNOSIS — M53.3 SACRAL PAIN: ICD-10-CM

## 2021-01-01 DIAGNOSIS — S32.10XA CLOSED FRACTURE OF SACRUM, UNSPECIFIED PORTION OF SACRUM, INITIAL ENCOUNTER (HCC): Primary | ICD-10-CM

## 2021-01-01 DIAGNOSIS — I10 ESSENTIAL HYPERTENSION: ICD-10-CM

## 2021-01-01 DIAGNOSIS — R11.2 NAUSEA AND VOMITING, INTRACTABILITY OF VOMITING NOT SPECIFIED, UNSPECIFIED VOMITING TYPE: Primary | ICD-10-CM

## 2021-01-01 DIAGNOSIS — F03.90 DEMENTIA WITHOUT BEHAVIORAL DISTURBANCE, UNSPECIFIED DEMENTIA TYPE: ICD-10-CM

## 2021-01-01 DIAGNOSIS — F39 MOOD DISORDER (HCC): ICD-10-CM

## 2021-01-01 DIAGNOSIS — S01.01XD LACERATION OF SCALP, SUBSEQUENT ENCOUNTER: ICD-10-CM

## 2021-01-01 DIAGNOSIS — I50.32 CHRONIC DIASTOLIC CHF (CONGESTIVE HEART FAILURE) (HCC): ICD-10-CM

## 2021-01-01 DIAGNOSIS — H91.93 BILATERAL HEARING LOSS, UNSPECIFIED HEARING LOSS TYPE: ICD-10-CM

## 2021-01-01 DIAGNOSIS — S01.01XA LACERATION OF SCALP, INITIAL ENCOUNTER: ICD-10-CM

## 2021-01-01 DIAGNOSIS — J90 PLEURAL EFFUSION: ICD-10-CM
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
## 2021-01-01 DIAGNOSIS — R09.02 HYPOXIA: ICD-10-CM

## 2021-01-01 DIAGNOSIS — K59.09 OTHER CONSTIPATION: ICD-10-CM

## 2021-01-01 DIAGNOSIS — Z97.8 CHRONIC INDWELLING FOLEY CATHETER: ICD-10-CM

## 2021-01-01 DIAGNOSIS — R53.81 PHYSICAL DECONDITIONING: ICD-10-CM

## 2021-01-01 DIAGNOSIS — Z86.59 HISTORY OF DEPRESSION: ICD-10-CM

## 2021-01-01 DIAGNOSIS — S09.90XA CLOSED HEAD INJURY, INITIAL ENCOUNTER: ICD-10-CM

## 2021-01-01 DIAGNOSIS — N39.0 ACUTE URINARY TRACT INFECTION: ICD-10-CM

## 2021-01-01 DIAGNOSIS — R77.8 ELEVATED TROPONIN: ICD-10-CM

## 2021-01-01 DIAGNOSIS — F43.20 ADJUSTMENT DISORDER, UNSPECIFIED TYPE: Primary | ICD-10-CM

## 2021-01-01 DIAGNOSIS — R63.4 WEIGHT LOSS: ICD-10-CM

## 2021-01-01 DIAGNOSIS — M53.3 SACRAL PAIN: Primary | ICD-10-CM

## 2021-01-01 DIAGNOSIS — S32.10XA CLOSED FRACTURE OF SACRUM, UNSPECIFIED PORTION OF SACRUM, INITIAL ENCOUNTER (HCC): ICD-10-CM

## 2021-01-01 DIAGNOSIS — J69.0 ASPIRATION PNEUMONIA, UNSPECIFIED ASPIRATION PNEUMONIA TYPE, UNSPECIFIED LATERALITY, UNSPECIFIED PART OF LUNG (HCC): Primary | ICD-10-CM

## 2021-01-01 DIAGNOSIS — S09.90XD CLOSED HEAD INJURY, SUBSEQUENT ENCOUNTER: ICD-10-CM

## 2021-01-01 DIAGNOSIS — S32.10XS CLOSED FRACTURE OF SACRUM, UNSPECIFIED PORTION OF SACRUM, SEQUELA: ICD-10-CM

## 2021-01-01 LAB
ALBUMIN SERPL-MCNC: 2.6 G/DL (ref 3.5–5.1)
ALBUMIN SERPL-MCNC: 3.2 G/DL (ref 3.5–5.1)
ALBUMIN SERPL-MCNC: 3.9 G/DL (ref 3.5–5.1)
ALP BLD-CCNC: 125 U/L (ref 38–126)
ALP BLD-CCNC: 81 U/L (ref 38–126)
ALP BLD-CCNC: 89 U/L (ref 38–126)
ALT SERPL-CCNC: 10 U/L (ref 11–66)
ALT SERPL-CCNC: 13 U/L (ref 11–66)
ALT SERPL-CCNC: 15 U/L (ref 11–66)
AMMONIA: 13 UMOL/L (ref 11–60)
AMORPHOUS: ABNORMAL
ANION GAP SERPL CALCULATED.3IONS-SCNC: 10 MEQ/L (ref 8–16)
ANION GAP SERPL CALCULATED.3IONS-SCNC: 10 MEQ/L (ref 8–16)
ANION GAP SERPL CALCULATED.3IONS-SCNC: 11 MEQ/L (ref 8–16)
ANION GAP SERPL CALCULATED.3IONS-SCNC: 13 MEQ/L (ref 8–16)
ANION GAP SERPL CALCULATED.3IONS-SCNC: 6 MEQ/L (ref 8–16)
ANION GAP SERPL CALCULATED.3IONS-SCNC: 9 MEQ/L (ref 8–16)
ANION GAP SERPL CALCULATED.3IONS-SCNC: 9 MEQ/L (ref 8–16)
AST SERPL-CCNC: 17 U/L (ref 5–40)
AST SERPL-CCNC: 18 U/L (ref 5–40)
AST SERPL-CCNC: 19 U/L (ref 5–40)
ATYPICAL LYMPHOCYTES: ABNORMAL %
BACTERIA: ABNORMAL
BACTERIA: ABNORMAL
BASOPHILS # BLD: 0.3 %
BASOPHILS # BLD: 0.5 %
BASOPHILS # BLD: 0.8 %
BASOPHILS ABSOLUTE: 0 THOU/MM3 (ref 0–0.1)
BASOPHILS ABSOLUTE: 0.1 THOU/MM3 (ref 0–0.1)
BILIRUB SERPL-MCNC: 0.3 MG/DL (ref 0.3–1.2)
BILIRUB SERPL-MCNC: 0.5 MG/DL (ref 0.3–1.2)
BILIRUB SERPL-MCNC: 0.6 MG/DL (ref 0.3–1.2)
BILIRUBIN DIRECT: < 0.2 MG/DL (ref 0–0.3)
BILIRUBIN URINE: ABNORMAL
BILIRUBIN URINE: ABNORMAL
BILIRUBIN URINE: NEGATIVE
BLOOD, URINE: NEGATIVE
BUN BLDV-MCNC: 10 MG/DL (ref 7–22)
BUN BLDV-MCNC: 11 MG/DL (ref 7–22)
BUN BLDV-MCNC: 12 MG/DL (ref 7–22)
BUN BLDV-MCNC: 13 MG/DL (ref 7–22)
BUN BLDV-MCNC: 6 MG/DL (ref 7–22)
BUN BLDV-MCNC: 6 MG/DL (ref 7–22)
BUN BLDV-MCNC: 8 MG/DL (ref 7–22)
CALCIUM SERPL-MCNC: 8 MG/DL (ref 8.5–10.5)
CALCIUM SERPL-MCNC: 8 MG/DL (ref 8.5–10.5)
CALCIUM SERPL-MCNC: 8.3 MG/DL (ref 8.5–10.5)
CALCIUM SERPL-MCNC: 8.5 MG/DL (ref 8.5–10.5)
CALCIUM SERPL-MCNC: 8.6 MG/DL (ref 8.5–10.5)
CALCIUM SERPL-MCNC: 9 MG/DL (ref 8.5–10.5)
CALCIUM SERPL-MCNC: 9.2 MG/DL (ref 8.5–10.5)
CASTS: ABNORMAL /LPF
CHARACTER, URINE: ABNORMAL
CHARACTER, URINE: CLEAR
CHARACTER, URINE: CLEAR
CHLORIDE BLD-SCNC: 100 MEQ/L (ref 98–111)
CHLORIDE BLD-SCNC: 102 MEQ/L (ref 98–111)
CHLORIDE BLD-SCNC: 104 MEQ/L (ref 98–111)
CHLORIDE BLD-SCNC: 104 MEQ/L (ref 98–111)
CHLORIDE BLD-SCNC: 105 MEQ/L (ref 98–111)
CHLORIDE BLD-SCNC: 105 MEQ/L (ref 98–111)
CHLORIDE BLD-SCNC: 99 MEQ/L (ref 98–111)
CHOLESTEROL, TOTAL: 107 MG/DL (ref 100–199)
CO2: 19 MEQ/L (ref 23–33)
CO2: 20 MEQ/L (ref 23–33)
CO2: 20 MEQ/L (ref 23–33)
CO2: 25 MEQ/L (ref 23–33)
CO2: 26 MEQ/L (ref 23–33)
COLOR: YELLOW
CREAT SERPL-MCNC: 0.4 MG/DL (ref 0.4–1.2)
CREAT SERPL-MCNC: 0.5 MG/DL (ref 0.4–1.2)
CREAT SERPL-MCNC: 0.5 MG/DL (ref 0.4–1.2)
CREAT SERPL-MCNC: 0.6 MG/DL (ref 0.4–1.2)
CREAT SERPL-MCNC: 0.7 MG/DL (ref 0.4–1.2)
CRYSTALS: ABNORMAL
CRYSTALS: ABNORMAL
EKG ATRIAL RATE: 54 BPM
EKG ATRIAL RATE: 72 BPM
EKG ATRIAL RATE: 76 BPM
EKG ATRIAL RATE: 80 BPM
EKG P AXIS: 0 DEGREES
EKG P AXIS: 117 DEGREES
EKG P AXIS: 77 DEGREES
EKG P AXIS: 83 DEGREES
EKG P-R INTERVAL: 216 MS
EKG P-R INTERVAL: 236 MS
EKG P-R INTERVAL: 258 MS
EKG P-R INTERVAL: 262 MS
EKG Q-T INTERVAL: 448 MS
EKG Q-T INTERVAL: 452 MS
EKG Q-T INTERVAL: 458 MS
EKG Q-T INTERVAL: 542 MS
EKG QRS DURATION: 134 MS
EKG QRS DURATION: 140 MS
EKG QRS DURATION: 140 MS
EKG QRS DURATION: 142 MS
EKG QTC CALCULATION (BAZETT): 501 MS
EKG QTC CALCULATION (BAZETT): 504 MS
EKG QTC CALCULATION (BAZETT): 513 MS
EKG QTC CALCULATION (BAZETT): 521 MS
EKG R AXIS: -5 DEGREES
EKG R AXIS: -9 DEGREES
EKG R AXIS: 0 DEGREES
EKG R AXIS: 9 DEGREES
EKG T AXIS: 106 DEGREES
EKG T AXIS: 129 DEGREES
EKG T AXIS: 173 DEGREES
EKG T AXIS: 179 DEGREES
EKG VENTRICULAR RATE: 54 BPM
EKG VENTRICULAR RATE: 72 BPM
EKG VENTRICULAR RATE: 76 BPM
EKG VENTRICULAR RATE: 80 BPM
EOSINOPHIL # BLD: 0.1 %
EOSINOPHIL # BLD: 1.9 %
EOSINOPHIL # BLD: 2.4 %
EOSINOPHIL # BLD: 3.1 %
EOSINOPHIL # BLD: 7.2 %
EOSINOPHILS ABSOLUTE: 0 THOU/MM3 (ref 0–0.4)
EOSINOPHILS ABSOLUTE: 0.2 THOU/MM3 (ref 0–0.4)
EOSINOPHILS ABSOLUTE: 0.3 THOU/MM3 (ref 0–0.4)
EOSINOPHILS ABSOLUTE: 0.3 THOU/MM3 (ref 0–0.4)
EOSINOPHILS ABSOLUTE: 0.5 THOU/MM3 (ref 0–0.4)
EPITHELIAL CELLS, UA: ABNORMAL /HPF
EPITHELIAL CELLS, UA: ABNORMAL /HPF
ERYTHROCYTE [DISTWIDTH] IN BLOOD BY AUTOMATED COUNT: 15.3 % (ref 11.5–14.5)
ERYTHROCYTE [DISTWIDTH] IN BLOOD BY AUTOMATED COUNT: 15.4 % (ref 11.5–14.5)
ERYTHROCYTE [DISTWIDTH] IN BLOOD BY AUTOMATED COUNT: 15.4 % (ref 11.5–14.5)
ERYTHROCYTE [DISTWIDTH] IN BLOOD BY AUTOMATED COUNT: 15.5 % (ref 11.5–14.5)
ERYTHROCYTE [DISTWIDTH] IN BLOOD BY AUTOMATED COUNT: 15.8 % (ref 11.5–14.5)
ERYTHROCYTE [DISTWIDTH] IN BLOOD BY AUTOMATED COUNT: 15.8 % (ref 11.5–14.5)
ERYTHROCYTE [DISTWIDTH] IN BLOOD BY AUTOMATED COUNT: 16.1 % (ref 11.5–14.5)
ERYTHROCYTE [DISTWIDTH] IN BLOOD BY AUTOMATED COUNT: 53.6 FL (ref 35–45)
ERYTHROCYTE [DISTWIDTH] IN BLOOD BY AUTOMATED COUNT: 54.4 FL (ref 35–45)
ERYTHROCYTE [DISTWIDTH] IN BLOOD BY AUTOMATED COUNT: 54.9 FL (ref 35–45)
ERYTHROCYTE [DISTWIDTH] IN BLOOD BY AUTOMATED COUNT: 55.9 FL (ref 35–45)
ERYTHROCYTE [DISTWIDTH] IN BLOOD BY AUTOMATED COUNT: 56 FL (ref 35–45)
ERYTHROCYTE [DISTWIDTH] IN BLOOD BY AUTOMATED COUNT: 56.5 FL (ref 35–45)
ERYTHROCYTE [DISTWIDTH] IN BLOOD BY AUTOMATED COUNT: 57.7 FL (ref 35–45)
FOLATE: 11.3 NG/ML (ref 4.8–24.2)
GFR SERPL CREATININE-BSD FRML MDRD: 79 ML/MIN/1.73M2
GFR SERPL CREATININE-BSD FRML MDRD: > 90 ML/MIN/1.73M2
GLUCOSE BLD-MCNC: 108 MG/DL (ref 70–108)
GLUCOSE BLD-MCNC: 124 MG/DL (ref 70–108)
GLUCOSE BLD-MCNC: 75 MG/DL (ref 70–108)
GLUCOSE BLD-MCNC: 83 MG/DL (ref 70–108)
GLUCOSE BLD-MCNC: 86 MG/DL (ref 70–108)
GLUCOSE BLD-MCNC: 91 MG/DL (ref 70–108)
GLUCOSE BLD-MCNC: 94 MG/DL (ref 70–108)
GLUCOSE URINE: NEGATIVE MG/DL
GLUCOSE, URINE: NEGATIVE MG/DL
GLUCOSE, URINE: NEGATIVE MG/DL
HCT VFR BLD CALC: 32.7 % (ref 37–47)
HCT VFR BLD CALC: 33.4 % (ref 37–47)
HCT VFR BLD CALC: 33.8 % (ref 37–47)
HCT VFR BLD CALC: 34.2 % (ref 37–47)
HCT VFR BLD CALC: 34.5 % (ref 37–47)
HCT VFR BLD CALC: 38.6 % (ref 37–47)
HCT VFR BLD CALC: 41.2 % (ref 37–47)
HDLC SERPL-MCNC: 32 MG/DL
HEMOGLOBIN: 10 GM/DL (ref 12–16)
HEMOGLOBIN: 10.7 GM/DL (ref 12–16)
HEMOGLOBIN: 10.7 GM/DL (ref 12–16)
HEMOGLOBIN: 10.8 GM/DL (ref 12–16)
HEMOGLOBIN: 11 GM/DL (ref 12–16)
HEMOGLOBIN: 12.4 GM/DL (ref 12–16)
HEMOGLOBIN: 13.1 GM/DL (ref 12–16)
ICTOTEST: NEGATIVE
ICTOTEST: NEGATIVE
IMMATURE GRANS (ABS): 0.04 THOU/MM3 (ref 0–0.07)
IMMATURE GRANS (ABS): 0.05 THOU/MM3 (ref 0–0.07)
IMMATURE GRANS (ABS): 0.08 THOU/MM3 (ref 0–0.07)
IMMATURE GRANS (ABS): 0.15 THOU/MM3 (ref 0–0.07)
IMMATURE GRANS (ABS): 0.19 THOU/MM3 (ref 0–0.07)
IMMATURE GRANULOCYTES: 0.5 %
IMMATURE GRANULOCYTES: 0.6 %
IMMATURE GRANULOCYTES: 0.8 %
IMMATURE GRANULOCYTES: 0.9 %
IMMATURE GRANULOCYTES: 1.8 %
KETONES, URINE: 15
KETONES, URINE: 40
KETONES, URINE: NEGATIVE
LACTIC ACID, SEPSIS: 0.8 MMOL/L (ref 0.5–1.9)
LDL CHOLESTEROL CALCULATED: 63 MG/DL
LEUKOCYTE EST, POC: ABNORMAL
LEUKOCYTE EST, POC: ABNORMAL
LEUKOCYTE ESTERASE, URINE: NEGATIVE
LYMPHOCYTES # BLD: 12.4 %
LYMPHOCYTES # BLD: 13.2 %
LYMPHOCYTES # BLD: 13.8 %
LYMPHOCYTES # BLD: 6.3 %
LYMPHOCYTES # BLD: 8.4 %
LYMPHOCYTES ABSOLUTE: 0.9 THOU/MM3 (ref 1–4.8)
LYMPHOCYTES ABSOLUTE: 1 THOU/MM3 (ref 1–4.8)
LYMPHOCYTES ABSOLUTE: 1.1 THOU/MM3 (ref 1–4.8)
LYMPHOCYTES ABSOLUTE: 1.2 THOU/MM3 (ref 1–4.8)
LYMPHOCYTES ABSOLUTE: 1.4 THOU/MM3 (ref 1–4.8)
MAGNESIUM: 1.9 MG/DL (ref 1.6–2.4)
MAGNESIUM: 2.1 MG/DL (ref 1.6–2.4)
MAGNESIUM: 2.1 MG/DL (ref 1.6–2.4)
MCH RBC QN AUTO: 30.6 PG (ref 26–33)
MCH RBC QN AUTO: 30.8 PG (ref 26–33)
MCH RBC QN AUTO: 30.8 PG (ref 26–33)
MCH RBC QN AUTO: 31.1 PG (ref 26–33)
MCH RBC QN AUTO: 31.2 PG (ref 26–33)
MCH RBC QN AUTO: 31.2 PG (ref 26–33)
MCH RBC QN AUTO: 31.4 PG (ref 26–33)
MCHC RBC AUTO-ENTMCNC: 30.6 GM/DL (ref 32.2–35.5)
MCHC RBC AUTO-ENTMCNC: 31.3 GM/DL (ref 32.2–35.5)
MCHC RBC AUTO-ENTMCNC: 31.7 GM/DL (ref 32.2–35.5)
MCHC RBC AUTO-ENTMCNC: 31.8 GM/DL (ref 32.2–35.5)
MCHC RBC AUTO-ENTMCNC: 32 GM/DL (ref 32.2–35.5)
MCHC RBC AUTO-ENTMCNC: 32.1 GM/DL (ref 32.2–35.5)
MCHC RBC AUTO-ENTMCNC: 32.2 GM/DL (ref 32.2–35.5)
MCV RBC AUTO: 100 FL (ref 81–99)
MCV RBC AUTO: 96.6 FL (ref 81–99)
MCV RBC AUTO: 97.2 FL (ref 81–99)
MCV RBC AUTO: 97.4 FL (ref 81–99)
MCV RBC AUTO: 97.9 FL (ref 81–99)
MCV RBC AUTO: 98.1 FL (ref 81–99)
MCV RBC AUTO: 98.3 FL (ref 81–99)
MISCELLANEOUS LAB TEST RESULT: ABNORMAL
MONOCYTES # BLD: 12.4 %
MONOCYTES # BLD: 5.1 %
MONOCYTES # BLD: 7.5 %
MONOCYTES # BLD: 7.5 %
MONOCYTES # BLD: 7.6 %
MONOCYTES ABSOLUTE: 0.7 THOU/MM3 (ref 0.4–1.3)
MONOCYTES ABSOLUTE: 0.8 THOU/MM3 (ref 0.4–1.3)
MONOCYTES ABSOLUTE: 0.8 THOU/MM3 (ref 0.4–1.3)
MONOCYTES ABSOLUTE: 0.9 THOU/MM3 (ref 0.4–1.3)
MONOCYTES ABSOLUTE: 0.9 THOU/MM3 (ref 0.4–1.3)
MUCUS: ABNORMAL
NITRITE, URINE: NEGATIVE
NUCLEATED RED BLOOD CELLS: 0 /100 WBC
OSMOLALITY CALCULATION: 269.8 MOSMOL/KG (ref 275–300)
OSMOLALITY CALCULATION: 273 MOSMOL/KG (ref 275–300)
PH UA: 5.5 (ref 5–9)
PH UA: 6 (ref 5–9)
PH UA: 7.5 (ref 5–9)
PLATELET # BLD: 129 THOU/MM3 (ref 130–400)
PLATELET # BLD: 133 THOU/MM3 (ref 130–400)
PLATELET # BLD: 150 THOU/MM3 (ref 130–400)
PLATELET # BLD: 155 THOU/MM3 (ref 130–400)
PLATELET # BLD: 167 THOU/MM3 (ref 130–400)
PLATELET # BLD: 181 THOU/MM3 (ref 130–400)
PLATELET # BLD: 223 THOU/MM3 (ref 130–400)
PLATELET ESTIMATE: ADEQUATE
PMV BLD AUTO: 10.5 FL (ref 9.4–12.4)
PMV BLD AUTO: 10.7 FL (ref 9.4–12.4)
PMV BLD AUTO: 10.9 FL (ref 9.4–12.4)
PMV BLD AUTO: 10.9 FL (ref 9.4–12.4)
PMV BLD AUTO: 9.2 FL (ref 9.4–12.4)
PMV BLD AUTO: 9.6 FL (ref 9.4–12.4)
PMV BLD AUTO: 9.6 FL (ref 9.4–12.4)
POTASSIUM REFLEX MAGNESIUM: 4.3 MEQ/L (ref 3.5–5.2)
POTASSIUM SERPL-SCNC: 3.6 MEQ/L (ref 3.5–5.2)
POTASSIUM SERPL-SCNC: 3.6 MEQ/L (ref 3.5–5.2)
POTASSIUM SERPL-SCNC: 3.8 MEQ/L (ref 3.5–5.2)
POTASSIUM SERPL-SCNC: 3.8 MEQ/L (ref 3.5–5.2)
POTASSIUM SERPL-SCNC: 3.9 MEQ/L (ref 3.5–5.2)
POTASSIUM SERPL-SCNC: 4.1 MEQ/L (ref 3.5–5.2)
POTASSIUM SERPL-SCNC: 4.3 MEQ/L (ref 3.5–5.2)
PROTEIN UA: ABNORMAL MG/DL
PROTEIN UA: NEGATIVE
PROTEIN UA: NEGATIVE MG/DL
RBC # BLD: 3.27 MILL/MM3 (ref 4.2–5.4)
RBC # BLD: 3.41 MILL/MM3 (ref 4.2–5.4)
RBC # BLD: 3.47 MILL/MM3 (ref 4.2–5.4)
RBC # BLD: 3.51 MILL/MM3 (ref 4.2–5.4)
RBC # BLD: 3.54 MILL/MM3 (ref 4.2–5.4)
RBC # BLD: 3.97 MILL/MM3 (ref 4.2–5.4)
RBC # BLD: 4.2 MILL/MM3 (ref 4.2–5.4)
RBC URINE: ABNORMAL /HPF
RBC URINE: ABNORMAL /HPF
RENAL EPITHELIAL, UA: ABNORMAL
RENAL EPITHELIAL, UA: ABNORMAL
SARS-COV-2, NAAT: NOT DETECTED
SCAN OF BLOOD SMEAR: NORMAL
SEG NEUTROPHILS: 65.8 %
SEG NEUTROPHILS: 75.4 %
SEG NEUTROPHILS: 76 %
SEG NEUTROPHILS: 79.4 %
SEG NEUTROPHILS: 87.3 %
SEGMENTED NEUTROPHILS ABSOLUTE COUNT: 14.7 THOU/MM3 (ref 1.8–7.7)
SEGMENTED NEUTROPHILS ABSOLUTE COUNT: 4.8 THOU/MM3 (ref 1.8–7.7)
SEGMENTED NEUTROPHILS ABSOLUTE COUNT: 7.1 THOU/MM3 (ref 1.8–7.7)
SEGMENTED NEUTROPHILS ABSOLUTE COUNT: 7.6 THOU/MM3 (ref 1.8–7.7)
SEGMENTED NEUTROPHILS ABSOLUTE COUNT: 8.6 THOU/MM3 (ref 1.8–7.7)
SODIUM BLD-SCNC: 134 MEQ/L (ref 135–145)
SODIUM BLD-SCNC: 134 MEQ/L (ref 135–145)
SODIUM BLD-SCNC: 135 MEQ/L (ref 135–145)
SODIUM BLD-SCNC: 136 MEQ/L (ref 135–145)
SODIUM BLD-SCNC: 136 MEQ/L (ref 135–145)
SODIUM BLD-SCNC: 137 MEQ/L (ref 135–145)
SODIUM BLD-SCNC: 137 MEQ/L (ref 135–145)
SPECIFIC GRAVITY UA: 1.01 (ref 1–1.03)
SPECIFIC GRAVITY UA: 1.01 (ref 1–1.03)
SPECIFIC GRAVITY, URINE: >= 1.03 (ref 1–1.03)
TOTAL PROTEIN: 5.8 G/DL (ref 6.1–8)
TOTAL PROTEIN: 6.6 G/DL (ref 6.1–8)
TOTAL PROTEIN: 6.8 G/DL (ref 6.1–8)
TRIGL SERPL-MCNC: 62 MG/DL (ref 0–199)
TROPONIN T: 0.01 NG/ML
TROPONIN T: 0.01 NG/ML
TROPONIN T: 0.02 NG/ML
TSH SERPL DL<=0.05 MIU/L-ACNC: 1.89 UIU/ML (ref 0.4–4.2)
UROBILINOGEN, URINE: 1 EU/DL (ref 0–1)
UROBILINOGEN, URINE: 1 EU/DL (ref 0–1)
UROBILINOGEN, URINE: >= 8 EU/DL (ref 0–1)
VITAMIN B-12: 394 PG/ML (ref 211–911)
WBC # BLD: 10 THOU/MM3 (ref 4.8–10.8)
WBC # BLD: 10.1 THOU/MM3 (ref 4.8–10.8)
WBC # BLD: 10.8 THOU/MM3 (ref 4.8–10.8)
WBC # BLD: 16.8 THOU/MM3 (ref 4.8–10.8)
WBC # BLD: 7.2 THOU/MM3 (ref 4.8–10.8)
WBC # BLD: 7.3 THOU/MM3 (ref 4.8–10.8)
WBC # BLD: 9.4 THOU/MM3 (ref 4.8–10.8)
WBC UA: ABNORMAL /HPF
WBC UA: ABNORMAL /HPF
YEAST: ABNORMAL
YEAST: ABNORMAL

## 2021-01-01 PROCEDURE — 2580000003 HC RX 258: Performed by: INTERNAL MEDICINE

## 2021-01-01 PROCEDURE — 80048 BASIC METABOLIC PNL TOTAL CA: CPT

## 2021-01-01 PROCEDURE — 97530 THERAPEUTIC ACTIVITIES: CPT

## 2021-01-01 PROCEDURE — 99232 SBSQ HOSP IP/OBS MODERATE 35: CPT | Performed by: SURGERY

## 2021-01-01 PROCEDURE — 97110 THERAPEUTIC EXERCISES: CPT

## 2021-01-01 PROCEDURE — 6370000000 HC RX 637 (ALT 250 FOR IP): Performed by: PHYSICIAN ASSISTANT

## 2021-01-01 PROCEDURE — 2580000003 HC RX 258: Performed by: PHYSICIAN ASSISTANT

## 2021-01-01 PROCEDURE — 6370000000 HC RX 637 (ALT 250 FOR IP): Performed by: FAMILY MEDICINE

## 2021-01-01 PROCEDURE — 0QS13ZZ REPOSITION SACRUM, PERCUTANEOUS APPROACH: ICD-10-PCS | Performed by: PAIN MEDICINE

## 2021-01-01 PROCEDURE — 90471 IMMUNIZATION ADMIN: CPT | Performed by: PHYSICIAN ASSISTANT

## 2021-01-01 PROCEDURE — 2580000003 HC RX 258: Performed by: REGISTERED NURSE

## 2021-01-01 PROCEDURE — 82140 ASSAY OF AMMONIA: CPT

## 2021-01-01 PROCEDURE — 99285 EMERGENCY DEPT VISIT HI MDM: CPT

## 2021-01-01 PROCEDURE — 84484 ASSAY OF TROPONIN QUANT: CPT

## 2021-01-01 PROCEDURE — G0378 HOSPITAL OBSERVATION PER HR: HCPCS

## 2021-01-01 PROCEDURE — 94760 N-INVAS EAR/PLS OXIMETRY 1: CPT

## 2021-01-01 PROCEDURE — 6360000002 HC RX W HCPCS: Performed by: PHYSICIAN ASSISTANT

## 2021-01-01 PROCEDURE — 94640 AIRWAY INHALATION TREATMENT: CPT

## 2021-01-01 PROCEDURE — 3700000001 HC ADD 15 MINUTES (ANESTHESIA): Performed by: PAIN MEDICINE

## 2021-01-01 PROCEDURE — 6370000000 HC RX 637 (ALT 250 FOR IP): Performed by: NURSE PRACTITIONER

## 2021-01-01 PROCEDURE — 2709999900 HC NON-CHARGEABLE SUPPLY: Performed by: PAIN MEDICINE

## 2021-01-01 PROCEDURE — 83735 ASSAY OF MAGNESIUM: CPT

## 2021-01-01 PROCEDURE — 70450 CT HEAD/BRAIN W/O DYE: CPT

## 2021-01-01 PROCEDURE — 94761 N-INVAS EAR/PLS OXIMETRY MLT: CPT

## 2021-01-01 PROCEDURE — 7100000000 HC PACU RECOVERY - FIRST 15 MIN: Performed by: PAIN MEDICINE

## 2021-01-01 PROCEDURE — 82607 VITAMIN B-12: CPT

## 2021-01-01 PROCEDURE — APPSS60 APP SPLIT SHARED TIME 46-60 MINUTES: Performed by: PHYSICIAN ASSISTANT

## 2021-01-01 PROCEDURE — 72148 MRI LUMBAR SPINE W/O DYE: CPT

## 2021-01-01 PROCEDURE — 1200000000 HC SEMI PRIVATE

## 2021-01-01 PROCEDURE — 6370000000 HC RX 637 (ALT 250 FOR IP): Performed by: INTERNAL MEDICINE

## 2021-01-01 PROCEDURE — 85025 COMPLETE CBC W/AUTO DIFF WBC: CPT

## 2021-01-01 PROCEDURE — 72192 CT PELVIS W/O DYE: CPT

## 2021-01-01 PROCEDURE — 99232 SBSQ HOSP IP/OBS MODERATE 35: CPT | Performed by: NURSE PRACTITIONER

## 2021-01-01 PROCEDURE — 93005 ELECTROCARDIOGRAM TRACING: CPT | Performed by: NURSE PRACTITIONER

## 2021-01-01 PROCEDURE — 6370000000 HC RX 637 (ALT 250 FOR IP): Performed by: REGISTERED NURSE

## 2021-01-01 PROCEDURE — 96375 TX/PRO/DX INJ NEW DRUG ADDON: CPT

## 2021-01-01 PROCEDURE — 2140000000 HC CCU INTERMEDIATE R&B

## 2021-01-01 PROCEDURE — 72128 CT CHEST SPINE W/O DYE: CPT

## 2021-01-01 PROCEDURE — C9113 INJ PANTOPRAZOLE SODIUM, VIA: HCPCS | Performed by: REGISTERED NURSE

## 2021-01-01 PROCEDURE — APPSS45 APP SPLIT SHARED TIME 31-45 MINUTES: Performed by: PHYSICIAN ASSISTANT

## 2021-01-01 PROCEDURE — 2720000010 HC SURG SUPPLY STERILE: Performed by: PAIN MEDICINE

## 2021-01-01 PROCEDURE — 80053 COMPREHEN METABOLIC PANEL: CPT

## 2021-01-01 PROCEDURE — 97166 OT EVAL MOD COMPLEX 45 MIN: CPT

## 2021-01-01 PROCEDURE — 85027 COMPLETE CBC AUTOMATED: CPT

## 2021-01-01 PROCEDURE — 87635 SARS-COV-2 COVID-19 AMP PRB: CPT

## 2021-01-01 PROCEDURE — 97163 PT EVAL HIGH COMPLEX 45 MIN: CPT

## 2021-01-01 PROCEDURE — C1713 ANCHOR/SCREW BN/BN,TIS/BN: HCPCS | Performed by: PAIN MEDICINE

## 2021-01-01 PROCEDURE — 92523 SPEECH SOUND LANG COMPREHEN: CPT

## 2021-01-01 PROCEDURE — 36415 COLL VENOUS BLD VENIPUNCTURE: CPT

## 2021-01-01 PROCEDURE — 81001 URINALYSIS AUTO W/SCOPE: CPT

## 2021-01-01 PROCEDURE — 6360000002 HC RX W HCPCS: Performed by: EMERGENCY MEDICINE

## 2021-01-01 PROCEDURE — 6360000002 HC RX W HCPCS

## 2021-01-01 PROCEDURE — 6360000002 HC RX W HCPCS: Performed by: REGISTERED NURSE

## 2021-01-01 PROCEDURE — 97116 GAIT TRAINING THERAPY: CPT

## 2021-01-01 PROCEDURE — 74176 CT ABD & PELVIS W/O CONTRAST: CPT

## 2021-01-01 PROCEDURE — 6360000002 HC RX W HCPCS: Performed by: NURSE PRACTITIONER

## 2021-01-01 PROCEDURE — 6360000002 HC RX W HCPCS: Performed by: INTERNAL MEDICINE

## 2021-01-01 PROCEDURE — 80061 LIPID PANEL: CPT

## 2021-01-01 PROCEDURE — 88311 DECALCIFY TISSUE: CPT

## 2021-01-01 PROCEDURE — 90714 TD VACC NO PRESV 7 YRS+ IM: CPT | Performed by: PHYSICIAN ASSISTANT

## 2021-01-01 PROCEDURE — 7100000001 HC PACU RECOVERY - ADDTL 15 MIN: Performed by: PAIN MEDICINE

## 2021-01-01 PROCEDURE — C1894 INTRO/SHEATH, NON-LASER: HCPCS | Performed by: PAIN MEDICINE

## 2021-01-01 PROCEDURE — 99232 SBSQ HOSP IP/OBS MODERATE 35: CPT | Performed by: PHYSICIAN ASSISTANT

## 2021-01-01 PROCEDURE — APPSS30 APP SPLIT SHARED TIME 16-30 MINUTES: Performed by: PHYSICIAN ASSISTANT

## 2021-01-01 PROCEDURE — 99233 SBSQ HOSP IP/OBS HIGH 50: CPT | Performed by: NURSE PRACTITIONER

## 2021-01-01 PROCEDURE — 93005 ELECTROCARDIOGRAM TRACING: CPT | Performed by: PHYSICIAN ASSISTANT

## 2021-01-01 PROCEDURE — 6360000002 HC RX W HCPCS: Performed by: ANESTHESIOLOGY

## 2021-01-01 PROCEDURE — 96374 THER/PROPH/DIAG INJ IV PUSH: CPT

## 2021-01-01 PROCEDURE — 96376 TX/PRO/DX INJ SAME DRUG ADON: CPT

## 2021-01-01 PROCEDURE — 93005 ELECTROCARDIOGRAM TRACING: CPT | Performed by: INTERNAL MEDICINE

## 2021-01-01 PROCEDURE — 82746 ASSAY OF FOLIC ACID SERUM: CPT

## 2021-01-01 PROCEDURE — 12002 RPR S/N/AX/GEN/TRNK2.6-7.5CM: CPT

## 2021-01-01 PROCEDURE — 88305 TISSUE EXAM BY PATHOLOGIST: CPT

## 2021-01-01 PROCEDURE — 93306 TTE W/DOPPLER COMPLETE: CPT

## 2021-01-01 PROCEDURE — 82248 BILIRUBIN DIRECT: CPT

## 2021-01-01 PROCEDURE — 97129 THER IVNTJ 1ST 15 MIN: CPT

## 2021-01-01 PROCEDURE — 22511 PERQ LUMBOSACRAL INJECTION: CPT | Performed by: PAIN MEDICINE

## 2021-01-01 PROCEDURE — 99284 EMERGENCY DEPT VISIT MOD MDM: CPT

## 2021-01-01 PROCEDURE — 99223 1ST HOSP IP/OBS HIGH 75: CPT | Performed by: INTERNAL MEDICINE

## 2021-01-01 PROCEDURE — 83605 ASSAY OF LACTIC ACID: CPT

## 2021-01-01 PROCEDURE — 99305 1ST NF CARE MODERATE MDM 35: CPT | Performed by: FAMILY MEDICINE

## 2021-01-01 PROCEDURE — 71045 X-RAY EXAM CHEST 1 VIEW: CPT

## 2021-01-01 PROCEDURE — 3600000004 HC SURGERY LEVEL 4 BASE: Performed by: PAIN MEDICINE

## 2021-01-01 PROCEDURE — 93010 ELECTROCARDIOGRAM REPORT: CPT | Performed by: INTERNAL MEDICINE

## 2021-01-01 PROCEDURE — 97162 PT EVAL MOD COMPLEX 30 MIN: CPT

## 2021-01-01 PROCEDURE — 84443 ASSAY THYROID STIM HORMONE: CPT

## 2021-01-01 PROCEDURE — 6820000001 HC L2 TRAUMA SURGERY EVALUATION: Performed by: SURGERY

## 2021-01-01 PROCEDURE — 84132 ASSAY OF SERUM POTASSIUM: CPT

## 2021-01-01 PROCEDURE — 99222 1ST HOSP IP/OBS MODERATE 55: CPT | Performed by: NURSE PRACTITIONER

## 2021-01-01 PROCEDURE — 2500000003 HC RX 250 WO HCPCS: Performed by: ANESTHESIOLOGY

## 2021-01-01 PROCEDURE — 72125 CT NECK SPINE W/O DYE: CPT

## 2021-01-01 PROCEDURE — 97535 SELF CARE MNGMENT TRAINING: CPT

## 2021-01-01 PROCEDURE — 99222 1ST HOSP IP/OBS MODERATE 55: CPT | Performed by: SURGERY

## 2021-01-01 PROCEDURE — 3700000000 HC ANESTHESIA ATTENDED CARE: Performed by: PAIN MEDICINE

## 2021-01-01 PROCEDURE — APPSS180 APP SPLIT SHARED TIME > 60 MINUTES: Performed by: PHYSICIAN ASSISTANT

## 2021-01-01 PROCEDURE — 2500000003 HC RX 250 WO HCPCS: Performed by: PAIN MEDICINE

## 2021-01-01 PROCEDURE — 99309 SBSQ NF CARE MODERATE MDM 30: CPT | Performed by: NURSE PRACTITIONER

## 2021-01-01 PROCEDURE — APPSS60 APP SPLIT SHARED TIME 46-60 MINUTES: Performed by: NURSE PRACTITIONER

## 2021-01-01 PROCEDURE — 3209999900 FLUORO FOR SURGICAL PROCEDURES

## 2021-01-01 PROCEDURE — 93005 ELECTROCARDIOGRAM TRACING: CPT | Performed by: EMERGENCY MEDICINE

## 2021-01-01 PROCEDURE — 99233 SBSQ HOSP IP/OBS HIGH 50: CPT | Performed by: INTERNAL MEDICINE

## 2021-01-01 PROCEDURE — 0QU13JZ SUPPLEMENT SACRUM WITH SYNTHETIC SUBSTITUTE, PERCUTANEOUS APPROACH: ICD-10-PCS | Performed by: PAIN MEDICINE

## 2021-01-01 PROCEDURE — 81003 URINALYSIS AUTO W/O SCOPE: CPT

## 2021-01-01 PROCEDURE — 3600000014 HC SURGERY LEVEL 4 ADDTL 15MIN: Performed by: PAIN MEDICINE

## 2021-01-01 PROCEDURE — 0QB10ZX EXCISION OF SACRUM, OPEN APPROACH, DIAGNOSTIC: ICD-10-PCS | Performed by: PAIN MEDICINE

## 2021-01-01 PROCEDURE — 2500000003 HC RX 250 WO HCPCS

## 2021-01-01 PROCEDURE — 72131 CT LUMBAR SPINE W/O DYE: CPT

## 2021-01-01 DEVICE — BONE CEMENT CX01A XPEDE US
Type: IMPLANTABLE DEVICE | Status: FUNCTIONAL
Brand: KYPHON® XPEDE™ BONE CEMENT

## 2021-01-01 RX ORDER — DOCUSATE SODIUM 100 MG/1
100 CAPSULE, LIQUID FILLED ORAL 2 TIMES DAILY
Status: DISCONTINUED | OUTPATIENT
Start: 2021-01-01 | End: 2021-01-01 | Stop reason: HOSPADM

## 2021-01-01 RX ORDER — ASPIRIN 81 MG/1
81 TABLET ORAL DAILY
Qty: 30 TABLET | Refills: 3 | DISCHARGE
Start: 2021-01-01

## 2021-01-01 RX ORDER — HYDROCODONE BITARTRATE AND ACETAMINOPHEN 5; 325 MG/1; MG/1
1 TABLET ORAL EVERY 6 HOURS PRN
Status: DISCONTINUED | OUTPATIENT
Start: 2021-01-01 | End: 2021-01-01

## 2021-01-01 RX ORDER — FENTANYL CITRATE 50 UG/ML
25 INJECTION, SOLUTION INTRAMUSCULAR; INTRAVENOUS ONCE
Status: COMPLETED | OUTPATIENT
Start: 2021-01-01 | End: 2021-01-01

## 2021-01-01 RX ORDER — QUETIAPINE FUMARATE 25 MG/1
25 TABLET, FILM COATED ORAL NIGHTLY
Qty: 60 TABLET | Refills: 3 | DISCHARGE
Start: 2021-01-01

## 2021-01-01 RX ORDER — ROCURONIUM BROMIDE 10 MG/ML
INJECTION, SOLUTION INTRAVENOUS PRN
Status: DISCONTINUED | OUTPATIENT
Start: 2021-01-01 | End: 2021-01-01 | Stop reason: SDUPTHER

## 2021-01-01 RX ORDER — PANTOPRAZOLE SODIUM 40 MG/1
40 TABLET, DELAYED RELEASE ORAL
Status: DISCONTINUED | OUTPATIENT
Start: 2021-01-01 | End: 2021-01-01 | Stop reason: HOSPADM

## 2021-01-01 RX ORDER — LIDOCAINE 4 G/G
1 PATCH TOPICAL DAILY
Status: DISCONTINUED | OUTPATIENT
Start: 2021-01-01 | End: 2021-01-01

## 2021-01-01 RX ORDER — IPRATROPIUM BROMIDE AND ALBUTEROL SULFATE 2.5; .5 MG/3ML; MG/3ML
1 SOLUTION RESPIRATORY (INHALATION)
Status: DISCONTINUED | OUTPATIENT
Start: 2021-01-01 | End: 2021-01-01 | Stop reason: HOSPADM

## 2021-01-01 RX ORDER — QUETIAPINE FUMARATE 25 MG/1
25 TABLET, FILM COATED ORAL 2 TIMES DAILY
Status: DISCONTINUED | OUTPATIENT
Start: 2021-01-01 | End: 2021-01-01

## 2021-01-01 RX ORDER — AMLODIPINE BESYLATE 5 MG/1
5 TABLET ORAL DAILY
Status: DISCONTINUED | OUTPATIENT
Start: 2021-01-01 | End: 2021-01-01

## 2021-01-01 RX ORDER — POTASSIUM CHLORIDE 20 MEQ/1
40 TABLET, EXTENDED RELEASE ORAL PRN
Status: DISCONTINUED | OUTPATIENT
Start: 2021-01-01 | End: 2021-01-01

## 2021-01-01 RX ORDER — AMLODIPINE BESYLATE 5 MG/1
5 TABLET ORAL ONCE
Status: COMPLETED | OUTPATIENT
Start: 2021-01-01 | End: 2021-01-01

## 2021-01-01 RX ORDER — NITROFURANTOIN 25; 75 MG/1; MG/1
100 CAPSULE ORAL EVERY 12 HOURS SCHEDULED
Status: DISCONTINUED | OUTPATIENT
Start: 2021-01-01 | End: 2021-01-01 | Stop reason: HOSPADM

## 2021-01-01 RX ORDER — LIDOCAINE HYDROCHLORIDE AND EPINEPHRINE 10; 10 MG/ML; UG/ML
INJECTION, SOLUTION INFILTRATION; PERINEURAL
Status: COMPLETED
Start: 2021-01-01 | End: 2021-01-01

## 2021-01-01 RX ORDER — BACITRACIN, NEOMYCIN, POLYMYXIN B 400; 3.5; 5 [USP'U]/G; MG/G; [USP'U]/G
OINTMENT TOPICAL 3 TIMES DAILY
Status: DISCONTINUED | OUTPATIENT
Start: 2021-01-01 | End: 2021-01-01 | Stop reason: HOSPADM

## 2021-01-01 RX ORDER — OXYCODONE HYDROCHLORIDE 5 MG/1
2.5 TABLET ORAL EVERY 4 HOURS PRN
Status: DISCONTINUED | OUTPATIENT
Start: 2021-01-01 | End: 2021-01-01 | Stop reason: HOSPADM

## 2021-01-01 RX ORDER — SENNA PLUS 8.6 MG/1
2 TABLET ORAL NIGHTLY
Status: DISCONTINUED | OUTPATIENT
Start: 2021-01-01 | End: 2021-01-01 | Stop reason: HOSPADM

## 2021-01-01 RX ORDER — FERROUS SULFATE 325(65) MG
325 TABLET ORAL 2 TIMES DAILY
Status: DISCONTINUED | OUTPATIENT
Start: 2021-01-01 | End: 2021-01-01 | Stop reason: HOSPADM

## 2021-01-01 RX ORDER — HYDRALAZINE HYDROCHLORIDE 20 MG/ML
5 INJECTION INTRAMUSCULAR; INTRAVENOUS EVERY 10 MIN PRN
Status: DISCONTINUED | OUTPATIENT
Start: 2021-01-01 | End: 2021-01-01 | Stop reason: HOSPADM

## 2021-01-01 RX ORDER — LABETALOL 20 MG/4 ML (5 MG/ML) INTRAVENOUS SYRINGE
5 4 TIMES DAILY PRN
Status: DISCONTINUED | OUTPATIENT
Start: 2021-01-01 | End: 2021-01-01 | Stop reason: HOSPADM

## 2021-01-01 RX ORDER — AMOXICILLIN AND CLAVULANATE POTASSIUM 875; 125 MG/1; MG/1
1 TABLET, FILM COATED ORAL EVERY 12 HOURS
Status: COMPLETED | OUTPATIENT
Start: 2021-01-01 | End: 2021-01-01

## 2021-01-01 RX ORDER — ACETAMINOPHEN 500 MG
500 TABLET ORAL EVERY 8 HOURS SCHEDULED
Status: DISCONTINUED | OUTPATIENT
Start: 2021-01-01 | End: 2021-01-01 | Stop reason: HOSPADM

## 2021-01-01 RX ORDER — MAGNESIUM SULFATE IN WATER 40 MG/ML
2000 INJECTION, SOLUTION INTRAVENOUS PRN
Status: DISCONTINUED | OUTPATIENT
Start: 2021-01-01 | End: 2021-01-01 | Stop reason: HOSPADM

## 2021-01-01 RX ORDER — ACETAMINOPHEN 650 MG/1
650 SUPPOSITORY RECTAL EVERY 6 HOURS PRN
Status: DISCONTINUED | OUTPATIENT
Start: 2021-01-01 | End: 2021-01-01

## 2021-01-01 RX ORDER — 0.9 % SODIUM CHLORIDE 0.9 %
1000 INTRAVENOUS SOLUTION INTRAVENOUS ONCE
Status: COMPLETED | OUTPATIENT
Start: 2021-01-01 | End: 2021-01-01

## 2021-01-01 RX ORDER — ONDANSETRON 2 MG/ML
4 INJECTION INTRAMUSCULAR; INTRAVENOUS
Status: DISCONTINUED | OUTPATIENT
Start: 2021-01-01 | End: 2021-01-01 | Stop reason: HOSPADM

## 2021-01-01 RX ORDER — DONEPEZIL HYDROCHLORIDE 5 MG/1
5 TABLET, FILM COATED ORAL
Status: DISCONTINUED | OUTPATIENT
Start: 2021-01-01 | End: 2021-01-01 | Stop reason: HOSPADM

## 2021-01-01 RX ORDER — PROMETHAZINE HYDROCHLORIDE 25 MG/1
12.5 TABLET ORAL EVERY 6 HOURS PRN
Status: DISCONTINUED | OUTPATIENT
Start: 2021-01-01 | End: 2021-01-01 | Stop reason: HOSPADM

## 2021-01-01 RX ORDER — HYDRALAZINE HYDROCHLORIDE 25 MG/1
25 TABLET, FILM COATED ORAL 3 TIMES DAILY
Status: ON HOLD | COMMUNITY
End: 2021-01-01 | Stop reason: HOSPADM

## 2021-01-01 RX ORDER — MAGNESIUM SULFATE HEPTAHYDRATE 500 MG/ML
1000 INJECTION, SOLUTION INTRAMUSCULAR; INTRAVENOUS ONCE
Status: DISCONTINUED | OUTPATIENT
Start: 2021-01-01 | End: 2021-01-01 | Stop reason: SDUPTHER

## 2021-01-01 RX ORDER — SENNA PLUS 8.6 MG/1
2 TABLET ORAL NIGHTLY
Qty: 60 TABLET | Refills: 0 | DISCHARGE
Start: 2021-01-01 | End: 2021-01-01

## 2021-01-01 RX ORDER — POLYETHYLENE GLYCOL 3350 17 G/17G
17 POWDER, FOR SOLUTION ORAL DAILY PRN
Status: DISCONTINUED | OUTPATIENT
Start: 2021-01-01 | End: 2021-01-01 | Stop reason: HOSPADM

## 2021-01-01 RX ORDER — ACETAMINOPHEN 325 MG/1
650 TABLET ORAL EVERY 4 HOURS PRN
Status: DISCONTINUED | OUTPATIENT
Start: 2021-01-01 | End: 2021-01-01

## 2021-01-01 RX ORDER — QUETIAPINE FUMARATE 25 MG/1
25 TABLET, FILM COATED ORAL 2 TIMES DAILY
Status: DISCONTINUED | OUTPATIENT
Start: 2021-01-01 | End: 2021-01-01 | Stop reason: HOSPADM

## 2021-01-01 RX ORDER — PANTOPRAZOLE SODIUM 40 MG/10ML
40 INJECTION, POWDER, LYOPHILIZED, FOR SOLUTION INTRAVENOUS DAILY
Status: DISCONTINUED | OUTPATIENT
Start: 2021-01-01 | End: 2021-01-01

## 2021-01-01 RX ORDER — CEFAZOLIN SODIUM 1 G/3ML
INJECTION, POWDER, FOR SOLUTION INTRAMUSCULAR; INTRAVENOUS PRN
Status: DISCONTINUED | OUTPATIENT
Start: 2021-01-01 | End: 2021-01-01 | Stop reason: SDUPTHER

## 2021-01-01 RX ORDER — DIPHENHYDRAMINE HYDROCHLORIDE 50 MG/ML
25 INJECTION INTRAMUSCULAR; INTRAVENOUS EVERY 6 HOURS PRN
Status: DISCONTINUED | OUTPATIENT
Start: 2021-01-01 | End: 2021-01-01 | Stop reason: HOSPADM

## 2021-01-01 RX ORDER — DIPHENHYDRAMINE HYDROCHLORIDE 50 MG/ML
INJECTION INTRAMUSCULAR; INTRAVENOUS
Status: COMPLETED
Start: 2021-01-01 | End: 2021-01-01

## 2021-01-01 RX ORDER — TRAMADOL HYDROCHLORIDE 50 MG/1
50 TABLET ORAL EVERY 6 HOURS PRN
Status: DISCONTINUED | OUTPATIENT
Start: 2021-01-01 | End: 2021-01-01 | Stop reason: HOSPADM

## 2021-01-01 RX ORDER — OXYCODONE HYDROCHLORIDE 5 MG/1
5 TABLET ORAL EVERY 4 HOURS PRN
Qty: 12 TABLET | Refills: 0 | Status: SHIPPED | DISCHARGE
Start: 2021-01-01 | End: 2021-01-01

## 2021-01-01 RX ORDER — LIDOCAINE HCL/PF 100 MG/5ML
SYRINGE (ML) INJECTION PRN
Status: DISCONTINUED | OUTPATIENT
Start: 2021-01-01 | End: 2021-01-01 | Stop reason: SDUPTHER

## 2021-01-01 RX ORDER — HYDROCODONE BITARTRATE AND ACETAMINOPHEN 5; 325 MG/1; MG/1
2 TABLET ORAL EVERY 4 HOURS PRN
Status: DISCONTINUED | OUTPATIENT
Start: 2021-01-01 | End: 2021-01-01

## 2021-01-01 RX ORDER — TRAMADOL HYDROCHLORIDE 50 MG/1
50 TABLET ORAL EVERY 6 HOURS PRN
Status: DISCONTINUED | OUTPATIENT
Start: 2021-01-01 | End: 2021-01-01

## 2021-01-01 RX ORDER — TRAMADOL HYDROCHLORIDE 50 MG/1
25-50 TABLET ORAL EVERY 6 HOURS PRN
Refills: 0 | Status: SHIPPED | DISCHARGE
Start: 2021-01-01 | End: 2021-01-01

## 2021-01-01 RX ORDER — SODIUM CHLORIDE 9 MG/ML
10 INJECTION INTRAVENOUS DAILY
Status: DISCONTINUED | OUTPATIENT
Start: 2021-01-01 | End: 2021-01-01

## 2021-01-01 RX ORDER — DOCUSATE SODIUM 100 MG/1
100 CAPSULE, LIQUID FILLED ORAL DAILY
Status: DISCONTINUED | OUTPATIENT
Start: 2021-01-01 | End: 2021-01-01

## 2021-01-01 RX ORDER — FENTANYL CITRATE 50 UG/ML
50 INJECTION, SOLUTION INTRAMUSCULAR; INTRAVENOUS EVERY 5 MIN PRN
Status: DISCONTINUED | OUTPATIENT
Start: 2021-01-01 | End: 2021-01-01 | Stop reason: HOSPADM

## 2021-01-01 RX ORDER — SODIUM CHLORIDE 0.9 % (FLUSH) 0.9 %
10 SYRINGE (ML) INJECTION EVERY 12 HOURS SCHEDULED
Status: DISCONTINUED | OUTPATIENT
Start: 2021-01-01 | End: 2021-01-01 | Stop reason: HOSPADM

## 2021-01-01 RX ORDER — SODIUM CHLORIDE 0.9 % (FLUSH) 0.9 %
10 SYRINGE (ML) INJECTION PRN
Status: DISCONTINUED | OUTPATIENT
Start: 2021-01-01 | End: 2021-01-01 | Stop reason: HOSPADM

## 2021-01-01 RX ORDER — POLYETHYLENE GLYCOL 3350 17 G/17G
17 POWDER, FOR SOLUTION ORAL DAILY PRN
Qty: 527 G | Refills: 1 | DISCHARGE
Start: 2021-01-01 | End: 2021-01-01

## 2021-01-01 RX ORDER — ASPIRIN 81 MG/1
81 TABLET ORAL DAILY
Status: DISCONTINUED | OUTPATIENT
Start: 2021-01-01 | End: 2021-01-01 | Stop reason: HOSPADM

## 2021-01-01 RX ORDER — AMLODIPINE BESYLATE 2.5 MG/1
2.5 TABLET ORAL DAILY
Status: DISCONTINUED | OUTPATIENT
Start: 2021-01-01 | End: 2021-01-01 | Stop reason: HOSPADM

## 2021-01-01 RX ORDER — TRAZODONE HYDROCHLORIDE 50 MG/1
25 TABLET ORAL NIGHTLY
Status: DISCONTINUED | OUTPATIENT
Start: 2021-01-01 | End: 2021-01-01

## 2021-01-01 RX ORDER — CLONIDINE HYDROCHLORIDE 0.1 MG/1
0.1 TABLET ORAL EVERY 8 HOURS PRN
Qty: 60 TABLET | Refills: 3 | DISCHARGE
Start: 2021-01-01

## 2021-01-01 RX ORDER — PROPOFOL 10 MG/ML
INJECTION, EMULSION INTRAVENOUS PRN
Status: DISCONTINUED | OUTPATIENT
Start: 2021-01-01 | End: 2021-01-01 | Stop reason: SDUPTHER

## 2021-01-01 RX ORDER — HYDRALAZINE HYDROCHLORIDE 25 MG/1
75 TABLET, FILM COATED ORAL EVERY 8 HOURS
Qty: 90 TABLET | Refills: 3 | DISCHARGE
Start: 2021-01-01

## 2021-01-01 RX ORDER — TRAZODONE HYDROCHLORIDE 50 MG/1
25 TABLET ORAL NIGHTLY PRN
Status: DISCONTINUED | OUTPATIENT
Start: 2021-01-01 | End: 2021-01-01 | Stop reason: HOSPADM

## 2021-01-01 RX ORDER — TRAZODONE HYDROCHLORIDE 50 MG/1
25 TABLET ORAL NIGHTLY PRN
DISCHARGE
Start: 2021-01-01

## 2021-01-01 RX ORDER — CLONIDINE HYDROCHLORIDE 0.1 MG/1
0.1 TABLET ORAL EVERY 8 HOURS
Status: DISCONTINUED | OUTPATIENT
Start: 2021-01-01 | End: 2021-01-01

## 2021-01-01 RX ORDER — NITROFURANTOIN 25; 75 MG/1; MG/1
100 CAPSULE ORAL EVERY 12 HOURS SCHEDULED
Qty: 20 CAPSULE | Refills: 0 | DISCHARGE
Start: 2021-01-01 | End: 2021-01-01

## 2021-01-01 RX ORDER — LIDOCAINE 4 G/G
3 PATCH TOPICAL DAILY
DISCHARGE
Start: 2021-01-01

## 2021-01-01 RX ORDER — DONEPEZIL HYDROCHLORIDE 5 MG/1
5 TABLET, FILM COATED ORAL
Qty: 30 TABLET | Refills: 3 | DISCHARGE
Start: 2021-01-01

## 2021-01-01 RX ORDER — SULFAMETHOXAZOLE AND TRIMETHOPRIM 800; 160 MG/1; MG/1
1 TABLET ORAL EVERY 12 HOURS
Status: DISCONTINUED | OUTPATIENT
Start: 2021-01-01 | End: 2021-01-01

## 2021-01-01 RX ORDER — HYDROCODONE BITARTRATE AND ACETAMINOPHEN 5; 325 MG/1; MG/1
2 TABLET ORAL 2 TIMES DAILY
Status: ON HOLD | COMMUNITY
End: 2021-01-01 | Stop reason: HOSPADM

## 2021-01-01 RX ORDER — CLONIDINE HYDROCHLORIDE 0.1 MG/1
0.1 TABLET ORAL EVERY 8 HOURS PRN
Status: DISCONTINUED | OUTPATIENT
Start: 2021-01-01 | End: 2021-01-01 | Stop reason: HOSPADM

## 2021-01-01 RX ORDER — SULFAMETHOXAZOLE AND TRIMETHOPRIM 800; 160 MG/1; MG/1
1 TABLET ORAL EVERY 12 HOURS
Status: ON HOLD | COMMUNITY
Start: 2021-01-01 | End: 2021-01-01 | Stop reason: HOSPADM

## 2021-01-01 RX ORDER — HYDROCODONE BITARTRATE AND ACETAMINOPHEN 5; 325 MG/1; MG/1
1 TABLET ORAL EVERY 4 HOURS PRN
Status: DISCONTINUED | OUTPATIENT
Start: 2021-01-01 | End: 2021-01-01

## 2021-01-01 RX ORDER — BACITRACIN, NEOMYCIN, POLYMYXIN B 400; 3.5; 5 [USP'U]/G; MG/G; [USP'U]/G
OINTMENT TOPICAL
DISCHARGE
Start: 2021-01-01 | End: 2021-01-01

## 2021-01-01 RX ORDER — MEPERIDINE HYDROCHLORIDE 25 MG/ML
12.5 INJECTION INTRAMUSCULAR; INTRAVENOUS; SUBCUTANEOUS EVERY 5 MIN PRN
Status: DISCONTINUED | OUTPATIENT
Start: 2021-01-01 | End: 2021-01-01 | Stop reason: HOSPADM

## 2021-01-01 RX ORDER — DIPHENHYDRAMINE HYDROCHLORIDE 50 MG/ML
12.5 INJECTION INTRAMUSCULAR; INTRAVENOUS
Status: DISCONTINUED | OUTPATIENT
Start: 2021-01-01 | End: 2021-01-01 | Stop reason: HOSPADM

## 2021-01-01 RX ORDER — LIDOCAINE HYDROCHLORIDE 10 MG/ML
INJECTION, SOLUTION EPIDURAL; INFILTRATION; INTRACAUDAL; PERINEURAL
Status: DISPENSED
Start: 2021-01-01 | End: 2021-01-01

## 2021-01-01 RX ORDER — POTASSIUM CHLORIDE 20 MEQ/1
40 TABLET, EXTENDED RELEASE ORAL PRN
Status: DISCONTINUED | OUTPATIENT
Start: 2021-01-01 | End: 2021-01-01 | Stop reason: HOSPADM

## 2021-01-01 RX ORDER — TRAMADOL HYDROCHLORIDE 50 MG/1
25-50 TABLET ORAL EVERY 6 HOURS PRN
Refills: 0 | Status: SHIPPED | OUTPATIENT
Start: 2021-01-01 | End: 2021-01-01

## 2021-01-01 RX ORDER — ACETAMINOPHEN 325 MG/1
650 TABLET ORAL EVERY 6 HOURS SCHEDULED
Status: DISCONTINUED | OUTPATIENT
Start: 2021-01-01 | End: 2021-01-01 | Stop reason: HOSPADM

## 2021-01-01 RX ORDER — SODIUM CHLORIDE 9 MG/ML
INJECTION, SOLUTION INTRAVENOUS CONTINUOUS
Status: DISCONTINUED | OUTPATIENT
Start: 2021-01-01 | End: 2021-01-01

## 2021-01-01 RX ORDER — ONDANSETRON 2 MG/ML
4 INJECTION INTRAMUSCULAR; INTRAVENOUS ONCE
Status: COMPLETED | OUTPATIENT
Start: 2021-01-01 | End: 2021-01-01

## 2021-01-01 RX ORDER — POTASSIUM CHLORIDE 7.45 MG/ML
10 INJECTION INTRAVENOUS PRN
Status: DISCONTINUED | OUTPATIENT
Start: 2021-01-01 | End: 2021-01-01 | Stop reason: HOSPADM

## 2021-01-01 RX ORDER — CLONIDINE HYDROCHLORIDE 0.1 MG/1
0.1 TABLET ORAL ONCE
Status: COMPLETED | OUTPATIENT
Start: 2021-01-01 | End: 2021-01-01

## 2021-01-01 RX ORDER — OXYCODONE HYDROCHLORIDE 5 MG/1
5 TABLET ORAL EVERY 4 HOURS PRN
Status: DISCONTINUED | OUTPATIENT
Start: 2021-01-01 | End: 2021-01-01 | Stop reason: HOSPADM

## 2021-01-01 RX ORDER — ONDANSETRON 2 MG/ML
4 INJECTION INTRAMUSCULAR; INTRAVENOUS EVERY 6 HOURS PRN
Status: DISCONTINUED | OUTPATIENT
Start: 2021-01-01 | End: 2021-01-01 | Stop reason: HOSPADM

## 2021-01-01 RX ORDER — ACETAMINOPHEN 325 MG/1
650 TABLET ORAL EVERY 6 HOURS PRN
Status: DISCONTINUED | OUTPATIENT
Start: 2021-01-01 | End: 2021-01-01

## 2021-01-01 RX ORDER — QUETIAPINE FUMARATE 25 MG/1
25 TABLET, FILM COATED ORAL NIGHTLY
Status: DISCONTINUED | OUTPATIENT
Start: 2021-01-01 | End: 2021-01-01 | Stop reason: HOSPADM

## 2021-01-01 RX ORDER — ACETAMINOPHEN 500 MG
500 TABLET ORAL EVERY 6 HOURS PRN
Status: DISCONTINUED | OUTPATIENT
Start: 2021-01-01 | End: 2021-01-01 | Stop reason: HOSPADM

## 2021-01-01 RX ORDER — MORPHINE SULFATE 4 MG/ML
4 INJECTION, SOLUTION INTRAMUSCULAR; INTRAVENOUS
Status: DISCONTINUED | OUTPATIENT
Start: 2021-01-01 | End: 2021-01-01

## 2021-01-01 RX ORDER — LIDOCAINE 4 G/G
3 PATCH TOPICAL DAILY
Status: DISCONTINUED | OUTPATIENT
Start: 2021-01-01 | End: 2021-01-01 | Stop reason: HOSPADM

## 2021-01-01 RX ORDER — POTASSIUM CHLORIDE 7.45 MG/ML
10 INJECTION INTRAVENOUS PRN
Status: DISCONTINUED | OUTPATIENT
Start: 2021-01-01 | End: 2021-01-01

## 2021-01-01 RX ORDER — BUPIVACAINE HYDROCHLORIDE 5 MG/ML
INJECTION, SOLUTION PERINEURAL PRN
Status: DISCONTINUED | OUTPATIENT
Start: 2021-01-01 | End: 2021-01-01 | Stop reason: HOSPADM

## 2021-01-01 RX ORDER — QUETIAPINE FUMARATE 25 MG/1
25 TABLET, FILM COATED ORAL 2 TIMES DAILY
Qty: 60 TABLET | Refills: 3 | Status: ON HOLD | DISCHARGE
Start: 2021-01-01 | End: 2021-01-01 | Stop reason: HOSPADM

## 2021-01-01 RX ORDER — TRAMADOL HYDROCHLORIDE 50 MG/1
25 TABLET ORAL EVERY 6 HOURS PRN
Status: DISCONTINUED | OUTPATIENT
Start: 2021-01-01 | End: 2021-01-01 | Stop reason: HOSPADM

## 2021-01-01 RX ORDER — ONDANSETRON 4 MG/1
4 TABLET, FILM COATED ORAL EVERY 8 HOURS PRN
COMMUNITY

## 2021-01-01 RX ORDER — AMLODIPINE BESYLATE 2.5 MG/1
2.5 TABLET ORAL DAILY
Qty: 30 TABLET | Refills: 3 | DISCHARGE
Start: 2021-01-01

## 2021-01-01 RX ORDER — LIDOCAINE 4 G/G
3 PATCH TOPICAL DAILY
Status: DISCONTINUED | OUTPATIENT
Start: 2021-01-01 | End: 2021-01-01

## 2021-01-01 RX ORDER — HYDROCODONE BITARTRATE AND ACETAMINOPHEN 5; 325 MG/1; MG/1
1 TABLET ORAL EVERY 6 HOURS PRN
Status: ON HOLD | COMMUNITY
End: 2021-01-01 | Stop reason: HOSPADM

## 2021-01-01 RX ORDER — PSEUDOEPHEDRINE HCL 30 MG
100 TABLET ORAL 2 TIMES DAILY
DISCHARGE
Start: 2021-01-01

## 2021-01-01 RX ORDER — MORPHINE SULFATE 2 MG/ML
2 INJECTION, SOLUTION INTRAMUSCULAR; INTRAVENOUS
Status: DISCONTINUED | OUTPATIENT
Start: 2021-01-01 | End: 2021-01-01

## 2021-01-01 RX ORDER — OXYCODONE HYDROCHLORIDE AND ACETAMINOPHEN 5; 325 MG/1; MG/1
1 TABLET ORAL ONCE
Status: COMPLETED | OUTPATIENT
Start: 2021-01-01 | End: 2021-01-01

## 2021-01-01 RX ORDER — NUT TX, LACT-REDUCED, IRON 0.09G-2.25
LIQUID (ML) ORAL 2 TIMES DAILY
COMMUNITY

## 2021-01-01 RX ORDER — TRAMADOL HYDROCHLORIDE 50 MG/1
25 TABLET ORAL EVERY 6 HOURS PRN
Status: DISCONTINUED | OUTPATIENT
Start: 2021-01-01 | End: 2021-01-01

## 2021-01-01 RX ORDER — HYDROCODONE BITARTRATE AND ACETAMINOPHEN 5; 325 MG/1; MG/1
0.5 TABLET ORAL EVERY 4 HOURS PRN
Status: DISCONTINUED | OUTPATIENT
Start: 2021-01-01 | End: 2021-01-01

## 2021-01-01 RX ADMIN — ACETAMINOPHEN 650 MG: 325 TABLET ORAL at 08:06

## 2021-01-01 RX ADMIN — SODIUM CHLORIDE, PRESERVATIVE FREE 10 ML: 5 INJECTION INTRAVENOUS at 10:24

## 2021-01-01 RX ADMIN — ACETAMINOPHEN 650 MG: 325 TABLET ORAL at 16:57

## 2021-01-01 RX ADMIN — FERROUS SULFATE TAB 325 MG (65 MG ELEMENTAL FE) 325 MG: 325 (65 FE) TAB at 09:50

## 2021-01-01 RX ADMIN — SODIUM CHLORIDE, PRESERVATIVE FREE 10 ML: 5 INJECTION INTRAVENOUS at 20:31

## 2021-01-01 RX ADMIN — DOCUSATE SODIUM 100 MG: 100 CAPSULE, LIQUID FILLED ORAL at 09:50

## 2021-01-01 RX ADMIN — FERROUS SULFATE TAB 325 MG (65 MG ELEMENTAL FE) 325 MG: 325 (65 FE) TAB at 09:39

## 2021-01-01 RX ADMIN — TRAZODONE HYDROCHLORIDE 25 MG: 50 TABLET ORAL at 20:29

## 2021-01-01 RX ADMIN — DOCUSATE SODIUM 100 MG: 100 CAPSULE, LIQUID FILLED ORAL at 08:44

## 2021-01-01 RX ADMIN — QUETIAPINE FUMARATE 25 MG: 25 TABLET ORAL at 21:11

## 2021-01-01 RX ADMIN — DIPHENHYDRAMINE HYDROCHLORIDE 25 MG: 50 INJECTION, SOLUTION INTRAMUSCULAR; INTRAVENOUS at 20:52

## 2021-01-01 RX ADMIN — PIPERACILLIN AND TAZOBACTAM 3375 MG: 3; .375 INJECTION, POWDER, LYOPHILIZED, FOR SOLUTION INTRAVENOUS at 16:10

## 2021-01-01 RX ADMIN — NITROFURANTOIN MONOHYDRATE/MACROCRYSTALLINE 100 MG: 25; 75 CAPSULE ORAL at 09:40

## 2021-01-01 RX ADMIN — TRAMADOL HYDROCHLORIDE 25 MG: 50 TABLET, FILM COATED ORAL at 08:44

## 2021-01-01 RX ADMIN — HYDRALAZINE HYDROCHLORIDE 75 MG: 50 TABLET, FILM COATED ORAL at 19:59

## 2021-01-01 RX ADMIN — SODIUM CHLORIDE 1000 ML: 9 INJECTION, SOLUTION INTRAVENOUS at 08:49

## 2021-01-01 RX ADMIN — ACETAMINOPHEN 650 MG: 325 TABLET ORAL at 20:07

## 2021-01-01 RX ADMIN — ACETAMINOPHEN 500 MG: 500 TABLET ORAL at 06:04

## 2021-01-01 RX ADMIN — TRAZODONE HYDROCHLORIDE 25 MG: 50 TABLET ORAL at 23:43

## 2021-01-01 RX ADMIN — DOCUSATE SODIUM 100 MG: 100 CAPSULE, LIQUID FILLED ORAL at 08:49

## 2021-01-01 RX ADMIN — DOCUSATE SODIUM 100 MG: 100 CAPSULE, LIQUID FILLED ORAL at 20:58

## 2021-01-01 RX ADMIN — TRAZODONE HYDROCHLORIDE 25 MG: 50 TABLET ORAL at 21:01

## 2021-01-01 RX ADMIN — SODIUM CHLORIDE, PRESERVATIVE FREE 10 ML: 5 INJECTION INTRAVENOUS at 20:56

## 2021-01-01 RX ADMIN — ASPIRIN 81 MG: 81 TABLET, COATED ORAL at 09:25

## 2021-01-01 RX ADMIN — METOPROLOL TARTRATE 25 MG: 25 TABLET, FILM COATED ORAL at 19:58

## 2021-01-01 RX ADMIN — HYDRALAZINE HYDROCHLORIDE 75 MG: 50 TABLET, FILM COATED ORAL at 11:52

## 2021-01-01 RX ADMIN — PANTOPRAZOLE SODIUM 40 MG: 40 TABLET, DELAYED RELEASE ORAL at 06:25

## 2021-01-01 RX ADMIN — PIPERACILLIN AND TAZOBACTAM 3375 MG: 3; .375 INJECTION, POWDER, LYOPHILIZED, FOR SOLUTION INTRAVENOUS at 23:14

## 2021-01-01 RX ADMIN — AMLODIPINE BESYLATE 5 MG: 5 TABLET ORAL at 02:55

## 2021-01-01 RX ADMIN — HYDRALAZINE HYDROCHLORIDE 75 MG: 50 TABLET, FILM COATED ORAL at 13:22

## 2021-01-01 RX ADMIN — ONDANSETRON 4 MG: 2 INJECTION INTRAMUSCULAR; INTRAVENOUS at 08:48

## 2021-01-01 RX ADMIN — SODIUM CHLORIDE, PRESERVATIVE FREE 10 ML: 5 INJECTION INTRAVENOUS at 20:07

## 2021-01-01 RX ADMIN — SODIUM CHLORIDE, PRESERVATIVE FREE 10 ML: 5 INJECTION INTRAVENOUS at 08:58

## 2021-01-01 RX ADMIN — DOCUSATE SODIUM 100 MG: 100 CAPSULE, LIQUID FILLED ORAL at 08:31

## 2021-01-01 RX ADMIN — QUETIAPINE FUMARATE 25 MG: 25 TABLET ORAL at 10:25

## 2021-01-01 RX ADMIN — DONEPEZIL HYDROCHLORIDE 5 MG: 5 TABLET, FILM COATED ORAL at 17:06

## 2021-01-01 RX ADMIN — IPRATROPIUM BROMIDE AND ALBUTEROL SULFATE 1 AMPULE: .5; 3 SOLUTION RESPIRATORY (INHALATION) at 20:14

## 2021-01-01 RX ADMIN — TRAMADOL HYDROCHLORIDE 50 MG: 50 TABLET, FILM COATED ORAL at 02:35

## 2021-01-01 RX ADMIN — DONEPEZIL HYDROCHLORIDE 5 MG: 5 TABLET, FILM COATED ORAL at 17:26

## 2021-01-01 RX ADMIN — AMLODIPINE BESYLATE 2.5 MG: 2.5 TABLET ORAL at 09:25

## 2021-01-01 RX ADMIN — METOPROLOL TARTRATE 25 MG: 25 TABLET, FILM COATED ORAL at 20:57

## 2021-01-01 RX ADMIN — DOCUSATE SODIUM 100 MG: 100 CAPSULE, LIQUID FILLED ORAL at 08:56

## 2021-01-01 RX ADMIN — HYDRALAZINE HYDROCHLORIDE 75 MG: 50 TABLET, FILM COATED ORAL at 16:18

## 2021-01-01 RX ADMIN — SODIUM CHLORIDE, PRESERVATIVE FREE 10 ML: 5 INJECTION INTRAVENOUS at 21:08

## 2021-01-01 RX ADMIN — AMLODIPINE BESYLATE 5 MG: 5 TABLET ORAL at 08:56

## 2021-01-01 RX ADMIN — DOCUSATE SODIUM 100 MG: 100 CAPSULE, LIQUID FILLED ORAL at 08:06

## 2021-01-01 RX ADMIN — SODIUM CHLORIDE: 9 INJECTION, SOLUTION INTRAVENOUS at 01:25

## 2021-01-01 RX ADMIN — ASPIRIN 81 MG: 81 TABLET, COATED ORAL at 09:51

## 2021-01-01 RX ADMIN — HYDRALAZINE HYDROCHLORIDE 75 MG: 50 TABLET, FILM COATED ORAL at 18:40

## 2021-01-01 RX ADMIN — TRAMADOL HYDROCHLORIDE 50 MG: 50 TABLET, FILM COATED ORAL at 15:29

## 2021-01-01 RX ADMIN — FERROUS SULFATE TAB 325 MG (65 MG ELEMENTAL FE) 325 MG: 325 (65 FE) TAB at 19:40

## 2021-01-01 RX ADMIN — TRAZODONE HYDROCHLORIDE 25 MG: 50 TABLET ORAL at 21:56

## 2021-01-01 RX ADMIN — AMOXICILLIN AND CLAVULANATE POTASSIUM 1 TABLET: 875; 125 TABLET, FILM COATED ORAL at 23:37

## 2021-01-01 RX ADMIN — HYDRALAZINE HYDROCHLORIDE 75 MG: 50 TABLET, FILM COATED ORAL at 21:00

## 2021-01-01 RX ADMIN — NITROFURANTOIN MONOHYDRATE/MACROCRYSTALLINE 100 MG: 25; 75 CAPSULE ORAL at 19:40

## 2021-01-01 RX ADMIN — METOPROLOL TARTRATE 12.5 MG: 25 TABLET ORAL at 21:12

## 2021-01-01 RX ADMIN — AMLODIPINE BESYLATE 5 MG: 5 TABLET ORAL at 09:00

## 2021-01-01 RX ADMIN — MORPHINE SULFATE 4 MG: 4 INJECTION, SOLUTION INTRAMUSCULAR; INTRAVENOUS at 11:48

## 2021-01-01 RX ADMIN — MAGNESIUM SULFATE HEPTAHYDRATE 1000 MG: 500 INJECTION, SOLUTION INTRAMUSCULAR; INTRAVENOUS at 15:09

## 2021-01-01 RX ADMIN — TRAMADOL HYDROCHLORIDE 50 MG: 50 TABLET, FILM COATED ORAL at 04:46

## 2021-01-01 RX ADMIN — DONEPEZIL HYDROCHLORIDE 5 MG: 5 TABLET, FILM COATED ORAL at 18:39

## 2021-01-01 RX ADMIN — NITROFURANTOIN MONOHYDRATE/MACROCRYSTALLINE 100 MG: 25; 75 CAPSULE ORAL at 22:31

## 2021-01-01 RX ADMIN — NITROFURANTOIN MONOHYDRATE/MACROCRYSTALLINE 100 MG: 25; 75 CAPSULE ORAL at 20:14

## 2021-01-01 RX ADMIN — SODIUM CHLORIDE, PRESERVATIVE FREE 10 ML: 5 INJECTION INTRAVENOUS at 20:58

## 2021-01-01 RX ADMIN — HYDROCODONE BITARTRATE AND ACETAMINOPHEN 1 TABLET: 5; 325 TABLET ORAL at 18:21

## 2021-01-01 RX ADMIN — FERROUS SULFATE TAB 325 MG (65 MG ELEMENTAL FE) 325 MG: 325 (65 FE) TAB at 19:59

## 2021-01-01 RX ADMIN — POLYETHYLENE GLYCOL 3350 17 G: 17 POWDER, FOR SOLUTION ORAL at 09:19

## 2021-01-01 RX ADMIN — SODIUM CHLORIDE, PRESERVATIVE FREE 10 ML: 5 INJECTION INTRAVENOUS at 08:56

## 2021-01-01 RX ADMIN — METOPROLOL TARTRATE 12.5 MG: 25 TABLET ORAL at 09:40

## 2021-01-01 RX ADMIN — METOPROLOL TARTRATE 12.5 MG: 25 TABLET ORAL at 20:15

## 2021-01-01 RX ADMIN — FENTANYL CITRATE 25 MCG: 50 INJECTION, SOLUTION INTRAMUSCULAR; INTRAVENOUS at 20:56

## 2021-01-01 RX ADMIN — SODIUM CHLORIDE, PRESERVATIVE FREE 10 ML: 5 INJECTION INTRAVENOUS at 20:14

## 2021-01-01 RX ADMIN — ACETAMINOPHEN 500 MG: 500 TABLET ORAL at 13:14

## 2021-01-01 RX ADMIN — CLONIDINE HYDROCHLORIDE 0.1 MG: 0.1 TABLET ORAL at 08:58

## 2021-01-01 RX ADMIN — DOCUSATE SODIUM 100 MG: 100 CAPSULE, LIQUID FILLED ORAL at 21:08

## 2021-01-01 RX ADMIN — TRAMADOL HYDROCHLORIDE 25 MG: 50 TABLET, FILM COATED ORAL at 09:34

## 2021-01-01 RX ADMIN — DONEPEZIL HYDROCHLORIDE 5 MG: 5 TABLET, FILM COATED ORAL at 20:17

## 2021-01-01 RX ADMIN — ACETAMINOPHEN 650 MG: 325 TABLET ORAL at 12:04

## 2021-01-01 RX ADMIN — HYDRALAZINE HYDROCHLORIDE 75 MG: 50 TABLET, FILM COATED ORAL at 03:51

## 2021-01-01 RX ADMIN — IPRATROPIUM BROMIDE AND ALBUTEROL SULFATE 1 AMPULE: .5; 3 SOLUTION RESPIRATORY (INHALATION) at 20:37

## 2021-01-01 RX ADMIN — CLOSTRIDIUM TETANI TOXOID ANTIGEN (FORMALDEHYDE INACTIVATED) AND CORYNEBACTERIUM DIPHTHERIAE TOXOID ANTIGEN (FORMALDEHYDE INACTIVATED) 0.5 ML: 5; 2 INJECTION, SUSPENSION INTRAMUSCULAR at 12:21

## 2021-01-01 RX ADMIN — METOPROLOL TARTRATE 25 MG: 25 TABLET, FILM COATED ORAL at 10:25

## 2021-01-01 RX ADMIN — ASPIRIN 81 MG: 81 TABLET, COATED ORAL at 08:31

## 2021-01-01 RX ADMIN — FERROUS SULFATE TAB 325 MG (65 MG ELEMENTAL FE) 325 MG: 325 (65 FE) TAB at 20:17

## 2021-01-01 RX ADMIN — IPRATROPIUM BROMIDE AND ALBUTEROL SULFATE 1 AMPULE: .5; 3 SOLUTION RESPIRATORY (INHALATION) at 15:58

## 2021-01-01 RX ADMIN — OXYCODONE 5 MG: 5 TABLET ORAL at 12:28

## 2021-01-01 RX ADMIN — HYDRALAZINE HYDROCHLORIDE 75 MG: 50 TABLET, FILM COATED ORAL at 10:24

## 2021-01-01 RX ADMIN — ACETAMINOPHEN 500 MG: 500 TABLET ORAL at 21:02

## 2021-01-01 RX ADMIN — METOPROLOL TARTRATE 25 MG: 25 TABLET ORAL at 20:07

## 2021-01-01 RX ADMIN — HYDRALAZINE HYDROCHLORIDE 75 MG: 50 TABLET, FILM COATED ORAL at 20:30

## 2021-01-01 RX ADMIN — HYDRALAZINE HYDROCHLORIDE 75 MG: 50 TABLET, FILM COATED ORAL at 06:56

## 2021-01-01 RX ADMIN — POTASSIUM BICARBONATE 20 MEQ: 782 TABLET, EFFERVESCENT ORAL at 15:43

## 2021-01-01 RX ADMIN — ENOXAPARIN SODIUM 40 MG: 80 INJECTION SUBCUTANEOUS at 18:20

## 2021-01-01 RX ADMIN — HYDRALAZINE HYDROCHLORIDE 75 MG: 50 TABLET, FILM COATED ORAL at 11:58

## 2021-01-01 RX ADMIN — METOPROLOL TARTRATE 25 MG: 25 TABLET ORAL at 08:59

## 2021-01-01 RX ADMIN — SODIUM CHLORIDE, PRESERVATIVE FREE 10 ML: 5 INJECTION INTRAVENOUS at 08:44

## 2021-01-01 RX ADMIN — METOPROLOL TARTRATE 25 MG: 25 TABLET ORAL at 20:22

## 2021-01-01 RX ADMIN — FERROUS SULFATE TAB 325 MG (65 MG ELEMENTAL FE) 325 MG: 325 (65 FE) TAB at 14:01

## 2021-01-01 RX ADMIN — SODIUM CHLORIDE, PRESERVATIVE FREE 10 ML: 5 INJECTION INTRAVENOUS at 20:23

## 2021-01-01 RX ADMIN — MORPHINE SULFATE 2 MG: 2 INJECTION, SOLUTION INTRAMUSCULAR; INTRAVENOUS at 01:35

## 2021-01-01 RX ADMIN — ACETAMINOPHEN 500 MG: 500 TABLET ORAL at 04:46

## 2021-01-01 RX ADMIN — QUETIAPINE FUMARATE 25 MG: 25 TABLET ORAL at 09:18

## 2021-01-01 RX ADMIN — FERROUS SULFATE TAB 325 MG (65 MG ELEMENTAL FE) 325 MG: 325 (65 FE) TAB at 09:31

## 2021-01-01 RX ADMIN — HYDROCODONE BITARTRATE AND ACETAMINOPHEN 1 TABLET: 5; 325 TABLET ORAL at 11:51

## 2021-01-01 RX ADMIN — HYDRALAZINE HYDROCHLORIDE 75 MG: 50 TABLET, FILM COATED ORAL at 09:18

## 2021-01-01 RX ADMIN — ACETAMINOPHEN 650 MG: 325 TABLET ORAL at 11:10

## 2021-01-01 RX ADMIN — IPRATROPIUM BROMIDE AND ALBUTEROL SULFATE 1 AMPULE: .5; 3 SOLUTION RESPIRATORY (INHALATION) at 16:42

## 2021-01-01 RX ADMIN — TRAMADOL HYDROCHLORIDE 50 MG: 50 TABLET, FILM COATED ORAL at 09:04

## 2021-01-01 RX ADMIN — QUETIAPINE FUMARATE 25 MG: 25 TABLET ORAL at 08:44

## 2021-01-01 RX ADMIN — PIPERACILLIN AND TAZOBACTAM 3375 MG: 3; .375 INJECTION, POWDER, LYOPHILIZED, FOR SOLUTION INTRAVENOUS at 06:43

## 2021-01-01 RX ADMIN — ENOXAPARIN SODIUM 40 MG: 40 INJECTION SUBCUTANEOUS at 17:06

## 2021-01-01 RX ADMIN — TRAMADOL HYDROCHLORIDE 50 MG: 50 TABLET, FILM COATED ORAL at 08:56

## 2021-01-01 RX ADMIN — PROPOFOL 50 MG: 10 INJECTION, EMULSION INTRAVENOUS at 17:37

## 2021-01-01 RX ADMIN — AMLODIPINE BESYLATE 2.5 MG: 2.5 TABLET ORAL at 09:31

## 2021-01-01 RX ADMIN — HYDROCODONE BITARTRATE AND ACETAMINOPHEN 1 TABLET: 5; 325 TABLET ORAL at 09:59

## 2021-01-01 RX ADMIN — SODIUM CHLORIDE, PRESERVATIVE FREE 10 ML: 5 INJECTION INTRAVENOUS at 08:37

## 2021-01-01 RX ADMIN — SODIUM CHLORIDE, PRESERVATIVE FREE 10 ML: 5 INJECTION INTRAVENOUS at 09:25

## 2021-01-01 RX ADMIN — FERROUS SULFATE TAB 325 MG (65 MG ELEMENTAL FE) 325 MG: 325 (65 FE) TAB at 10:26

## 2021-01-01 RX ADMIN — METOPROLOL TARTRATE 25 MG: 25 TABLET, FILM COATED ORAL at 20:17

## 2021-01-01 RX ADMIN — TRAZODONE HYDROCHLORIDE 25 MG: 50 TABLET ORAL at 19:59

## 2021-01-01 RX ADMIN — AMLODIPINE BESYLATE 2.5 MG: 2.5 TABLET ORAL at 09:51

## 2021-01-01 RX ADMIN — FENTANYL CITRATE 25 MCG: 50 INJECTION, SOLUTION INTRAMUSCULAR; INTRAVENOUS at 20:42

## 2021-01-01 RX ADMIN — QUETIAPINE FUMARATE 25 MG: 25 TABLET ORAL at 20:14

## 2021-01-01 RX ADMIN — TRAMADOL HYDROCHLORIDE 50 MG: 50 TABLET, FILM COATED ORAL at 16:33

## 2021-01-01 RX ADMIN — PANTOPRAZOLE SODIUM 40 MG: 40 TABLET, DELAYED RELEASE ORAL at 06:09

## 2021-01-01 RX ADMIN — HYDRALAZINE HYDROCHLORIDE 75 MG: 50 TABLET, FILM COATED ORAL at 08:05

## 2021-01-01 RX ADMIN — BACITRACIN ZINC NEOMYCIN SULFATE POLYMYXIN B SULFATE: 400; 3.5; 5 OINTMENT TOPICAL at 17:56

## 2021-01-01 RX ADMIN — HYDROCODONE BITARTRATE AND ACETAMINOPHEN 1 TABLET: 5; 325 TABLET ORAL at 03:32

## 2021-01-01 RX ADMIN — ASPIRIN 81 MG: 81 TABLET, COATED ORAL at 09:31

## 2021-01-01 RX ADMIN — CLONIDINE HYDROCHLORIDE 0.1 MG: 0.1 TABLET ORAL at 15:39

## 2021-01-01 RX ADMIN — IPRATROPIUM BROMIDE AND ALBUTEROL SULFATE 1 AMPULE: .5; 3 SOLUTION RESPIRATORY (INHALATION) at 08:41

## 2021-01-01 RX ADMIN — DOCUSATE SODIUM 100 MG: 100 CAPSULE, LIQUID FILLED ORAL at 09:32

## 2021-01-01 RX ADMIN — HYDROCODONE BITARTRATE AND ACETAMINOPHEN 1 TABLET: 5; 325 TABLET ORAL at 20:22

## 2021-01-01 RX ADMIN — QUETIAPINE FUMARATE 25 MG: 25 TABLET ORAL at 19:58

## 2021-01-01 RX ADMIN — FERROUS SULFATE TAB 325 MG (65 MG ELEMENTAL FE) 325 MG: 325 (65 FE) TAB at 20:16

## 2021-01-01 RX ADMIN — DOCUSATE SODIUM 100 MG: 100 CAPSULE, LIQUID FILLED ORAL at 21:01

## 2021-01-01 RX ADMIN — TRAMADOL HYDROCHLORIDE 50 MG: 50 TABLET, FILM COATED ORAL at 22:53

## 2021-01-01 RX ADMIN — ACETAMINOPHEN 500 MG: 500 TABLET ORAL at 22:31

## 2021-01-01 RX ADMIN — FERROUS SULFATE TAB 325 MG (65 MG ELEMENTAL FE) 325 MG: 325 (65 FE) TAB at 09:18

## 2021-01-01 RX ADMIN — TRAMADOL HYDROCHLORIDE 25 MG: 50 TABLET, FILM COATED ORAL at 10:23

## 2021-01-01 RX ADMIN — IPRATROPIUM BROMIDE AND ALBUTEROL SULFATE 1 AMPULE: .5; 3 SOLUTION RESPIRATORY (INHALATION) at 20:22

## 2021-01-01 RX ADMIN — HYDRALAZINE HYDROCHLORIDE 75 MG: 50 TABLET, FILM COATED ORAL at 14:01

## 2021-01-01 RX ADMIN — HYDRALAZINE HYDROCHLORIDE 75 MG: 50 TABLET, FILM COATED ORAL at 12:06

## 2021-01-01 RX ADMIN — SENNOSIDES 17.2 MG: 8.6 TABLET, FILM COATED ORAL at 19:58

## 2021-01-01 RX ADMIN — AMLODIPINE BESYLATE 2.5 MG: 2.5 TABLET ORAL at 08:43

## 2021-01-01 RX ADMIN — HYDRALAZINE HYDROCHLORIDE 75 MG: 50 TABLET, FILM COATED ORAL at 15:59

## 2021-01-01 RX ADMIN — HYDRALAZINE HYDROCHLORIDE 75 MG: 50 TABLET, FILM COATED ORAL at 15:12

## 2021-01-01 RX ADMIN — HYDRALAZINE HYDROCHLORIDE 75 MG: 50 TABLET, FILM COATED ORAL at 23:19

## 2021-01-01 RX ADMIN — HYDRALAZINE HYDROCHLORIDE 75 MG: 50 TABLET, FILM COATED ORAL at 03:15

## 2021-01-01 RX ADMIN — SODIUM CHLORIDE, PRESERVATIVE FREE 10 ML: 5 INJECTION INTRAVENOUS at 20:17

## 2021-01-01 RX ADMIN — BACITRACIN ZINC NEOMYCIN SULFATE POLYMYXIN B SULFATE: 400; 3.5; 5 OINTMENT TOPICAL at 16:59

## 2021-01-01 RX ADMIN — ACETAMINOPHEN 650 MG: 325 TABLET ORAL at 19:59

## 2021-01-01 RX ADMIN — METOPROLOL TARTRATE 25 MG: 25 TABLET, FILM COATED ORAL at 09:50

## 2021-01-01 RX ADMIN — METOPROLOL TARTRATE 25 MG: 25 TABLET, FILM COATED ORAL at 08:43

## 2021-01-01 RX ADMIN — HYDRALAZINE HYDROCHLORIDE 75 MG: 50 TABLET, FILM COATED ORAL at 08:44

## 2021-01-01 RX ADMIN — NITROFURANTOIN MONOHYDRATE/MACROCRYSTALLINE 100 MG: 25; 75 CAPSULE ORAL at 10:24

## 2021-01-01 RX ADMIN — TRAMADOL HYDROCHLORIDE 25 MG: 50 TABLET, FILM COATED ORAL at 20:17

## 2021-01-01 RX ADMIN — HYDRALAZINE HYDROCHLORIDE 75 MG: 50 TABLET, FILM COATED ORAL at 23:44

## 2021-01-01 RX ADMIN — ACETAMINOPHEN 500 MG: 500 TABLET ORAL at 21:23

## 2021-01-01 RX ADMIN — ACETAMINOPHEN 650 MG: 325 TABLET ORAL at 23:41

## 2021-01-01 RX ADMIN — IPRATROPIUM BROMIDE AND ALBUTEROL SULFATE 1 AMPULE: .5; 3 SOLUTION RESPIRATORY (INHALATION) at 15:24

## 2021-01-01 RX ADMIN — TRAMADOL HYDROCHLORIDE 50 MG: 50 TABLET, FILM COATED ORAL at 16:12

## 2021-01-01 RX ADMIN — HYDRALAZINE HYDROCHLORIDE 75 MG: 50 TABLET, FILM COATED ORAL at 20:56

## 2021-01-01 RX ADMIN — HYDROCODONE BITARTRATE AND ACETAMINOPHEN 0.5 TABLET: 5; 325 TABLET ORAL at 18:13

## 2021-01-01 RX ADMIN — IPRATROPIUM BROMIDE AND ALBUTEROL SULFATE 1 AMPULE: .5; 3 SOLUTION RESPIRATORY (INHALATION) at 16:36

## 2021-01-01 RX ADMIN — FERROUS SULFATE TAB 325 MG (65 MG ELEMENTAL FE) 325 MG: 325 (65 FE) TAB at 21:08

## 2021-01-01 RX ADMIN — FERROUS SULFATE TAB 325 MG (65 MG ELEMENTAL FE) 325 MG: 325 (65 FE) TAB at 21:01

## 2021-01-01 RX ADMIN — HYDRALAZINE HYDROCHLORIDE 75 MG: 50 TABLET, FILM COATED ORAL at 09:19

## 2021-01-01 RX ADMIN — ENOXAPARIN SODIUM 40 MG: 80 INJECTION SUBCUTANEOUS at 17:55

## 2021-01-01 RX ADMIN — ENOXAPARIN SODIUM 40 MG: 80 INJECTION SUBCUTANEOUS at 16:57

## 2021-01-01 RX ADMIN — SODIUM CHLORIDE: 9 INJECTION, SOLUTION INTRAVENOUS at 03:59

## 2021-01-01 RX ADMIN — HYDROCODONE BITARTRATE AND ACETAMINOPHEN 1 TABLET: 5; 325 TABLET ORAL at 09:00

## 2021-01-01 RX ADMIN — AMLODIPINE BESYLATE 2.5 MG: 2.5 TABLET ORAL at 08:31

## 2021-01-01 RX ADMIN — ACETAMINOPHEN 500 MG: 500 TABLET ORAL at 14:03

## 2021-01-01 RX ADMIN — BACITRACIN ZINC NEOMYCIN SULFATE POLYMYXIN B SULFATE 1 EACH: 400; 3.5; 5 OINTMENT TOPICAL at 08:33

## 2021-01-01 RX ADMIN — HYDROCODONE BITARTRATE AND ACETAMINOPHEN 1 TABLET: 5; 325 TABLET ORAL at 23:54

## 2021-01-01 RX ADMIN — SODIUM CHLORIDE: 9 INJECTION, SOLUTION INTRAVENOUS at 16:09

## 2021-01-01 RX ADMIN — FERROUS SULFATE TAB 325 MG (65 MG ELEMENTAL FE) 325 MG: 325 (65 FE) TAB at 20:31

## 2021-01-01 RX ADMIN — PIPERACILLIN AND TAZOBACTAM 3375 MG: 3; .375 INJECTION, POWDER, LYOPHILIZED, FOR SOLUTION INTRAVENOUS at 00:09

## 2021-01-01 RX ADMIN — SENNOSIDES 17.2 MG: 8.6 TABLET, FILM COATED ORAL at 20:30

## 2021-01-01 RX ADMIN — HYDRALAZINE HYDROCHLORIDE 75 MG: 50 TABLET, FILM COATED ORAL at 21:08

## 2021-01-01 RX ADMIN — OXYCODONE 5 MG: 5 TABLET ORAL at 08:31

## 2021-01-01 RX ADMIN — AMOXICILLIN AND CLAVULANATE POTASSIUM 1 TABLET: 875; 125 TABLET, FILM COATED ORAL at 13:56

## 2021-01-01 RX ADMIN — TRAZODONE HYDROCHLORIDE 25 MG: 50 TABLET ORAL at 20:18

## 2021-01-01 RX ADMIN — FERROUS SULFATE TAB 325 MG (65 MG ELEMENTAL FE) 325 MG: 325 (65 FE) TAB at 08:43

## 2021-01-01 RX ADMIN — QUETIAPINE FUMARATE 25 MG: 25 TABLET ORAL at 20:30

## 2021-01-01 RX ADMIN — SODIUM CHLORIDE, PRESERVATIVE FREE 10 ML: 5 INJECTION INTRAVENOUS at 08:52

## 2021-01-01 RX ADMIN — QUETIAPINE FUMARATE 25 MG: 25 TABLET ORAL at 09:50

## 2021-01-01 RX ADMIN — DONEPEZIL HYDROCHLORIDE 5 MG: 5 TABLET, FILM COATED ORAL at 16:56

## 2021-01-01 RX ADMIN — HYDRALAZINE HYDROCHLORIDE 75 MG: 50 TABLET, FILM COATED ORAL at 22:53

## 2021-01-01 RX ADMIN — QUETIAPINE FUMARATE 25 MG: 25 TABLET ORAL at 19:39

## 2021-01-01 RX ADMIN — PIPERACILLIN AND TAZOBACTAM 3375 MG: 3; .375 INJECTION, POWDER, LYOPHILIZED, FOR SOLUTION INTRAVENOUS at 08:44

## 2021-01-01 RX ADMIN — PIPERACILLIN AND TAZOBACTAM 3375 MG: 3; .375 INJECTION, POWDER, LYOPHILIZED, FOR SOLUTION INTRAVENOUS at 23:46

## 2021-01-01 RX ADMIN — FENTANYL CITRATE 50 MCG: 50 INJECTION, SOLUTION INTRAMUSCULAR; INTRAVENOUS at 19:27

## 2021-01-01 RX ADMIN — SODIUM CHLORIDE: 9 INJECTION, SOLUTION INTRAVENOUS at 20:09

## 2021-01-01 RX ADMIN — METOPROLOL TARTRATE 25 MG: 25 TABLET, FILM COATED ORAL at 09:31

## 2021-01-01 RX ADMIN — METOPROLOL TARTRATE 12.5 MG: 25 TABLET ORAL at 10:23

## 2021-01-01 RX ADMIN — METOPROLOL TARTRATE 25 MG: 25 TABLET ORAL at 20:16

## 2021-01-01 RX ADMIN — SENNOSIDES 17.2 MG: 8.6 TABLET, FILM COATED ORAL at 20:17

## 2021-01-01 RX ADMIN — FERROUS SULFATE TAB 325 MG (65 MG ELEMENTAL FE) 325 MG: 325 (65 FE) TAB at 20:57

## 2021-01-01 RX ADMIN — SODIUM CHLORIDE, PRESERVATIVE FREE 10 ML: 5 INJECTION INTRAVENOUS at 13:32

## 2021-01-01 RX ADMIN — IPRATROPIUM BROMIDE AND ALBUTEROL SULFATE 1 AMPULE: .5; 3 SOLUTION RESPIRATORY (INHALATION) at 10:39

## 2021-01-01 RX ADMIN — ASPIRIN 81 MG: 81 TABLET, COATED ORAL at 08:43

## 2021-01-01 RX ADMIN — CLONIDINE HYDROCHLORIDE 0.1 MG: 0.1 TABLET ORAL at 18:52

## 2021-01-01 RX ADMIN — DOCUSATE SODIUM 100 MG: 100 CAPSULE, LIQUID FILLED ORAL at 09:01

## 2021-01-01 RX ADMIN — SODIUM CHLORIDE, PRESERVATIVE FREE 10 ML: 5 INJECTION INTRAVENOUS at 09:19

## 2021-01-01 RX ADMIN — PIPERACILLIN AND TAZOBACTAM 3375 MG: 3; .375 INJECTION, POWDER, LYOPHILIZED, FOR SOLUTION INTRAVENOUS at 01:41

## 2021-01-01 RX ADMIN — ENOXAPARIN SODIUM 40 MG: 80 INJECTION SUBCUTANEOUS at 15:49

## 2021-01-01 RX ADMIN — FERROUS SULFATE TAB 325 MG (65 MG ELEMENTAL FE) 325 MG: 325 (65 FE) TAB at 08:31

## 2021-01-01 RX ADMIN — SENNOSIDES 17.2 MG: 8.6 TABLET, FILM COATED ORAL at 21:08

## 2021-01-01 RX ADMIN — METOPROLOL TARTRATE 25 MG: 25 TABLET, FILM COATED ORAL at 20:30

## 2021-01-01 RX ADMIN — IPRATROPIUM BROMIDE AND ALBUTEROL SULFATE 1 AMPULE: .5; 3 SOLUTION RESPIRATORY (INHALATION) at 12:54

## 2021-01-01 RX ADMIN — METOPROLOL TARTRATE 25 MG: 25 TABLET ORAL at 08:56

## 2021-01-01 RX ADMIN — DOCUSATE SODIUM 100 MG: 100 CAPSULE, LIQUID FILLED ORAL at 20:30

## 2021-01-01 RX ADMIN — DONEPEZIL HYDROCHLORIDE 5 MG: 5 TABLET, FILM COATED ORAL at 17:17

## 2021-01-01 RX ADMIN — IPRATROPIUM BROMIDE AND ALBUTEROL SULFATE 1 AMPULE: .5; 3 SOLUTION RESPIRATORY (INHALATION) at 16:05

## 2021-01-01 RX ADMIN — PANTOPRAZOLE SODIUM 40 MG: 40 INJECTION, POWDER, FOR SOLUTION INTRAVENOUS at 09:51

## 2021-01-01 RX ADMIN — PANTOPRAZOLE SODIUM 40 MG: 40 INJECTION, POWDER, FOR SOLUTION INTRAVENOUS at 22:02

## 2021-01-01 RX ADMIN — ACETAMINOPHEN 500 MG: 500 TABLET ORAL at 22:53

## 2021-01-01 RX ADMIN — AMLODIPINE BESYLATE 2.5 MG: 2.5 TABLET ORAL at 10:25

## 2021-01-01 RX ADMIN — METOPROLOL TARTRATE 25 MG: 25 TABLET, FILM COATED ORAL at 21:01

## 2021-01-01 RX ADMIN — DOCUSATE SODIUM 100 MG: 100 CAPSULE, LIQUID FILLED ORAL at 19:40

## 2021-01-01 RX ADMIN — PIPERACILLIN AND TAZOBACTAM 3375 MG: 3; .375 INJECTION, POWDER, LYOPHILIZED, FOR SOLUTION INTRAVENOUS at 06:34

## 2021-01-01 RX ADMIN — SENNOSIDES 17.2 MG: 8.6 TABLET, FILM COATED ORAL at 21:01

## 2021-01-01 RX ADMIN — CEFAZOLIN 2000 MG: 1 INJECTION, POWDER, FOR SOLUTION INTRAMUSCULAR; INTRAVENOUS; PARENTERAL at 18:18

## 2021-01-01 RX ADMIN — IPRATROPIUM BROMIDE AND ALBUTEROL SULFATE 1 AMPULE: .5; 3 SOLUTION RESPIRATORY (INHALATION) at 11:53

## 2021-01-01 RX ADMIN — ACETAMINOPHEN 650 MG: 325 TABLET ORAL at 04:25

## 2021-01-01 RX ADMIN — OXYCODONE HYDROCHLORIDE AND ACETAMINOPHEN 1 TABLET: 5; 325 TABLET ORAL at 08:48

## 2021-01-01 RX ADMIN — FERROUS SULFATE TAB 325 MG (65 MG ELEMENTAL FE) 325 MG: 325 (65 FE) TAB at 09:02

## 2021-01-01 RX ADMIN — METOPROLOL TARTRATE 25 MG: 25 TABLET ORAL at 08:44

## 2021-01-01 RX ADMIN — AMLODIPINE BESYLATE 2.5 MG: 5 TABLET ORAL at 09:17

## 2021-01-01 RX ADMIN — ACETAMINOPHEN 650 MG: 325 TABLET ORAL at 06:09

## 2021-01-01 RX ADMIN — HYDRALAZINE HYDROCHLORIDE 75 MG: 50 TABLET, FILM COATED ORAL at 03:32

## 2021-01-01 RX ADMIN — OXYCODONE 5 MG: 5 TABLET ORAL at 13:22

## 2021-01-01 RX ADMIN — SODIUM CHLORIDE: 9 INJECTION, SOLUTION INTRAVENOUS at 00:09

## 2021-01-01 RX ADMIN — BACITRACIN ZINC NEOMYCIN SULFATE POLYMYXIN B SULFATE: 400; 3.5; 5 OINTMENT TOPICAL at 21:02

## 2021-01-01 RX ADMIN — ENOXAPARIN SODIUM 40 MG: 80 INJECTION SUBCUTANEOUS at 16:06

## 2021-01-01 RX ADMIN — FERROUS SULFATE TAB 325 MG (65 MG ELEMENTAL FE) 325 MG: 325 (65 FE) TAB at 20:53

## 2021-01-01 RX ADMIN — ROCURONIUM BROMIDE 20 MG: 10 INJECTION INTRAVENOUS at 17:37

## 2021-01-01 RX ADMIN — HYDRALAZINE HYDROCHLORIDE 75 MG: 50 TABLET, FILM COATED ORAL at 01:24

## 2021-01-01 RX ADMIN — ACETAMINOPHEN 650 MG: 325 TABLET ORAL at 23:47

## 2021-01-01 RX ADMIN — FENTANYL CITRATE 50 MCG: 50 INJECTION, SOLUTION INTRAMUSCULAR; INTRAVENOUS at 19:35

## 2021-01-01 RX ADMIN — IPRATROPIUM BROMIDE AND ALBUTEROL SULFATE 1 AMPULE: .5; 3 SOLUTION RESPIRATORY (INHALATION) at 14:25

## 2021-01-01 RX ADMIN — FERROUS SULFATE TAB 325 MG (65 MG ELEMENTAL FE) 325 MG: 325 (65 FE) TAB at 10:24

## 2021-01-01 RX ADMIN — PIPERACILLIN AND TAZOBACTAM 3375 MG: 3; .375 INJECTION, POWDER, LYOPHILIZED, FOR SOLUTION INTRAVENOUS at 16:06

## 2021-01-01 RX ADMIN — SENNOSIDES 17.2 MG: 8.6 TABLET, FILM COATED ORAL at 19:40

## 2021-01-01 RX ADMIN — QUETIAPINE FUMARATE 25 MG: 25 TABLET ORAL at 20:17

## 2021-01-01 RX ADMIN — HYDRALAZINE HYDROCHLORIDE 75 MG: 50 TABLET, FILM COATED ORAL at 13:56

## 2021-01-01 RX ADMIN — LIDOCAINE HYDROCHLORIDE AND EPINEPHRINE: 10; 10 INJECTION, SOLUTION INFILTRATION; PERINEURAL at 21:52

## 2021-01-01 RX ADMIN — HYDROMORPHONE HYDROCHLORIDE 0.5 MG: 1 INJECTION, SOLUTION INTRAMUSCULAR; INTRAVENOUS; SUBCUTANEOUS at 10:54

## 2021-01-01 RX ADMIN — HYDRALAZINE HYDROCHLORIDE 75 MG: 50 TABLET, FILM COATED ORAL at 09:00

## 2021-01-01 RX ADMIN — TRAMADOL HYDROCHLORIDE 50 MG: 50 TABLET, FILM COATED ORAL at 03:32

## 2021-01-01 RX ADMIN — ACETAMINOPHEN 500 MG: 500 TABLET ORAL at 15:58

## 2021-01-01 RX ADMIN — ASPIRIN 81 MG: 81 TABLET, COATED ORAL at 10:26

## 2021-01-01 RX ADMIN — ACETAMINOPHEN 650 MG: 325 TABLET ORAL at 17:32

## 2021-01-01 RX ADMIN — TRAMADOL HYDROCHLORIDE 25 MG: 50 TABLET, FILM COATED ORAL at 14:01

## 2021-01-01 RX ADMIN — HYDRALAZINE HYDROCHLORIDE 75 MG: 50 TABLET, FILM COATED ORAL at 04:23

## 2021-01-01 RX ADMIN — HYDRALAZINE HYDROCHLORIDE 75 MG: 50 TABLET, FILM COATED ORAL at 17:06

## 2021-01-01 RX ADMIN — METOPROLOL TARTRATE 25 MG: 25 TABLET, FILM COATED ORAL at 21:08

## 2021-01-01 RX ADMIN — BACITRACIN ZINC NEOMYCIN SULFATE POLYMYXIN B SULFATE: 400; 3.5; 5 OINTMENT TOPICAL at 09:34

## 2021-01-01 RX ADMIN — FERROUS SULFATE TAB 325 MG (65 MG ELEMENTAL FE) 325 MG: 325 (65 FE) TAB at 08:56

## 2021-01-01 RX ADMIN — FERROUS SULFATE TAB 325 MG (65 MG ELEMENTAL FE) 325 MG: 325 (65 FE) TAB at 09:25

## 2021-01-01 RX ADMIN — SODIUM CHLORIDE, PRESERVATIVE FREE 10 ML: 5 INJECTION INTRAVENOUS at 19:41

## 2021-01-01 RX ADMIN — TRAMADOL HYDROCHLORIDE 25 MG: 50 TABLET, FILM COATED ORAL at 18:39

## 2021-01-01 RX ADMIN — IPRATROPIUM BROMIDE AND ALBUTEROL SULFATE 1 AMPULE: .5; 3 SOLUTION RESPIRATORY (INHALATION) at 20:33

## 2021-01-01 RX ADMIN — CLONIDINE HYDROCHLORIDE 0.1 MG: 0.1 TABLET ORAL at 16:30

## 2021-01-01 RX ADMIN — TRAMADOL HYDROCHLORIDE 25 MG: 50 TABLET, FILM COATED ORAL at 15:12

## 2021-01-01 RX ADMIN — PANTOPRAZOLE SODIUM 40 MG: 40 INJECTION, POWDER, FOR SOLUTION INTRAVENOUS at 08:43

## 2021-01-01 RX ADMIN — TRAMADOL HYDROCHLORIDE 50 MG: 50 TABLET, FILM COATED ORAL at 07:57

## 2021-01-01 RX ADMIN — DOCUSATE SODIUM 100 MG: 100 CAPSULE, LIQUID FILLED ORAL at 10:25

## 2021-01-01 RX ADMIN — SODIUM CHLORIDE, PRESERVATIVE FREE 10 ML: 5 INJECTION INTRAVENOUS at 10:54

## 2021-01-01 RX ADMIN — DONEPEZIL HYDROCHLORIDE 5 MG: 5 TABLET, FILM COATED ORAL at 21:09

## 2021-01-01 RX ADMIN — IPRATROPIUM BROMIDE AND ALBUTEROL SULFATE 1 AMPULE: .5; 3 SOLUTION RESPIRATORY (INHALATION) at 12:06

## 2021-01-01 RX ADMIN — SODIUM CHLORIDE, PRESERVATIVE FREE 10 ML: 5 INJECTION INTRAVENOUS at 13:24

## 2021-01-01 RX ADMIN — DIPHENHYDRAMINE HYDROCHLORIDE 25 MG: 50 INJECTION, SOLUTION INTRAMUSCULAR; INTRAVENOUS at 02:06

## 2021-01-01 RX ADMIN — HYDRALAZINE HYDROCHLORIDE 75 MG: 50 TABLET, FILM COATED ORAL at 03:46

## 2021-01-01 RX ADMIN — BACITRACIN ZINC NEOMYCIN SULFATE POLYMYXIN B SULFATE: 400; 3.5; 5 OINTMENT TOPICAL at 12:06

## 2021-01-01 RX ADMIN — OXYCODONE 2.5 MG: 5 TABLET ORAL at 04:42

## 2021-01-01 RX ADMIN — SODIUM CHLORIDE, PRESERVATIVE FREE 10 ML: 5 INJECTION INTRAVENOUS at 21:02

## 2021-01-01 RX ADMIN — QUETIAPINE FUMARATE 25 MG: 25 TABLET ORAL at 09:41

## 2021-01-01 RX ADMIN — HYDRALAZINE HYDROCHLORIDE 75 MG: 50 TABLET, FILM COATED ORAL at 08:56

## 2021-01-01 RX ADMIN — TRAMADOL HYDROCHLORIDE 50 MG: 50 TABLET, FILM COATED ORAL at 22:32

## 2021-01-01 RX ADMIN — METOPROLOL TARTRATE 25 MG: 25 TABLET ORAL at 08:55

## 2021-01-01 RX ADMIN — BACITRACIN ZINC NEOMYCIN SULFATE POLYMYXIN B SULFATE: 400; 3.5; 5 OINTMENT TOPICAL at 21:08

## 2021-01-01 RX ADMIN — SENNOSIDES 17.2 MG: 8.6 TABLET, FILM COATED ORAL at 21:14

## 2021-01-01 RX ADMIN — DOCUSATE SODIUM 100 MG: 100 CAPSULE, LIQUID FILLED ORAL at 20:16

## 2021-01-01 RX ADMIN — METOPROLOL TARTRATE 25 MG: 25 TABLET, FILM COATED ORAL at 08:30

## 2021-01-01 RX ADMIN — FERROUS SULFATE TAB 325 MG (65 MG ELEMENTAL FE) 325 MG: 325 (65 FE) TAB at 21:11

## 2021-01-01 RX ADMIN — TRAMADOL HYDROCHLORIDE 50 MG: 50 TABLET, FILM COATED ORAL at 15:57

## 2021-01-01 RX ADMIN — IPRATROPIUM BROMIDE AND ALBUTEROL SULFATE 1 AMPULE: .5; 3 SOLUTION RESPIRATORY (INHALATION) at 16:07

## 2021-01-01 RX ADMIN — IPRATROPIUM BROMIDE AND ALBUTEROL SULFATE 1 AMPULE: .5; 3 SOLUTION RESPIRATORY (INHALATION) at 07:57

## 2021-01-01 RX ADMIN — NITROFURANTOIN MONOHYDRATE/MACROCRYSTALLINE 100 MG: 25; 75 CAPSULE ORAL at 09:18

## 2021-01-01 RX ADMIN — AMLODIPINE BESYLATE 2.5 MG: 5 TABLET ORAL at 09:39

## 2021-01-01 RX ADMIN — FERROUS SULFATE TAB 325 MG (65 MG ELEMENTAL FE) 325 MG: 325 (65 FE) TAB at 08:44

## 2021-01-01 RX ADMIN — PIPERACILLIN AND TAZOBACTAM 3375 MG: 3; .375 INJECTION, POWDER, LYOPHILIZED, FOR SOLUTION INTRAVENOUS at 17:13

## 2021-01-01 RX ADMIN — ENOXAPARIN SODIUM 40 MG: 80 INJECTION SUBCUTANEOUS at 15:39

## 2021-01-01 RX ADMIN — METOPROLOL TARTRATE 12.5 MG: 25 TABLET ORAL at 09:18

## 2021-01-01 RX ADMIN — DONEPEZIL HYDROCHLORIDE 5 MG: 5 TABLET, FILM COATED ORAL at 16:57

## 2021-01-01 RX ADMIN — DOCUSATE SODIUM 100 MG: 100 CAPSULE, LIQUID FILLED ORAL at 19:58

## 2021-01-01 RX ADMIN — HYDRALAZINE HYDROCHLORIDE 75 MG: 50 TABLET, FILM COATED ORAL at 23:47

## 2021-01-01 RX ADMIN — ACETAMINOPHEN 500 MG: 500 TABLET ORAL at 15:16

## 2021-01-01 RX ADMIN — SENNOSIDES 17.2 MG: 8.6 TABLET, FILM COATED ORAL at 20:56

## 2021-01-01 RX ADMIN — IPRATROPIUM BROMIDE AND ALBUTEROL SULFATE 1 AMPULE: .5; 3 SOLUTION RESPIRATORY (INHALATION) at 08:18

## 2021-01-01 RX ADMIN — DONEPEZIL HYDROCHLORIDE 5 MG: 5 TABLET, FILM COATED ORAL at 17:55

## 2021-01-01 RX ADMIN — AMLODIPINE BESYLATE 5 MG: 5 TABLET ORAL at 23:19

## 2021-01-01 RX ADMIN — QUETIAPINE FUMARATE 25 MG: 25 TABLET ORAL at 21:01

## 2021-01-01 RX ADMIN — QUETIAPINE FUMARATE 25 MG: 25 TABLET ORAL at 08:58

## 2021-01-01 RX ADMIN — HYDRALAZINE HYDROCHLORIDE 75 MG: 50 TABLET, FILM COATED ORAL at 20:17

## 2021-01-01 RX ADMIN — PIPERACILLIN AND TAZOBACTAM 3375 MG: 3; .375 INJECTION, POWDER, LYOPHILIZED, FOR SOLUTION INTRAVENOUS at 15:50

## 2021-01-01 RX ADMIN — ACETAMINOPHEN 500 MG: 500 TABLET ORAL at 10:23

## 2021-01-01 RX ADMIN — PANTOPRAZOLE SODIUM 40 MG: 40 INJECTION, POWDER, FOR SOLUTION INTRAVENOUS at 10:26

## 2021-01-01 RX ADMIN — PANTOPRAZOLE SODIUM 40 MG: 40 INJECTION, POWDER, FOR SOLUTION INTRAVENOUS at 09:25

## 2021-01-01 RX ADMIN — QUETIAPINE FUMARATE 25 MG: 25 TABLET ORAL at 09:25

## 2021-01-01 RX ADMIN — DOCUSATE SODIUM 100 MG: 100 CAPSULE, LIQUID FILLED ORAL at 09:25

## 2021-01-01 RX ADMIN — IPRATROPIUM BROMIDE AND ALBUTEROL SULFATE 1 AMPULE: .5; 3 SOLUTION RESPIRATORY (INHALATION) at 07:54

## 2021-01-01 RX ADMIN — AMOXICILLIN AND CLAVULANATE POTASSIUM 1 TABLET: 875; 125 TABLET, FILM COATED ORAL at 13:22

## 2021-01-01 RX ADMIN — PIPERACILLIN AND TAZOBACTAM 3375 MG: 3; .375 INJECTION, POWDER, LYOPHILIZED, FOR SOLUTION INTRAVENOUS at 06:28

## 2021-01-01 RX ADMIN — ACETAMINOPHEN 500 MG: 500 TABLET ORAL at 05:45

## 2021-01-01 RX ADMIN — FENTANYL CITRATE 25 MCG: 50 INJECTION, SOLUTION INTRAMUSCULAR; INTRAVENOUS at 22:01

## 2021-01-01 RX ADMIN — HYDRALAZINE HYDROCHLORIDE 75 MG: 50 TABLET, FILM COATED ORAL at 16:10

## 2021-01-01 RX ADMIN — TRAMADOL HYDROCHLORIDE 50 MG: 50 TABLET, FILM COATED ORAL at 09:18

## 2021-01-01 RX ADMIN — Medication 40 MG: at 17:35

## 2021-01-01 RX ADMIN — IPRATROPIUM BROMIDE AND ALBUTEROL SULFATE 1 AMPULE: .5; 3 SOLUTION RESPIRATORY (INHALATION) at 12:36

## 2021-01-01 RX ADMIN — HYDROCODONE BITARTRATE AND ACETAMINOPHEN 1 TABLET: 5; 325 TABLET ORAL at 13:53

## 2021-01-01 RX ADMIN — ACETAMINOPHEN 500 MG: 500 TABLET ORAL at 18:52

## 2021-01-01 RX ADMIN — FERROUS SULFATE TAB 325 MG (65 MG ELEMENTAL FE) 325 MG: 325 (65 FE) TAB at 20:22

## 2021-01-01 RX ADMIN — METOPROLOL TARTRATE 25 MG: 25 TABLET ORAL at 20:54

## 2021-01-01 RX ADMIN — IPRATROPIUM BROMIDE AND ALBUTEROL SULFATE 1 AMPULE: .5; 3 SOLUTION RESPIRATORY (INHALATION) at 09:15

## 2021-01-01 RX ADMIN — QUETIAPINE FUMARATE 25 MG: 25 TABLET ORAL at 09:31

## 2021-01-01 RX ADMIN — QUETIAPINE FUMARATE 25 MG: 25 TABLET ORAL at 21:55

## 2021-01-01 RX ADMIN — AMOXICILLIN AND CLAVULANATE POTASSIUM 1 TABLET: 875; 125 TABLET, FILM COATED ORAL at 23:41

## 2021-01-01 RX ADMIN — DONEPEZIL HYDROCHLORIDE 5 MG: 5 TABLET, FILM COATED ORAL at 16:36

## 2021-01-01 RX ADMIN — METOPROLOL TARTRATE 25 MG: 25 TABLET ORAL at 08:05

## 2021-01-01 RX ADMIN — SODIUM CHLORIDE: 9 INJECTION, SOLUTION INTRAVENOUS at 13:52

## 2021-01-01 RX ADMIN — TRAMADOL HYDROCHLORIDE 25 MG: 50 TABLET, FILM COATED ORAL at 02:28

## 2021-01-01 RX ADMIN — AMLODIPINE BESYLATE 2.5 MG: 5 TABLET ORAL at 10:24

## 2021-01-01 RX ADMIN — DOCUSATE SODIUM 100 MG: 100 CAPSULE, LIQUID FILLED ORAL at 08:43

## 2021-01-01 RX ADMIN — HYDRALAZINE HYDROCHLORIDE 75 MG: 50 TABLET, FILM COATED ORAL at 04:04

## 2021-01-01 RX ADMIN — METOPROLOL TARTRATE 25 MG: 25 TABLET, FILM COATED ORAL at 09:25

## 2021-01-01 RX ADMIN — DOCUSATE SODIUM 100 MG: 100 CAPSULE, LIQUID FILLED ORAL at 09:39

## 2021-01-01 RX ADMIN — HYDRALAZINE HYDROCHLORIDE 75 MG: 50 TABLET, FILM COATED ORAL at 13:06

## 2021-01-01 ASSESSMENT — ENCOUNTER SYMPTOMS
SHORTNESS OF BREATH: 0
COUGH: 0
DIARRHEA: 0
DIARRHEA: 0
BACK PAIN: 1
STRIDOR: 0
ABDOMINAL DISTENTION: 0
SORE THROAT: 0
ABDOMINAL PAIN: 0
ABDOMINAL DISTENTION: 0
ABDOMINAL DISTENTION: 0
SHORTNESS OF BREATH: 0
CONSTIPATION: 0
ABDOMINAL PAIN: 0
NAUSEA: 1
BACK PAIN: 1
EYE PAIN: 0
CHEST TIGHTNESS: 0
COUGH: 0
NAUSEA: 0
FACIAL SWELLING: 0
WHEEZING: 0
COUGH: 0
BACK PAIN: 1
SHORTNESS OF BREATH: 0
EYE PAIN: 0
EYE ITCHING: 0
CHOKING: 0
ABDOMINAL PAIN: 0
RHINORRHEA: 0
ROS SKIN COMMENTS: SCALP LACERATION
COUGH: 0
APNEA: 0
PHOTOPHOBIA: 0
APNEA: 0
CHEST TIGHTNESS: 0
WHEEZING: 0
ABDOMINAL PAIN: 0
SHORTNESS OF BREATH: 0
ABDOMINAL PAIN: 0
CHEST TIGHTNESS: 0
COLOR CHANGE: 0
BACK PAIN: 1
EYE DISCHARGE: 0
VOMITING: 0
SHORTNESS OF BREATH: 0
VOMITING: 1
BACK PAIN: 1
RHINORRHEA: 0

## 2021-01-01 ASSESSMENT — PAIN DESCRIPTION - PROGRESSION
CLINICAL_PROGRESSION: NOT CHANGED
CLINICAL_PROGRESSION: GRADUALLY WORSENING
CLINICAL_PROGRESSION: GRADUALLY IMPROVING
CLINICAL_PROGRESSION: GRADUALLY IMPROVING
CLINICAL_PROGRESSION: GRADUALLY WORSENING
CLINICAL_PROGRESSION: NOT CHANGED
CLINICAL_PROGRESSION: NOT CHANGED
CLINICAL_PROGRESSION: GRADUALLY IMPROVING
CLINICAL_PROGRESSION: NOT CHANGED
CLINICAL_PROGRESSION: GRADUALLY IMPROVING
CLINICAL_PROGRESSION: GRADUALLY WORSENING
CLINICAL_PROGRESSION: NOT CHANGED
CLINICAL_PROGRESSION: GRADUALLY WORSENING
CLINICAL_PROGRESSION: NOT CHANGED
CLINICAL_PROGRESSION: NOT CHANGED
CLINICAL_PROGRESSION: GRADUALLY WORSENING
CLINICAL_PROGRESSION: GRADUALLY IMPROVING
CLINICAL_PROGRESSION: GRADUALLY IMPROVING
CLINICAL_PROGRESSION: NOT CHANGED
CLINICAL_PROGRESSION: GRADUALLY IMPROVING
CLINICAL_PROGRESSION: GRADUALLY WORSENING
CLINICAL_PROGRESSION: GRADUALLY WORSENING
CLINICAL_PROGRESSION: NOT CHANGED
CLINICAL_PROGRESSION: GRADUALLY IMPROVING
CLINICAL_PROGRESSION: NOT CHANGED

## 2021-01-01 ASSESSMENT — PAIN DESCRIPTION - LOCATION
LOCATION: BACK
LOCATION: BACK;SACRUM
LOCATION: BACK
LOCATION: SACRUM;BACK
LOCATION: OTHER (COMMENT)
LOCATION: BACK

## 2021-01-01 ASSESSMENT — PAIN SCALES - GENERAL
PAINLEVEL_OUTOF10: 10
PAINLEVEL_OUTOF10: 10
PAINLEVEL_OUTOF10: 0
PAINLEVEL_OUTOF10: 10
PAINLEVEL_OUTOF10: 6
PAINLEVEL_OUTOF10: 10
PAINLEVEL_OUTOF10: 6
PAINLEVEL_OUTOF10: 7
PAINLEVEL_OUTOF10: 10
PAINLEVEL_OUTOF10: 0
PAINLEVEL_OUTOF10: 8
PAINLEVEL_OUTOF10: 0
PAINLEVEL_OUTOF10: 2
PAINLEVEL_OUTOF10: 10
PAINLEVEL_OUTOF10: 10
PAINLEVEL_OUTOF10: 8
PAINLEVEL_OUTOF10: 0
PAINLEVEL_OUTOF10: 5
PAINLEVEL_OUTOF10: 7
PAINLEVEL_OUTOF10: 6
PAINLEVEL_OUTOF10: 5
PAINLEVEL_OUTOF10: 1
PAINLEVEL_OUTOF10: 10
PAINLEVEL_OUTOF10: 8
PAINLEVEL_OUTOF10: 8
PAINLEVEL_OUTOF10: 0
PAINLEVEL_OUTOF10: 0
PAINLEVEL_OUTOF10: 7
PAINLEVEL_OUTOF10: 6
PAINLEVEL_OUTOF10: 8
PAINLEVEL_OUTOF10: 6
PAINLEVEL_OUTOF10: 0
PAINLEVEL_OUTOF10: 2
PAINLEVEL_OUTOF10: 10
PAINLEVEL_OUTOF10: 5
PAINLEVEL_OUTOF10: 0
PAINLEVEL_OUTOF10: 6
PAINLEVEL_OUTOF10: 8
PAINLEVEL_OUTOF10: 8
PAINLEVEL_OUTOF10: 4
PAINLEVEL_OUTOF10: 6
PAINLEVEL_OUTOF10: 5
PAINLEVEL_OUTOF10: 10
PAINLEVEL_OUTOF10: 10
PAINLEVEL_OUTOF10: 0
PAINLEVEL_OUTOF10: 10
PAINLEVEL_OUTOF10: 10
PAINLEVEL_OUTOF10: 0
PAINLEVEL_OUTOF10: 2
PAINLEVEL_OUTOF10: 6
PAINLEVEL_OUTOF10: 10
PAINLEVEL_OUTOF10: 10
PAINLEVEL_OUTOF10: 4
PAINLEVEL_OUTOF10: 6
PAINLEVEL_OUTOF10: 8
PAINLEVEL_OUTOF10: 5
PAINLEVEL_OUTOF10: 5
PAINLEVEL_OUTOF10: 4
PAINLEVEL_OUTOF10: 0
PAINLEVEL_OUTOF10: 8
PAINLEVEL_OUTOF10: 3
PAINLEVEL_OUTOF10: 5
PAINLEVEL_OUTOF10: 0
PAINLEVEL_OUTOF10: 10
PAINLEVEL_OUTOF10: 0
PAINLEVEL_OUTOF10: 8
PAINLEVEL_OUTOF10: 7
PAINLEVEL_OUTOF10: 10
PAINLEVEL_OUTOF10: 0

## 2021-01-01 ASSESSMENT — PAIN - FUNCTIONAL ASSESSMENT
PAIN_FUNCTIONAL_ASSESSMENT: ACTIVITIES ARE NOT PREVENTED
PAIN_FUNCTIONAL_ASSESSMENT: PREVENTS OR INTERFERES WITH MANY ACTIVE NOT PASSIVE ACTIVITIES
PAIN_FUNCTIONAL_ASSESSMENT: PREVENTS OR INTERFERES WITH MANY ACTIVE NOT PASSIVE ACTIVITIES
PAIN_FUNCTIONAL_ASSESSMENT: PREVENTS OR INTERFERES SOME ACTIVE ACTIVITIES AND ADLS
PAIN_FUNCTIONAL_ASSESSMENT: PREVENTS OR INTERFERES SOME ACTIVE ACTIVITIES AND ADLS
PAIN_FUNCTIONAL_ASSESSMENT: ACTIVITIES ARE NOT PREVENTED
PAIN_FUNCTIONAL_ASSESSMENT: PREVENTS OR INTERFERES WITH MANY ACTIVE NOT PASSIVE ACTIVITIES
PAIN_FUNCTIONAL_ASSESSMENT: PREVENTS OR INTERFERES SOME ACTIVE ACTIVITIES AND ADLS
PAIN_FUNCTIONAL_ASSESSMENT: PREVENTS OR INTERFERES SOME ACTIVE ACTIVITIES AND ADLS

## 2021-01-01 ASSESSMENT — PAIN DESCRIPTION - DESCRIPTORS
DESCRIPTORS: ACHING;SORE
DESCRIPTORS: ACHING;SORE
DESCRIPTORS: SORE;ACHING
DESCRIPTORS: ACHING;SORE
DESCRIPTORS: ACHING;SORE
DESCRIPTORS: ACHING;SHARP
DESCRIPTORS: SHARP
DESCRIPTORS: SHARP;ACHING

## 2021-01-01 ASSESSMENT — PAIN DESCRIPTION - PAIN TYPE
TYPE: ACUTE PAIN;SURGICAL PAIN
TYPE: ACUTE PAIN
TYPE: CHRONIC PAIN
TYPE: ACUTE PAIN
TYPE: CHRONIC PAIN

## 2021-01-01 ASSESSMENT — PULMONARY FUNCTION TESTS
PIF_VALUE: 0
PIF_VALUE: 14
PIF_VALUE: 14
PIF_VALUE: 2
PIF_VALUE: 18
PIF_VALUE: 20
PIF_VALUE: 13
PIF_VALUE: 14
PIF_VALUE: 19
PIF_VALUE: 14
PIF_VALUE: 13
PIF_VALUE: 0
PIF_VALUE: 13
PIF_VALUE: 10
PIF_VALUE: 13
PIF_VALUE: 15
PIF_VALUE: 14
PIF_VALUE: 14
PIF_VALUE: 13
PIF_VALUE: 0
PIF_VALUE: 13
PIF_VALUE: 0
PIF_VALUE: 14
PIF_VALUE: 6
PIF_VALUE: 14
PIF_VALUE: 13
PIF_VALUE: 13
PIF_VALUE: 12
PIF_VALUE: 13
PIF_VALUE: 0
PIF_VALUE: 13
PIF_VALUE: 13
PIF_VALUE: 14
PIF_VALUE: 13
PIF_VALUE: 1
PIF_VALUE: 14
PIF_VALUE: 14

## 2021-01-01 ASSESSMENT — PAIN DESCRIPTION - ORIENTATION
ORIENTATION: MID
ORIENTATION: LOWER
ORIENTATION: MID;LOWER
ORIENTATION: LOWER

## 2021-01-01 ASSESSMENT — PAIN DESCRIPTION - DIRECTION
RADIATING_TOWARDS: BLE
RADIATING_TOWARDS: BLES
RADIATING_TOWARDS: NO

## 2021-01-01 ASSESSMENT — PAIN DESCRIPTION - FREQUENCY
FREQUENCY: CONTINUOUS
FREQUENCY: INTERMITTENT
FREQUENCY: CONTINUOUS
FREQUENCY: INTERMITTENT
FREQUENCY: INTERMITTENT
FREQUENCY: CONTINUOUS
FREQUENCY: INTERMITTENT
FREQUENCY: INTERMITTENT

## 2021-01-01 ASSESSMENT — PAIN DESCRIPTION - ONSET
ONSET: ON-GOING

## 2021-03-05 NOTE — ED NOTES
Bed: 005A  Expected date:   Expected time:   Means of arrival: [facility]  Comments:     Meryle Piedmont, RN  03/05/21 1424

## 2021-03-06 PROBLEM — S09.90XA CLOSED HEAD INJURY: Status: ACTIVE | Noted: 2021-01-01

## 2021-03-06 PROBLEM — S01.01XA SCALP LACERATION, INITIAL ENCOUNTER: Status: ACTIVE | Noted: 2021-01-01
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
## 2021-03-06 PROBLEM — S32.10XA SACRAL FRACTURE (HCC): Status: ACTIVE | Noted: 2021-01-01
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
## 2021-03-06 NOTE — ED NOTES
Upon first contact with patient this RN receives bedside shift report from PATSY Enrique. Pt resting on cot with unlabored respirations.         Ariel SCANLON RN  03/05/21 1924

## 2021-03-06 NOTE — PROGRESS NOTES
Thelma Esparza  Daily Progress Note  Pt Name: Red Pedroza  Medical Record Number: [id number]  Date of Birth 10/3/1931   Today's Date: 3/6/2021    HD: # 0    CC:\" I just feel sore\"    ASSESSMENT  1. Active Hospital Problems    Diagnosis Date Noted    Fall at home, initial encounter [W19Mague Lopez, Y14.766] 03/06/2021    Closed head injury [S09.90XA] 03/06/2021    Scalp laceration, initial encounter [S01.01XA] 03/06/2021    Sacral fracture ([address] [S32.10XA] 03/06/2021         PLAN  Patient admitted under Trauma Services      Fall at home              - PT/OT              - Up with assistance              - Fall risk precautions     Closed head injury, parietal scalp laceration              - SLP cog eval              - Neuro checks              - Pain control              - Local wound care              - Remove staples in 1-2 weeks     Sacral fracture at S2 level              - Orthopedic spine consulted              - Bedrest till spine evaluates              - Pain control              - Neuro checks     History of Hypertension, CHF, CKD, and Neurogenic bladder              - Restart home blood pressure medications               - Repeat labs in AM              - Chronic sanches in place              - Continue to monitor     Consults: Orthopedic spine     Pain Management              -Tylenol     Prophylaxis: SCD's, Incentive Spirometry, Colace, Pepcid, Zofran     NPO till spine evaluates     IVF Management  Regular Neurovascular Checks  Repeat Labs Tomorrow AM  PT/OT/SLP Eval and Treat     Planned Discharge to pending clinical course         SUBJECTIVE  She is a 80 y.o. female who continues to present at [address] [place] [address] on [address] [facility] following a mechanical fall at home. Patient reports no specific pain, though states she is sore in the low back. Interview difficult secondary to patient being hard of hearing.   Patient denies chest pain, shortness of breath, headache, numbness, paraesthesias, weakness, nausea, vomiting, back pain. Plan for patient to be evaluated by orthopedic spine, work with PT/OT when cleared. Hemoglobin stable, WBC stable, no electrolyte abnormality, CT studies reviewed, vital signs stable on exam. Care in coordination with trauma surgeon Dr. James Koroma. Wt Readings from Last 3 Encounters:   03/06/21 139 lb 3.2 oz (63.1 kg)   05/14/19 168 lb (76.2 kg)   05/06/19 180 lb 2 oz (81.7 kg)     Temp Readings from Last 3 Encounters:   03/06/21 98.6 °F (37 °C) (Oral)   05/18/19 98.1 °F (36.7 °C) (Oral)   06/07/18 98.4 °F (36.9 °C)     BP Readings from Last 3 Encounters:   03/06/21 (!) 155/68   12/15/20 (!) 182/71   05/18/19 (!) 128/59     Pulse Readings from Last 3 Encounters:   03/06/21 93   12/15/20 59   05/18/19 63       24 HR INTAKE/OUTPUT :     Intake/Output Summary (Last 24 hours) at 3/6/2021 0748  Last data filed at 3/6/2021 0426  Gross per 24 hour   Intake 271.6 ml   Output 650 ml   Net -378.4 ml     Diet NPO Effective Now    OBJECTIVE  CURRENT VITALS BP (!) 155/68   Pulse 93   Temp 98.6 °F (37 °C) (Oral)   Resp 18   Ht 5' 11\" (1.803 m)   Wt 139 lb 3.2 oz (63.1 kg)   SpO2 95%   BMI 19.41 kg/m²   GENERAL: Presents semi-Fowlers in bed, awake and alert; In no acute distress and well nourished. SKIN: Appropriate for ethnicity, warm and dry. EYES: No apparent trauma, discharge, or hematoma bilaterally. PERRL at 3mm  CARDIO: No visible chest wall deformity. No heaves or lifts. Strong/regular S1/S2 rate and rhythm. No rubs murmurs, or gallops. 2+ radial and dorsalis pedal pulses. Capillary refill <2 sec. No extremity edema noted. PULMONARY:  Trachea midline, no audible wheezing, increased respiratory effort, accessory muscle use, or asymmetrical chest wall movement. Lung sounds are clear to ascultation in superior and inferior fields. No wheezing, crackles, or rhonchi. ABDOMEN: Abdomen is non distended. Bowel sounds present in all four quadrants.  Soft and non tender to palpation in all quadrants. NEURO: Follows commands. PMS intact, moves limbs freely. No focal neurological deficits  MSK: Extremities intact and present. No new bruising, swelling, deformity, discoloration or bleeding. No new tenderness to muscular or bony structure palpation.  strength 5/5 and equal bilaterally. WOUND: Laceration to posterior scalp with staples in place. No surrounding erythema, edema, purulent discharge or active bleeding        LABS  CBC :   Recent Labs     03/05/21 2010 03/06/21  0430   WBC 10.8 10.0   HGB 13.1 10.7*   HCT 41.2 33.8*   MCV 98.1 97.4    133     BMP:   Recent Labs     03/05/21 2010 03/06/21  0430   * 137   K 4.3 4.3   CL 99 105   CO2 26 26   BUN 13 12   CREATININE 0.7 0.6     COAGS:   Recent Labs     03/05/21 2010   PROT 6.8     Pancreas/HFP:  No results for input(s): LIPASE, AMYLASE in the last 72 hours. Recent Labs     03/05/21 2010   AST 19   ALT 13   BILITOT 0.3   ALKPHOS 81           Electronically signed by Amirah Collins PA-C on 3/6/2021 at 7:48 AM   Patient seen and examined independently by me. Above discussed and I agree with KASIE Anderson. See my additional comments below for updated orders and plan. Labs, cultures, and radiographs where available were reviewed. I discussed patient concerns with the patient's nurse and instructions were given. Please see our orders for the updated patient care plan. -Laceration/wound care. Cognition evaluation. Speech therapy. PT/OT. Orthopedic spine. Bedrest until seen by spine team. Advance diet when okay with consultants. A.m. labs. Seems stable today.     Electronically signed by Vivek Aquino MD on 3/6/21 at 2:03 PM EST

## 2021-03-06 NOTE — ED NOTES
Dr. Margaret Figueroa at bedside to updated pt and family. Pt respirations unlabored.       Maria Eugenia THOMAS RN  03/05/21 8331

## 2021-03-06 NOTE — PROGRESS NOTES
Pt admitted to [facility] via cart/stretcher. Complaints: fall with head laceration, sacrum fracture, and T3-5 fractures. IV site free of s/s of infection or infiltration. Vital signs obtained. Assessment and data collection initiated. Two nurse skin assessment performed by Francine Carrera and Gerhardt Creamer RN. Oriented to room. Explained patients right to have family, representative or physician notified of their admission. Patient has Declined for physician to be notified. Patient has Declined for family/representative to be notified. The patient is interested in [facility]. Teres meds to beds program?:  No    Policies and procedures for  explained. All questions answered with no further questions at this time. Fall prevention and safety brochure discussed with patient. Bed alarm on. Call light in reach.

## 2021-03-06 NOTE — PROGRESS NOTES
Reviewed chart for SBIRT per trauma services. Per Epic, \"patient with some baseline confusion\".  Unable to complete

## 2021-03-06 NOTE — ED NOTES
Pt resting on cot with family at bedside. Pt respirations unlabored. Pt and family deny any needs at this time.       Froylan Hayes [id number]  03/05/21 4442

## 2021-03-06 NOTE — PROGRESS NOTES
Consult note dictated. S2 fracture non-op. Ok to advance activity as tolerated. Ok to advance diet from orthospine standpoint.

## 2021-03-06 NOTE — ED NOTES
Dr. Mitch Keith at bedside to staple pt head laceration. Pt tolerated well. Pt granddaughter at bedside.       Tristen Galeana BPTXPATSY LOPEZ  03/05/21 2534

## 2021-03-06 NOTE — PROGRESS NOTES
Michelle Ferguson 60  INPATIENT OCCUPATIONAL THERAPY  [facility] 7K  EVALUATION    Time:   Time In: 1050  Time Out: 1120  Timed Code Treatment Minutes: 15 Minutes  Minutes: 30          Date: 3/6/2021  Patient Name: Colonel Roth,   Gender: female      MRN: [id number]  : 10/3/1931  (80 y.o.)  Referring Practitioner: Roselyn Flores PA-C  Diagnosis: Fall at Home  Additional Pertinent Hx: Pt admitted after having had a mechanical fall while at home. She had suffered S2 fx and CHI with scalp laceration. She had repair if her scalp laceration while at ER. She was seen by orthopedics and it was determined that her S2 fracture was not operable. She was cleared to participate in therapy as much as she tolerates. Restrictions/Precautions:  Restrictions/Precautions: Weight Bearing  Right Lower Extremity Weight Bearing: Weight Bearing As Tolerated  Left Lower Extremity Weight Bearing: Weight Bearing As Tolerated  Position Activity Restriction  Other position/activity restrictions: Scalp wound repair    Subjective       Subjective: Pleasant. Anxious about moving around. Comments: RN approved session. Pt requested a pain medication during attempt at geting up. She was provided with a tylenol. Pain:  Pain Assessment  Patient Currently in Pain: Yes  Pain Assessment: 0-10  Pain Level: 7  Pain Type: Acute pain  Pain Location: Back  Pain Orientation: Lower  Pain Radiating Towards: BLEs  Pain Descriptors: Rosezella Aldo; Aching  Pain Frequency: Intermittent  Clinical Progression: Not changed  Patient's Stated Pain Goal: No pain  Response to Pain Intervention: Patient Satisfied  Multiple Pain Sites: No    Vitals: Vitals not assessed per clinical judgement, see nursing flowsheet    Social/Functional History:  Lives With: Alone  Type of Home: Apartment  Home Layout: One level  Home Access: Level entry  Home Equipment: Wheelchair-manual, Rolling walker   Bathroom Accessibility: Accessible    Receives Help From: Family  ADL Assistance: Independent  Homemaking Assistance: Needs assistance  Homemaking Responsibilities: Yes  Ambulation Assistance: Independent  Transfer Assistance: Independent    Active : No  Patient's  Info: son or daughter in law  Occupation: Retired  Type of occupation: Worked at [employer]: [employer], [employer]  Additional Comments: Pt was ambulating short distances with the rolling walker. She would use the W/C for some activities such as transporting her laundry to/from the laundry room. Pt has delivered meals 7x/wk. Cognition/Orientation:  Overall Orientation Status: Impaired  Orientation Level: Oriented to situation, Oriented to place, Oriented to person, Disoriented to time  Overall Cognitive Status: Exceptions  Cognition Comment: Pt was forgetful during session. She could recall what she had ordered for lunch, however. Cues needed for problem solving her situation currently. ADL's:  Feeding: Supervision(removed lids and opened packets for items on her lunch tray)  LE Dressing: Maximum assistance(Donning slipper socks)  Additional Comments: Pt has sanches catheter at home which she switches to a leg bag each day. .  Vision - Basic Assessment  Prior Vision: Wears glasses all the time    Functional Mobility:  Bed mobility  Supine to Sit: Stand by assistance(while having head of bed elevated and using the bedrail)  Sit to Supine: Minimal assistance(verbal cues needed for using the bedrail and assist for lifting her legs into the bed)  Scooting: Minimal assistance(pt needed help with coming forward at the edge of bed seconary to pain)  Comment: Pt used the mechanical draw sheet to get boosted up in the bed. Functional Mobility  Functional Mobility Comments: Not able to demonstrate.      Balance:  Balance  Sitting Balance: Stand by assistance  Standing Balance: Unable to assess(comment)  Standing Balance  Time: Not applicable  Comment: Pt was refusing to attempt standing at this time secondary to pain levels. Transfers:  Sit to stand: Unable to assess  Transfer Comments: Pt had pain with bed mobility and sitting and refused to attempt standing at this time. Upper Extremity Assessment:Hand Dominance: Right  LUE AROM : WFL  Left Hand AROM: WFL  RUE AROM : WFL  Right Hand AROM: WFL    LUE Strength  L Hand General: 4/5  LUE Strength Comment: 3+/5 deltoid; 3+/5 pectoral; 4/5 biceps/triceps    RUE Strength  R Hand General: 4/5  RUE Strength Comment: 3+/5 deltoid; 3+/5 pectoral; 4/5 biceps/triceps      Sensation  Overall Sensation Status: [facility]  Fine Motor Skills  Fine Motor Comment: No difficulty noted with doing self care tasks with B hands. Activity Tolerance: Patient limited by pain, Patient limited by fatigue  Pt was having increased pain with mobility. She also had anxiety and SOB. She returned to supine following session. Cues provided for relaxed breathing while getting back into bed. Her pain was decreasing once she was situated in supine and starting to eat her lunch. Assessment:  Assessment: Patient would benefit from continued skilled OT services to address above deficits. She presents with injuries suffered from a mechanical fall at home. She had a S2 fracture what was inoperable, per orthopedic consult. She was independent with self care prior to admission and also walked with a rolling walker for short distances prior to admission. She had delivered meals provided daily. She also has a sanches catheter prior to her admission. She demonstrated sitting at the edge of bed. Her bed mobility was painful and she needed MIN A at this time. She refused doing any transfer at this time secondary to pain levels. She did self care while in supine but had help with donning her slipper socks while sitting at the edge of bed.       Performance deficits / Impairments: Decreased functional mobility , Decreased ADL status, Decreased strength, Decreased endurance, edge of bed; upper body exercises and relaxed breathing    Goals:  Patient goals : \"I want to get rid of this pain and be able to get around and do things again on my own. \" pt states. Short term goals  Time Frame for Short term goals: By discharge  Short term goal 1: Pt will complete BUE AROM/moderate resistance exercises while in supine and head of bed elevated to increase her endurance and strength for ease of doing self care and functional mobility. Short term goal 2: Pt will complete transfers with OTR to prepare for doing self care including her toileting routine. Short term goal 3: Pt will complete simple ADLs while in sitting for over 8 minute duration with Setup A while using any adaptations neede to increase her independence with self care. See long-term goal time frame for expected duration of plan of care. If no long-term goals established, a short length of stay is anticipated. Following session, patient left in safe position with all fall risk precautions in place.

## 2021-03-06 NOTE — PROGRESS NOTES
[facility] MISSED TREATMENT NOTE  [facility]K      Date: 3/6/2021  Patient Name: Brian Saldivar        MRN: [id number]    : 10/3/1931  (80 y.o.)    REASON FOR MISSED TREATMENT: Patient was NPO this AM. Upon ST calling RN to check status of patient; RN reported completion of swallow screen with MD permission-- resulting in diet initiation to regular solids/with thin liquids. Per RN, the pt has already completed a meal without difficulties and is currently working with OT. ST to hold clinical swallow evaluation this date and complete when pt is available and/or medically appropriate. Pepe Brown MA., CCC-SLP

## 2021-03-06 NOTE — PROGRESS NOTES
[address]  PHYSICAL THERAPY MISSED TREATMENT NOTE  [facility]    Date: 3/6/2021  Patient Name: Red Pedroza        MRN: [id number]   : 10/3/1931  (80 y.o.)  Gender: female                REASON FOR MISSED TREATMENT:  Hold treatment per nursing request.      Per Nursing, Hold PT until Mary Ellen Mayorga sees patient.      [facility] PT, DPT [id number]

## 2021-03-06 NOTE — ED NOTES
ED to inpatient nurses report    Chief Complaint   Patient presents with    Fall    Head Laceration    Back Pain      Present to ED from nursing home  LOC: alert and orientated to name, place, date  Vital signs   Vitals:    03/05/21 2201 03/05/21 2246 03/05/21 2345 03/06/21 0045   BP: (!) 156/63 106/77 (!) 152/86 (!) 162/62   Pulse: 67 66 70 70   Resp: 17 23 24 22   Temp:       TempSrc:       SpO2: 95% 96% 94% 95%   Weight:       Height:          Oxygen Baseline room air    Current needs required room air Bipap/Cpap No  LDAs:   Peripheral IV 03/05/21 Left Forearm (Active)   Site Assessment Clean;Dry; Intact 03/05/21 2003   Line Status Brisk blood return;Normal saline locked;Specimen collected 03/05/21 2003   Dressing Status Clean;Dry; Intact 03/05/21 2003   Dressing Intervention New 03/05/21 2003     Mobility: Requires assistance * 2  Pending ED orders: none  Present condition: stable      Electronically signed by Barbra Hackett RN on 3/6/2021 at 12:59 AM       Barbra Hackett RN  03/06/21 0100

## 2021-03-06 NOTE — CONSULTS
physical therapy and plan on further  placement beyond the hospital setting. DRUG ALLERGIES: No known allergies for the patient. PAST MEDICAL HISTORY:  Includes carotid artery disease, insomnia,  osteoporosis, left bundle-branch block, hypertension, history of breast  cancer, chronic kidney disease, chronic diastolic heart failure, and  gastroesophageal reflux disease. CURRENT HOME MEDICATIONS:  Include hydralazine, Bactrim, Desyrel, iron,  Tylenol, and metoprolol. PAST SURGICAL HISTORY:  Includes mastectomy, right in 1987, left in  12; lumbar disk arthroplasty in 1989 and 1998; total hip arthroplasty  in 1994 and 1996; knee arthroplasty in 1998; cataract removal;  colonoscopy; lumbar laminectomy in 2012; shoulder surgery in 2012;  breast surgery; and previous fracture surgeries. SOCIAL HISTORY:  She is a former smoker. She denies any alcohol use. She denies illicit drug use. She lives at home alone and does not have  any caretakers, anybody would be able to help her on a daily basis. REVIEW OF SYSTEMS:  Negative unless otherwise stated in the HPI. PHYSICAL EXAMINATION:  VITAL SIGNS:  Most recent vital signs show temperature 98.1 degrees,  respirations 18, pulse 62, blood pressure 160/70. She complains of pain  at a level of 2/10 currently. GENERAL:  The patient is pleasant, cooperative, awake, alert, and  oriented x3. She is seated in her hospital bed. She is in no acute  distress. She does have a head microphone device set up for better  communication. She is hard of hearing, but it did seem we are able to  communicate effectively with the use of this microphone. LUMBAR SPINE:  No open wounds or lesions are noted. She is mildly  tender in the midline, but no exquisite point tenderness in the midline  or paraspinal muscle. No open wounds are appreciated. EXTREMITIES:  Bilateral lower extremities, skin is warm, dry, intact. Pulses +2 at the ankles.   Calves are nontender without evidence of DVT. Pedal push and pull intact, 5/5 knee extension and great toe extension  also intact. Sensation is intact throughout. ASSESSMENT:  S2 fracture. PLAN:  I have reviewed the images with Dr. Holly Bobby and discussed with the  plan, which will include conservative treatment and advancement with  physical therapy and occupational therapy without restriction. She will  be able to continue to advance and will most likely require either rehab  or nursing home placement before returning home alone. Pain management  will be managed by the primary team.  There is also mention of a  thoracic spine CT scan with some possible osteoporotic compression  fractures. At this point, I do not believe those are any type of acute  fractures. She has no thoracic tenderness to palpation and complains of  no upper thoracic pain. CONCLUSION:  The patient is an 80-year female who took a fall at home  and shows evidence of S2 fracture. She has been followed by the Trauma  Team for a closed head injury as well. From the orthopedic standpoint,  she will be able to advance as tolerated with Physical Therapy and  Occupational Therapy without any particular restrictions. Again, I  anticipate her needs may include consideration of rehab or a stop at a  nursing facility before she is able to return home and be completely  independent. This will be pending her improvement with Physical Therapy  over the next several days. We will continue to follow. Thank you for the consultation.         You Bravo    D: 03/06/2021 10:16:34       T: 03/06/2021 10:28:02     [email or web address]  Job#: [id number]     Doc#: [id number]    CC:
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
## 2021-03-07 NOTE — PROGRESS NOTES
[facility]  INPATIENT PHYSICAL THERAPY  EVALUATION  [facility] ORTHOPEDICS 7K - 7K-05/005-A    Time In: 3877  Time Out: 0901  Timed Code Treatment Minutes: 23 Minutes  Minutes: 37          Date: 3/7/2021  Patient Name: Petr Wright,  Gender:  female        MRN: [id number]  : 10/3/1931  (80 y.o.)      Referring Practitioner: Tita Mcgee PA-C  Diagnosis: Fall at home, initial encounter  Additional Pertinent Hx: Pt admitted after having had a mechanical fall while at home. She had suffered S2 fx and CHI with scalp laceration. She had repair if her scalp laceration while at ER. She was seen by orthopedics and it was determined that her S2 fracture was not operable. She was cleared to participate in therapy as much as she tolerates. Restrictions/Precautions:  Restrictions/Precautions: Weight Bearing, General Precautions, Fall Risk  Right Lower Extremity Weight Bearing: Weight Bearing As Tolerated  Left Lower Extremity Weight Bearing: Weight Bearing As Tolerated  Position Activity Restriction  Other position/activity restrictions: Scalp wound repair    Subjective:  Chart Reviewed: Yes  Patient assessed for rehabilitation services?: Yes  Family / Caregiver Present: No  Subjective: RN approved session. Patient laying in bed upon arrival and agreeable to therapy. Patient is very [facility] and uses an amplifier. At the conclusion of therapy, patient left laying in bed with call light in reach and bed alarm on.      General:  Overall Orientation Status: Within Functional Limits    Vision: Impaired  Vision Exceptions: Wears glasses at all times    Hearing: Exceptions to [facility]  Hearing Exceptions: Hard of hearing/hearing concerns         Pain: 2/10: back, at rest; 10/10 with activity         Social/Functional History:    Lives With: Alone  Type of Home: Apartment  Home Layout: One level  Home Access: Level entry  Home Equipment: Wheelchair-manual, Rolling walker(occasional use of RW prior)     Bathroom Accessibility: Accessible    Receives Help From: Family  ADL Assistance: Independent  Homemaking Assistance: Needs assistance  Homemaking Responsibilities: Yes  Ambulation Assistance: Independent  Transfer Assistance: Independent    Active : No  Occupation: Retired  Type of occupation: Worked at [employer]  Additional Comments: Pt was ambulating short distances with the rolling walker. She would use the W/C for some activities such as transporting her laundry to/from the laundry room. Pt has delivered meals 7x/wk. OBJECTIVE:  Range of Motion:  Bilateral Lower Extremity: WFL    Strength:  Bilateral Lower Extremity: WFL    Balance:  Static Sitting Balance:  Stand By Assistance  Static Standing Balance: Contact Guard Assistance    Bed Mobility:  Supine to Sit: Contact Guard Assistance, with head of bed raised, with increased time for completion  Sit to Supine: Minimal Assistance, X 1, with increased time for completion     Transfers:  Sit to Stand: [address], X 1, with increased time for completion, cues for hand placement  Stand to [facility], X 1, with increased time for completion, cues for hand placement    Ambulation:  Contact Guard Assistance, X 1, with increased time for completion  Distance: 3'  Surface: Level Tile  Device:Rolling Walker  Gait Deviations:  Decreased Gait Speed, Decreased Heel Strike Bilaterally, Unsteady Gait and Antalgic    Exercise:  Patient was guided in 1 set(s) 10 reps of exercise to both lower extremities. Ankle pumps, Glut sets, Heelslides and Straight leg raises. Exercises were completed for increased independence with functional mobility. Functional Outcome Measures: Completed  AM-PAC Inpatient Mobility Raw Score : 17  AM-PAC Inpatient T-Scale Score : 42.13    ASSESSMENT:  Activity Tolerance:  Patient tolerance of  treatment: fair.  Patient motivated to complete mobility and get better but is limited by increase in pain with mobility. Treatment Initiated: Treatment and education initiated within context of evaluation. Evaluation time included review of current medical information, gathering information related to past medical, social and functional history, completion of standardized testing, formal and informal observation of tasks, assessment of data and development of plan of care and goals. Treatment time included skilled education and facilitation of tasks to increase safety and independence with functional mobility for improved independence and quality of life. Assessment: Body structures, Functions, Activity limitations: Decreased functional mobility , Increased pain, Decreased balance, Decreased strength, Decreased safe awareness, Decreased endurance  Assessment: Pt demonstrates a decrease in baseline by way of bed mobility, transfers and ambulation secondary to decreased activity tolerance, strength, fatigue, and balance deficits. Pt will benefit from skilled PT services throughout admission and beyond hospital discharge for improvements in functional mobility.      Prognosis: Good    REQUIRES PT FOLLOW UP: Yes    Discharge Recommendations:  Discharge Recommendations: Continue to assess pending progress, Patient would benefit from continued therapy after discharge, Subacute/Skilled Nursing Facility    Patient Education:  PT Education: Goals, PT Role, Plan of Care, Transfer Training, General Safety    Equipment Recommendations:  Equipment Needed: No    Plan:  Times per week: 5x O  Times per day: Daily  Current Treatment Recommendations: Strengthening, Home Exercise Program, Safety Education & Training, Balance Training, Endurance Training, Patient/Caregiver Education & Training, Functional Mobility Training, Gait Training, Transfer Training, Stair training    Goals:  Patient goals : to move my body  Short term goals  Time Frame for Short term goals: by discharge  Short term goal 1: bed mobility with HOB flat and no use of rails, mod ind to increase functional ind  Short term goal 2: sit <> stand from various surfaces with use of 2WW and supervision assist in order to increase functional ind  Short term goal 3: amb 48' with use of 2WW and supervison assist in order to safely navigate home  Long term goals  Time Frame for Long term goals : NA due to short ELOS     Following session, patient left in safe position with all fall risk precautions in place.     Zeke Henry PT, DPT [id number]

## 2021-03-07 NOTE — PLAN OF CARE
Problem: Pain:  Description: Pain management should include both nonpharmacologic and pharmacologic interventions.   Goal: Pain level will decrease  Description: Pain level will decrease  Outcome: Ongoing     Problem: Falls - Risk of:  Goal: Will remain free from falls  Description: Will remain free from falls  Outcome: Met This Shift  Note: Remained free from falls

## 2021-03-07 NOTE — PROGRESS NOTES
[address]jorn Pilot Dr. Leopold Andrea  Daily Progress Note  Pt Name: Isabela Mendoza  Medical Record Number: [id number]  Date of Birth 10/3/1931   Today's Date: 3/7/2021    HD: # 1    CC:\"It's going\"    ASSESSMENT  1. Active Hospital Problems    Diagnosis Date Noted    Fall at home, initial encounter [W19. Nura Esparza, O95.849] 03/06/2021    Closed head injury [S09.90XA] 03/06/2021    Scalp laceration, initial encounter [S01.01XA] 03/06/2021    Sacral fracture ([address] [S32.10XA] 03/06/2021         PLAN  Patient admitted under Trauma Services      Fall at home              - PT/OT              - Up with assistance              - Fall risk precautions     Closed head injury, parietal scalp laceration              - SLP cog eval              - Neuro checks              - Pain control              - Local wound care              - Remove staples in 1-2 weeks     Sacral fracture at S2 level              - Orthopedic spine consulted              - Okay to work with PT/OT              - Pain control              - Neuro checks   - Ortho spine signed off     History of Hypertension, CHF, CKD, and Neurogenic bladder              - Home blood pressure medications restarted               - Repeat labs in AM              - Chronic sanches in place    - Bactrim was prescribed on 3/2 to 3/12 for report UTI    - Bactrim reordered today    - UA ordered   - Home ferrous sulfate reordered, repeat CBC in AM              - Continue to monitor     Consults: Orthopedic spine     Pain Management              -Tylenol, added today Ultram to help pain control with therapy     Prophylaxis: SCD's, Incentive Spirometry, Colace, Pepcid, Zofran     General diet     IVF stopped  Regular Neurovascular Checks  Repeat Labs Tomorrow AM  PT/OT/SLP Eval and Treat     Planned Discharge to pending clinical course   - Social work consulted for discharge planning   - Increase activity with PT/OT         SUBJECTIVE  Patient seen on 7K. Patient endorsed doing all right this morning and denied any pain at rest but endorsed 10/10 low back pain when she tries to move. Patient stated she was unable to get up and move with therapy secondary to low back pain. Will added low dose ultram today to help increase activity. Patient denied any other pain or complaints. Denied any headaches, lightheadedness, dizziness, chest pain, shortness of breath, abdominal pain, nausea/vomiting, and paresthesias. On exam patient was awake, alert and oriented, and PMS intact in all 4 extremities. Patient stable from a trauma surgery standpoint. Orthopedic spine stated okay to work with PT/OT and signed off today. Social work consulted for discharge planning. Continue PT/OT/SLP and follow recommendations for discharge. Case discussed with trauma surgeon, Dr. Katelyn Funez. Wt Readings from Last 3 Encounters:   03/06/21 139 lb 3.2 oz (63.1 kg)   05/14/19 168 lb (76.2 kg)   05/06/19 180 lb 2 oz (81.7 kg)     Temp Readings from Last 3 Encounters:   03/07/21 98.4 °F (36.9 °C) (Oral)   05/18/19 98.1 °F (36.7 °C) (Oral)   06/07/18 98.4 °F (36.9 °C)     BP Readings from Last 3 Encounters:   03/07/21 (!) 166/86   12/15/20 (!) 182/71   05/18/19 (!) 128/59     Pulse Readings from Last 3 Encounters:   03/07/21 65   12/15/20 59   05/18/19 63       24 HR INTAKE/OUTPUT :     Intake/Output Summary (Last 24 hours) at 3/7/2021 0844  Last data filed at 3/7/2021 0136  Gross per 24 hour   Intake    Output 1850 ml   Net -1850 ml     DIET GENERAL;    OBJECTIVE  CURRENT VITALS BP (!) 166/86   Pulse 65   Temp 98.4 °F (36.9 °C) (Oral)   Resp 18   Ht 5' 11\" (1.803 m)   Wt 139 lb 3.2 oz (63.1 kg)   SpO2 94%   BMI 19.41 kg/m²   GENERAL: Presents semi-Fowlers in bed, awake and alert; In no acute distress and well nourished. SKIN: Appropriate for ethnicity, warm and dry. EYES: No apparent trauma, discharge, or hematoma bilaterally. PERRL   CARDIO: No visible chest wall deformity.  No heaves or lifts. Strong/regular S1/S2 rate and rhythm. No rubs murmurs, or gallops. 2+ radial and dorsalis pedal pulses. Capillary refill <2 sec. No extremity edema noted. PULMONARY:  Trachea midline, no audible wheezing, increased respiratory effort, accessory muscle use, or asymmetrical chest wall movement. Lung sounds are clear to ascultation in superior and inferior fields. No wheezing, crackles, or rhonchi. ABDOMEN: Abdomen is non distended. Bowel sounds present in all four quadrants. Soft and non tender to palpation in all quadrants. NEURO: Follows commands. PMS intact, moves limbs freely. No focal neurological deficits  MSK: No new tenderness to muscular or bony structure palpation.  strength 5/5 and equal bilaterally. WOUND: Laceration to posterior scalp with staples in place. No bleeding or drainage noted. LABS  CBC :   Recent Labs     03/05/21 2010 03/06/21  0430   WBC 10.8 10.0   HGB 13.1 10.7*   HCT 41.2 33.8*   MCV 98.1 97.4    133     BMP:   Recent Labs     03/05/21 2010 03/06/21  0430   * 137   K 4.3 4.3   CL 99 105   CO2 26 26   BUN 13 12   CREATININE 0.7 0.6     COAGS:   Recent Labs     03/05/21 2010   PROT 6.8     Pancreas/HFP:  No results for input(s): LIPASE, AMYLASE in the last 72 hours. Recent Labs     03/05/21 2010   AST 19   ALT 13   BILITOT 0.3   ALKPHOS 81     RADIOLOGY  No new images      Electronically signed by Gena Perez PA-C on 3/7/2021 at 8:44 AM     Patient seen and examined independently by me early this AM. Above discussed and I agree with KASIE Mercado. See my additional comments below for updated orders and plan. Labs, cultures, and radiographs where available were reviewed. I discussed patient concerns with the patient's nurse and instructions were given. Please see our orders for the updated patient care plan. -Nonoperative treatment plan by orthopedic spine. Following up as outpatient. Pain and nausea control. Neurovascular checks. PT/OT.   [place] care.  Antibiotics for UTI. DVT prophylaxis with SCDs. Pulmonary toileting. Social service with discharge planning in process.     Electronically signed by Virginia Kwok MD on 3/7/21 at 11:23 AM EST

## 2021-03-07 NOTE — PROGRESS NOTES
Department of Orthopedic Surgery  Spine Service  [facility] [place] Progress Note        Subjective:    3/7/21  Esperanza Mendoza is resting in bed. She complains of some soreness. Ok to be up with PT/OT. Vitals  VITALS:  BP (!) 166/86   Pulse 65   Temp 98.4 °F (36.9 °C) (Oral)   Resp 18   Ht 5' 11\" (1.803 m)   Wt 139 lb 3.2 oz (63.1 kg)   SpO2 94%   BMI 19.41 kg/m²   24HR INTAKE/OUTPUT:      Intake/Output Summary (Last 24 hours) at 3/7/2021 0949  Last data filed at 3/7/2021 0136  Gross per 24 hour   Intake    Output 1850 ml   Net -1850 ml     URINARY CATHETER OUTPUT (Shaikh):  Urethral Catheter 18 fr-Output (mL): 1850 mL  DRAIN/TUBE OUTPUT:         PHYSICAL EXAM:    Orientation:  alert and oriented to person, place and time      Lower Extremity Motor :  quadriceps, extensor hallucis longus, dorsiflexion, plantarflexion 5/5 bilaterally  Lower Extremity Sensory:  Intact L1-S1    Flatus:  positive    ABNORMAL EXAM FINDINGS:  none    LABS:    HgB:    Lab Results   Component Value Date    HGB 10.7 03/06/2021         ASSESSMENT AND PLAN:    S2 Fx    1: Ok to work with PT/OT  2: Discharge per primary  3: Orthospine will sign off for now. Please contact if any questions.

## 2021-03-07 NOTE — PROGRESS NOTES
PRN morphine 2mg IV administered for pain 5/10. Noted patient became itchy at site, above, and below. Patient gave herself a skin tear to left forearm. Site cleansed with normal saline pat dry, and 3x3 mepilex dressing applied. Servando barksdale served.  IV benadryl 25mg ordered
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
## 2021-03-08 PROBLEM — E43 SEVERE MALNUTRITION (HCC): Status: ACTIVE | Noted: 2021-01-01

## 2021-03-08 NOTE — PROGRESS NOTES
Patient has an unsteady gait. Patient has intact speech and has hearing loss. Patient has appropriate affect. Patient reports no pain. Patient is A&O x3. Patient's sensory is intact with no defecits. . Pupils are 4mm. PERRLA. Sclera is white. Conjunctiva is pink and moist. Hand grasp is equal and strong. Pedal push/pull is equal and weak. Heart sounds are clear with no signs of a murmur. No neck vein distension noted. Radial pulses are 2+ regular bilaterally. Pedal pulses are 2+ regular bilaterally. Tibial pulse is 2+ regular bilaterally. Capillary refill is less than 3 seconds. Oral mucosa is pink and moist. Teeth are in good repair. Lung sounds are clear anteriorly and posteriorly bilaterally. Breathing is unlabored with no cough. Abdomen is soft and non-tender. Bowel sounds are active. Patient has a bowel movement today on 3/8/2021 and is incontinent. Skin is warm, dry, and smooth. Skin has good turgor. No edema noted. Urine is clear and yellow in sanches catheter. UE ROM is limited and weak. LE ROM is limited and weak. Patient has an IV that is capped in the left arm. The IV site has a small amount of old drainage.

## 2021-03-08 NOTE — PROGRESS NOTES
cholecystectomy 2019      As noted, the patient is largely stable at this point and further evaluation regarding optimal place for therapy is ongoing. After discussion with Dr. Jazmine Fonseca, he concurs that the patient is best suitable for an observation status, although anticipated stay will be greater than 2 midnights in total.  He has authorized change in status and order has been written for this purpose. He hopes to transition the patient out of the hospital as soon as appropriate placement is secured.     Thank you, Dr. Jazmine Fonseca for your time & assistance in this case    Fay Neri MD

## 2021-03-08 NOTE — PROGRESS NOTES
Minimal Assistance, with increased time for completion, cues for hand placement. x 2 trials. FUNCTIONAL MOBILITY:  Assistive Device: Rolling Walker  Assist Level:  Minimal Assistance and with increased time for completion. Distance: EOB to Recliner. Slow pace, No LOB, Relies heavy on B UEs, cues for posture. ASSESSMENT:   Activity Tolerance:  Patient tolerance of  treatment: good. [facility] Patient would benefit from continued therapy after discharge   Equipment Recommendations: Equipment Needed: Yes  Other: May benefit from a bedside commode, reacher or sock aid  Plan: Times per week: 6x  Specific instructions for Next Treatment: Functional transfers as able; ADLs while in sitting at the edge of bed; upper body exercises and relaxed breathing  Current Treatment Recommendations: Functional Mobility Training, Endurance Training, Self-Care / ADL, Safety Education & Training, Balance Training, Pain Management  Plan Comment: Pt would benefit from continued skilled OT services when medically stable and discharged from Acute. OT recommended while at [facility]. Patient Education  Patient Education: Role of OT, Plan of Care, ADL's, Importance of Increasing Activity and Assistive Device Safety    Goals  Short term goals  Time Frame for Short term goals: By discharge  Short term goal 1: Pt will complete BUE AROM/moderate resistance exercises while in supine and head of bed elevated to increase her endurance and strength for ease of doing self care and functional mobility. (Vilma Neil)   Short term goal 2: Pt will complete transfers with OTR to prepare for doing self care including her toileting routine. (MET and REVISED)   Short term goal 3: Pt will complete simple ADLs while in sitting for over 8 minute duration with Setup A while using any adaptations neede to increase her independence with self care. (CONTINUE)     Revised Short-Term Goals Short term goals  Time Frame for Short term goals: By discharge  Short term goal 1: Pt will complete BUE AROM/moderate resistance exercises while in supine and head of bed elevated to increase her endurance and strength for ease of doing self care and functional mobility. Short term goal 2: Pt will complete sit tostand transfers with CGA and min vcs for safety to increase indep with toileting. Short term goal 3: Pt will complete simple ADLs while in sitting for over 8 minute duration with Setup A while using any adaptations neede to increase her independence with self care. Short term goal 4: Pt will complete short distance mobility with CGA and min vcs for safety to increase indep and safety with self cares. Following session, patient left in safe position with all fall risk precautions in place.

## 2021-03-08 NOTE — PROGRESS NOTES
unsure of last tetanus shot and chart reviewed with no tetanus immunization documented in last 10 years. Plan to update on admission. No midline cervical or thoracic midline tenderness to palpation. Patient noted to have midline tenderness over lower lumber spine into sacral area. Chest and abdomen nontender. No extremity tenderness to palpation. PMS intact in all four extremities. Patient moves all extremities spontaneously but decrease ROM in bilateral lower extremities secondary to low back pain. Distal sensation intact. Strength equal bilaterally. Chronic indwelling sanches catheter. CT images reviewed. CT head, cervical spine, thoracic spine, and lumbar spine all negative for any acute traumatic injuries. CT lumbar spine and pelvis did note possibility of a sacral fracture at S2. Given pain and tenderness to palpation over location consistent with acute fracture. Labs and vitals reviewed. Patient with history of hypertension, plan to resume home blood pressure medications on admission. Patient to be admitted to trauma surgery with consult placed to orthopedic spine for recommendations regarding S2 fracture and SLP cog eval following [facility]. Trauma surgeon, Dr. Claudio High, notified of patient by ED physician. Discussed code status with patient and daughter at bedside. Patient clearly stated she did not want any resuscitative measures.  Plan to admit with limited x4.\" ST consulted to complete cognitive evaluation d/t [facility] and determine goals and POC as well as assist with discharge recommendations   Past Medical History:   Diagnosis Date    Arthritis     Bilateral carotid artery disease ([address] 2012    Breast CA ([address]     Chronic diastolic CHF (congestive heart failure) ([address] 7/20/2017    Chronic kidney disease     Colon, diverticulosis 2013    GERD (gastroesophageal reflux disease)     Hiatal hernia with gastroesophageal reflux 2013    Hypertension 2009    Insomnia     LBBB (left bundle branch block) 2004 attention; impaired complex attention   Math Computation: 0/3  Executive Functionin/5    SWALLOWING:  Current Diet: Regular diet with thin liquids        RECOMMENDATIONS/ASSESSMENT:  DIAGNOSTIC IMPRESSIONS:  Patient presents with at least moderate cognitive deficits as evidence by the findings outlined above. Patient demonstrated with impaired memory, thought organization, problem solving, reasoning and complex attention. Expressive and receptive language essentially [facility] for basic communicative exchanges. No dysarthria, dysphonia or dysphagia. RN Rosemary reports, \"good success with PO medication thus far. \" Patient [place], pocket talker utilized and did assist with communicative exchanges. ST with HIGH safety concerns for patient to safely return to prior level setting; ST recommending 24/hour supervision + direct assistance with medications and finances + continuation of no driving. Patient would benefit from skilled ST services to target barriers/deficits and assist with safe discharge recommendations. Rehabilitation Potential: good    EDUCATION:  Learner: Patient  Education:  Reviewed results and recommendations of this evaluation, Reviewed ST goals and Plan of Care, Reviewed recommendations for follow-up and Education Related to Potential Risks and Complications Due to Impairment/Illness/Injury  Evaluation of Education: Verbalizes understanding, Demonstrates with assistance, Needs further instruction and Family not present    PLAN:  Skilled SLP intervention on acute care 3-5 x per week or until goals met and/or pt plateaus in function. Specific interventions for next session may include: cognitive tx. PATIENT GOAL:    Did not state. Will further assess during treatment.     SHORT TERM GOALS:  Short-term Goals  Timeframe for Short-term Goals: 2 weeks  Goal 1: Patient will complete recall, short term memory and working memory tasks with 70% accuracy provided mod cues to improve overall recall of daily and medical information  Goal 2: Patient will complete problem solving/reasoning tasks with 70% accurcy provided mod cues to improve safety within hospital and home setting  Goal 3: Patient will complete sequencing and divergent naming tasks with 70% accuracy provided mod cues to improve overall thought organization  Goal 4: Patient will complete sustained, divided and alterntating attention tasks with no more than x2 errors within 2 minutes in order to improve completion of multi-step tasks  Goal 5: Complete swallow evaulation as indiciated.     LONG TERM GOALS:  No LTGs due to short [address] [place] [address].[place] [address]

## 2021-03-08 NOTE — PROGRESS NOTES
[facility]  Daily Progress Note  Pt Name: Austin Stearns  Medical Record Number: [id number]  Date of Birth 10/3/1931   Today's Date: 3/8/2021    HD: # 2    CC:\"i'm alright\"    ASSESSMENT  1. Active Hospital Problems    Diagnosis Date Noted    Fall at home, initial encounter [W19. Jaciel Gan, F31.187] 03/06/2021    Closed head injury [S09.90XA] 03/06/2021    Scalp laceration, initial encounter [S01.01XA] 03/06/2021    Sacral fracture ([address] [S32.10XA] 03/06/2021         PLAN  Patient admitted under Trauma Services      Fall at home              - PT/OT              - Up with assistance              - Fall risk precautions     Closed head injury, parietal scalp laceration              - SLP cog eval              - Neuro checks              - Pain control              - Local wound care              - Remove staples in 1-2 weeks     Sacral fracture at S2 level              - Orthopedic spine consulted              - Okay to work with PT/OT              - Pain control              - Neuro checks   - Ortho spine signed off     History of Hypertension, CHF, CKD, and Neurogenic bladder              - Home blood pressure medications restarted               - Repeat labs in AM              - Chronic sanches in place    - Bactrim was prescribed on 3/2 to 3/12 for report UTI    - UA ordered, only small leukocytes, otherwise asymptomatic, will hold ATB for now due to no evidence of improved outcomes in clinical research.     - Home ferrous sulfate reordered, repeat CBC in AM              - Continue to monitor     Consults: Orthopedic spine     Pain Management              -Tylenol, added today Ultram to help pain control with therapy     Prophylaxis: SCD's, Incentive Spirometry, Colace, Pepcid, Zofran     General diet     IVF stopped  Regular Neurovascular Checks  Repeat Labs Tomorrow AM  PT/OT/SLP Eval and Treat     Planned Discharge to pending clinical course   - Social work consulted for discharge planning   - Increase activity with PT/OT         SUBJECTIVE  She is a 80 y.o. female who continues to present at [facility] [address] [facility] following a mechanical fall at home. Patient reports no pain at time of exam, but 10/10 pain in low back with movement. Patient denies chest pain, shortness of breath, headache, numbness, paraesthesias, weakness, nausea, vomiting, pelvic discomfort, or suprapubic pain. Plan continued work with therapy, SW consulted for placement. Hemoglobin stable, WBC stable, no electrolyte abnormality, CT studies reviewed, vital signs stable on exam. Care in coordination with trauma surgeon Dr. James Koroma. Wt Readings from Last 3 Encounters:   03/06/21 139 lb 3.2 oz (63.1 kg)   05/14/19 168 lb (76.2 kg)   05/06/19 180 lb 2 oz (81.7 kg)     Temp Readings from Last 3 Encounters:   03/08/21 98.3 °F (36.8 °C) (Oral)   05/18/19 98.1 °F (36.7 °C) (Oral)   06/07/18 98.4 °F (36.9 °C)     BP Readings from Last 3 Encounters:   03/08/21 125/60   12/15/20 (!) 182/71   05/18/19 (!) 128/59     Pulse Readings from Last 3 Encounters:   03/08/21 77   12/15/20 59   05/18/19 63       24 HR INTAKE/OUTPUT :     Intake/Output Summary (Last 24 hours) at 3/8/2021 0817  Last data filed at 3/8/2021 0506  Gross per 24 hour   Intake 1885.64 ml   Output 2525 ml   Net -639.36 ml     DIET GENERAL;    OBJECTIVE  CURRENT VITALS /60   Pulse 77   Temp 98.3 °F (36.8 °C) (Oral)   Resp 18   Ht 5' 11\" (1.803 m)   Wt 139 lb 3.2 oz (63.1 kg)   SpO2 93%   BMI 19.41 kg/m²   GENERAL: Presents semi-Fowlers in bed, awake and alert; In no acute distress and well nourished. Urinary cath in place, urine light qi and clear. SKIN: Appropriate for ethnicity, warm and dry. EYES: No apparent trauma, discharge, or hematoma bilaterally. PERRL 3mm  CARDIO: No visible chest wall deformity. No heaves or lifts. Strong/regular S1/S2 rate and rhythm. No rubs murmurs, or gallops.  2+ radial and dorsalis pedal pulses. Capillary refill <2 sec. No extremity edema noted. PULMONARY:  Trachea midline, no audible wheezing, increased respiratory effort, accessory muscle use, or asymmetrical chest wall movement. Lung sounds are clear to ascultation in superior and inferior fields. No wheezing, crackles, or rhonchi. ABDOMEN: Abdomen is non distended. Bowel sounds present in all four quadrants. Soft and non tender to palpation in all quadrants. NEURO: Follows commands. PMS intact, moves limbs freely. No focal neurological deficits  MSK: No new tenderness to muscular or bony structure palpation.  strength 5/5 and equal bilaterally. WOUND: Laceration to posterior scalp with staples in place. No bleeding or drainage noted. LABS  CBC :   Recent Labs     03/05/21 2010 03/06/21  0430 03/08/21  0640   WBC 10.8 10.0 7.2   HGB 13.1 10.7* 10.8*   HCT 41.2 33.8* 34.5*   MCV 98.1 97.4 98.3    133 129*     BMP:   Recent Labs     03/05/21 2010 03/06/21  0430   * 137   K 4.3 4.3   CL 99 105   CO2 26 26   BUN 13 12   CREATININE 0.7 0.6     COAGS:   Recent Labs     03/05/21 2010   PROT 6.8     Pancreas/HFP:  No results for input(s): LIPASE, AMYLASE in the last 72 hours. Recent Labs     03/05/21 2010   AST 19   ALT 13   BILITOT 0.3   ALKPHOS 81     RADIOLOGY  No new images      Electronically signed by KASIE Anderson on 3/8/2021 at 8:17 AM     Patient seen and examined independently by me early this AM. Above discussed and I agree with KASIE Anderson. See my additional comments below for updated orders and plan. Labs, cultures, and radiographs where available were reviewed. I discussed patient concerns with the patient's nurse and instructions were given. Please see our orders for the updated patient care plan. -Conservative management per orthopedic spine. Pain control. PT/OT. Neurovascular checks. Wound/laceration care. DVT prophylaxis. Stool softeners as needed. Pulmonary toileting. Regular diet. Speech therapy. Social service for discharge planning. Stable from a trauma standpoint for discharge when okay with consultants/therapy.     Electronically signed by Janie Pompa MD on 3/8/21 at 2:30 PM EST

## 2021-03-08 NOTE — PROGRESS NOTES
Physician Progress Note      PATIENT:               Jose Lambert  CSN #:                  [id number]  :                       10/3/1931  ADMIT DATE:       3/5/2021 6:44 PM  [address] [place] DATE:  RESPONDING  PROVIDER #:        Murray Cervantes MD          QUERY TEXT:    Pt admitted with traumatic S2 fracture s/p fall. Noted documentation of   severe malnutrition by dietician consultant. If possible, please document in   progress notes and discharge summary:    The medical record reflects the following:    Risk Factors: poor PO intake  Clinical Indicators: Severe malnutrition per dietician per AND/ASPEN   guidelines as evidenced by energy intake 50% or less x1 day, 18% weight loss   over   3 months per patient report, Moderate body fat loss Orbital, Moderate muscle   mass loss Temples (temporalis), Clavicles (pectoralis & deltoids)  Treatment: Dietician consult & Ensure Enlive TID    Thank you! Rhett Mullet, Juel Primrose, CRCR  RN Clinical   P: [phone]  Options provided:  -- Severe malnutrition confirmed present on admission  -- Severe malnutrition confirmed not present on admission  -- Severe malnutrition ruled out  -- Other - I will add my own diagnosis  -- Disagree - Not applicable / Not valid  -- Disagree - Clinically unable to determine / Unknown  -- Refer to Clinical Documentation Reviewer    PROVIDER RESPONSE TEXT:    The diagnosis of severe malnutrition was confirmed as present on admission.     Query created by: Jonathan Garcia on 3/8/2021 3:06 PM      Electronically signed by:  Murray Cervantes MD 3/8/2021 3:36 PM

## 2021-03-08 NOTE — PROGRESS NOTES
[facility]  Daily Progress Note  Pt Name: Claudia Green  Medical Record Number: [id number]  Date of Birth 10/3/1931   Today's Date: 3/8/2021    HD: # 2    CC:\"i'm alright\"    ASSESSMENT  1. Active Hospital Problems    Diagnosis Date Noted    Fall at home, initial encounter [W19. Carmela Hutchins, R49.813] 03/06/2021    Closed head injury [S09.90XA] 03/06/2021    Scalp laceration, initial encounter [S01.01XA] 03/06/2021    Sacral fracture ([address] [S32.10XA] 03/06/2021         PLAN  Patient admitted under Trauma Services      Fall at home              - PT/OT              - Up with assistance              - Fall risk precautions     Closed head injury, parietal scalp laceration              - SLP cog eval              - Neuro checks              - Pain control              - Local wound care              - Remove staples in 1-2 weeks     Sacral fracture at S2 level              - Orthopedic spine consulted              - Okay to work with PT/OT              - Pain control              - Neuro checks   - Ortho spine signed off     History of Hypertension, CHF, CKD, and Neurogenic bladder              - Home blood pressure medications restarted               - Repeat labs in AM              - Chronic sanches in place    - Bactrim was prescribed on 3/2 to 3/12 for report UTI    - UA ordered, only small leukocytes, otherwise asymptomatic, will hold ATB for now due to no evidence of improved outcomes in clinical research.     - Home ferrous sulfate reordered, repeat CBC in AM              - Continue to monitor     Consults: Orthopedic spine     Pain Management              -Tylenol, added today Ultram to help pain control with therapy     Prophylaxis: SCD's, Incentive Spirometry, Colace, Pepcid, Zofran     General diet     IVF stopped  Regular Neurovascular Checks  Repeat Labs Tomorrow AM  PT/OT/SLP Eval and Treat     Planned Discharge to pending clinical course   - Social work consulted for discharge planning   - Increase activity with PT/OT         SUBJECTIVE  She is a 80 y.o. female who continues to present at [facility] on [address] [facility] following a mechanical fall at home. Patient reports no pain at time of exam, but 10/10 pain in low back with movement. Patient denies chest pain, shortness of breath, headache, numbness, paraesthesias, weakness, nausea, vomiting, pelvic discomfort, or suprapubic pain. Plan continued work with therapy, SW consulted for placement. Hemoglobin stable, WBC stable, no electrolyte abnormality, CT studies reviewed, vital signs stable on exam. Care in coordination with trauma surgeon Dr. Talia Schuster. Wt Readings from Last 3 Encounters:   03/06/21 139 lb 3.2 oz (63.1 kg)   05/14/19 168 lb (76.2 kg)   05/06/19 180 lb 2 oz (81.7 kg)     Temp Readings from Last 3 Encounters:   03/08/21 98.3 °F (36.8 °C) (Oral)   05/18/19 98.1 °F (36.7 °C) (Oral)   06/07/18 98.4 °F (36.9 °C)     BP Readings from Last 3 Encounters:   03/08/21 125/60   12/15/20 (!) 182/71   05/18/19 (!) 128/59     Pulse Readings from Last 3 Encounters:   03/08/21 77   12/15/20 59   05/18/19 63       24 HR INTAKE/OUTPUT :     Intake/Output Summary (Last 24 hours) at 3/8/2021 0809  Last data filed at 3/8/2021 0506  Gross per 24 hour   Intake 1885.64 ml   Output 2525 ml   Net -639.36 ml     DIET GENERAL;    OBJECTIVE  CURRENT VITALS /60   Pulse 77   Temp 98.3 °F (36.8 °C) (Oral)   Resp 18   Ht 5' 11\" (1.803 m)   Wt 139 lb 3.2 oz (63.1 kg)   SpO2 93%   BMI 19.41 kg/m²   GENERAL: Presents semi-Fowlers in bed, awake and alert; In no acute distress and well nourished. Urinary cath in place, urine light qi and clear. SKIN: Appropriate for ethnicity, warm and dry. EYES: No apparent trauma, discharge, or hematoma bilaterally. PERRL 3mm  CARDIO: No visible chest wall deformity. No heaves or lifts. Strong/regular S1/S2 rate and rhythm. No rubs murmurs, or gallops.  2+ radial and dorsalis pedal pulses. Capillary refill <2 sec. No extremity edema noted. PULMONARY:  Trachea midline, no audible wheezing, increased respiratory effort, accessory muscle use, or asymmetrical chest wall movement. Lung sounds are clear to ascultation in superior and inferior fields. No wheezing, crackles, or rhonchi. ABDOMEN: Abdomen is non distended. Bowel sounds present in all four quadrants. Soft and non tender to palpation in all quadrants. NEURO: Follows commands. PMS intact, moves limbs freely. No focal neurological deficits  MSK: No new tenderness to muscular or bony structure palpation.  strength 5/5 and equal bilaterally. WOUND: Laceration to posterior scalp with staples in place. No bleeding or drainage noted. LABS  CBC :   Recent Labs     03/05/21 2010 03/06/21  0430 03/08/21  0640   WBC 10.8 10.0 7.2   HGB 13.1 10.7* 10.8*   HCT 41.2 33.8* 34.5*   MCV 98.1 97.4 98.3    133 129*     BMP:   Recent Labs     03/05/21 2010 03/06/21  0430   * 137   K 4.3 4.3   CL 99 105   CO2 26 26   BUN 13 12   CREATININE 0.7 0.6     COAGS:   Recent Labs     03/05/21  2010   PROT 6.8     Pancreas/HFP:  No results for input(s): LIPASE, AMYLASE in the last 72 hours.   Recent Labs     03/05/21 2010   AST 19   ALT 13   BILITOT 0.3   ALKPHOS 81     RADIOLOGY  No new images      Electronically signed by KASIE Herrera on 3/8/2021 at 8:09 AM

## 2021-03-08 NOTE — PLAN OF CARE
Problem: Nutrition  Goal: Optimal nutrition therapy  Outcome: Ongoing   Nutrition Problem #1: Severe malnutrition  Intervention: Food and/or Nutrient Delivery: Continue Current Diet, Start Oral Nutrition Supplement  Nutritional Goals: Patient will consume 75% or more of meals during LOS.

## 2021-03-08 NOTE — CARE COORDINATION
DISCHARGE/PLANNING EVALUATION  3/8/21, 10:38 AM EST    Reason for Referral: from assisted living   Mental Status:  Alert, oriented   Decision Making:  Son is POA and helps with decisions about care   Family/Social/Home Environment:  Victorino Welch is a resident of [facility]. She has been managing most of her own care, does her laundry, manages her own dressing, has assistance with shower, sanches care. Her meals are provided by the facility. Current Services including food security, transportation and housekeeping:  Meals from assisted living facility, facility and family provide transportation, facility assists with housekeeping as needed. Current Equipment: walker, wheelchair   Payment Source: Nickolas Chen, medicaid   Concerns or Barriers to Discharge:  May need rehab before return to assisted living, depending on mobility and need for assistance   Post acute provider list with quality measures, geographic area and applicable managed care information provided. Questions regarding selection process answered:     Teach Back Method used with son regarding care plan and discharge plan  Patient's son verbalizes understanding of the plan of care and contributes to goal setting. Patient goals, treatment preferences and discharge plan:  Spoke with patient son, who shares he prefers return to assisted living, if facility is able to manage her care. He feels that she is comfortable there and he would prefer she stay in a familiar environment. Spoke with Hart Fothergill at [facility]. They would like to review therapy notes, then decide if they can manage Kyleigh's care in assisted living, or what additional services may be needed.    Chart information faxed to [facility], with son's approval.       Electronically signed by JOEL Sanchez on 3/8/2021 at 10:38 AM

## 2021-03-08 NOTE — PROGRESS NOTES
Patient was awake, alert and oriented x4, and in a pleasant mood. Speech was clear and appropriate. Patient stated intermittent pain upon moving. RN notified. Patient's skin was pink, warm, and dry. Cap refill within 3 seconds bilaterally, turgor within three second bilaterally. No tongue deviation or mouth droop detected. Hand grasp equal bilaterally, 2+. Pedal Push and pull equal bilaterally, 2+. Sclera white, conjunctiva pink and moist and pupils went from 3-2. Oral mucosa are pink and moist, dentures in good repair. No JVD noted and no carotid bruit auscultated. Patient's breathing was unlabored. Patient's breath sounds were equal and clear bilaterally, anteriorly and posteriorly. Patient heart sounds were clear and strong, s1 and s2. Radial, pedal, and post. Tib. pulses are 2+ and regular bilaterally. Abdomen was soft, flat, and nontender with active bowel sounds in all four quadrants. No edema noted. Patient has a indwelling sanches catheter, urine is clear and straw yellow. Patient has a capped IV on her left forearm. Occlusive dressing is clean, dry, and intact. Patient has weak FROM bilaterally upper and lower.

## 2021-03-08 NOTE — PROGRESS NOTES
[facility]  INPATIENT PHYSICAL THERAPY  DAILY NOTE  [facility] ORTHOPEDICS 7K - 7K-05/005-A  Time In: 4566  Time Out: 1455  Timed Code Treatment Minutes: 26 Minutes  Minutes: 26          Date: 3/8/2021  Patient Name: Dede Medrano,  Gender:  female        MRN: [id number]  : 10/3/1931  (80 y.o.)     Referring Practitioner: Lorna Sierra PA-C  Diagnosis: Fall at home, initial encounter  Additional Pertinent Hx: Pt admitted after having had a mechanical fall while at home. She had suffered S2 fx and CHI with scalp laceration. She had repair if her scalp laceration while at ER. She was seen by orthopedics and it was determined that her S2 fracture was not operable. She was cleared to participate in therapy as much as she tolerates. Prior Level of Function:  Lives With: Alone  Type of Home: Assisted living  Home Layout: One level  Home Access: Level entry  Home Equipment: Wheelchair-manual, Rolling walker(occasional use of RW prior)   Bathroom Accessibility: Accessible    Receives Help From: Family  ADL Assistance: Independent  Homemaking Assistance: Needs assistance  Homemaking Responsibilities: Yes  Ambulation Assistance: Independent  Transfer Assistance: Independent  Active : No  Additional Comments: Pt was ambulating short distances with the rolling walker. She would use the W/C for some activities such as transporting her laundry to/from the laundry room. Pt has delivered meals 7x/wk. Restrictions/Precautions:  Restrictions/Precautions: Weight Bearing, General Precautions, Fall Risk  Right Lower Extremity Weight Bearing: Weight Bearing As Tolerated  Left Lower Extremity Weight Bearing: Weight Bearing As Tolerated  Position Activity Restriction  Other position/activity restrictions: Scalp wound repair     SUBJECTIVE: pt in bed upon arrival and agreeable to do therapy with some encouragement. PAIN: 0/10: while resting, 10/10 \"whenever she's moving\" in back.  Asked nursing about pain meds.     Vitals: Vitals not assessed per clinical judgement, see nursing flowsheet    OBJECTIVE:  Bed Mobility:  Rolling to Left: Minimal Assistance: used bed rail and therapist    Supine to Sit: Moderate Assistance: use of therapist's hand to sit up, HOB elevated at 35 degrees. Pt unable to tolerate HOB flat. Scooting: Stand By Assistance    Transfers:  Sit to Stand: Min/CGA: from EOB to walker  Stand to Sit: Contact Guard Assistance  With min cues for hand placement for safety. Ambulation:  Contact Guard Assistance  Distance: ~5 feet from bed to chair. Distance limited d/t pain. Surface: Level Tile  Device:Rolling Walker  Gait Deviations: Forward Flexed Posture, Slow Bonita, Decreased Step Length Bilaterally, Decreased Gait Speed, Decreased Heel Strike Bilaterally, Narrow Base of Support and Decreased Terminal Knee Extension Bilaterally    Balance:  Static Sitting Balance:  Stand By Assistance: in prep for gait    Exercise:  Patient was guided in 1 set(s) 10 reps of exercise to both lower extremities. Ankle pumps, Glut sets, Quad sets, Heelslides, Short arc quads, Hip abduction/adduction, Straight leg raises, Seated marches, Seated heel/toe raises and Long arc quads. Exercises were completed for increased independence with functional mobility. Functional Outcome Measures: Completed  -PAC Inpatient Mobility Raw Score : 17  -PAC Inpatient T-Scale Score : 42.13    ASSESSMENT:  Assessment: Patient progressing toward established goals. Pt would benefit from cont therapy for increased strength, endurance, and balance to improve independence for functional mobility. Activity Tolerance:  Patient tolerance of  treatment: fair. Pt had increased back pain 10/10 throughout session, especially while ambulating. Pt only ambulated 5 feet from bed to chair, and required min verbal cues for hand placement for safety.       Equipment Recommendations:Equipment Needed: No  Discharge Recommendations:  Continue to

## 2021-03-08 NOTE — CARE COORDINATION
3/8/21, 10:25 AM EST  DISCHARGE PLANNING EVALUATION:    Jamila Cisneros       Admitted: 3/5/2021/ [facility] day: 2   Location: -05/005-A Reason for admit: Fall at home, initial encounter [W19. Eliud Varner, D02.843]   PMH:  has a past medical history of Arthritis, Bilateral carotid artery disease ([facility] [address] Breast CA ([facility] [address] Chronic diastolic CHF (congestive heart failure) ([facility] [address] Chronic kidney disease, Colon, diverticulosis, GERD (gastroesophageal reflux disease), Hiatal hernia with gastroesophageal reflux, Hypertension, Insomnia, LBBB (left bundle branch block), Movement disorder, Neurogenic bladder, Osteoporosis, and Pneumonia. Procedure: 3/6/21 in ED: repair of scalp laceration  Barriers to Discharge:  Closed head injury. Neuro checks. Pain control. S2 fx - Ortho consult- no surgery. PT/OT WBAT. PCP: Aries Conley MD  Readmission Risk Score: 10%    Patient Goals/Plan/Treatment Preferences: [place] [address] [place] [address] [place] Return to [place] vs E[place]. SW on case. Transportation/Food Security/Housekeeping Addressed:  No issues identified.

## 2021-03-08 NOTE — CARE COORDINATION
Condition Code 44 conditions met, a Utilization Review Committee member reviewed this case and agrees that this patient does not meet evidenced based criteria for inpatient services, requiring a change in patient status from \"admit as inpatient\" to \"place in observation\", in accordance with condition code 44. Medical care will continue to be provided by (name of physician), MD , who agrees with the decision and has documented the agreement in the patient's medical record, as evidenced by the observation order/additional documentation. GARCIA was signed by the patient and placed in the chart. Copy of the MOON was given to the patient. Explanation to the patient that they are in Observation status. All questions addressed.       Electronically signed by Cari Coronado RN on 3/8/2021 at 4:18 PM

## 2021-03-09 NOTE — PROGRESS NOTES
Patient resting in bed, no concerns voiced, call light and tray table within reach, SBAR report given to nurse Riley.  -RSC Jessica

## 2021-03-09 NOTE — PROGRESS NOTES
Patient lying semi fowlers in bed, Alert and oriented x4. Patient rates pain \"1\"/10, comfortable when she is still and worsening with ambulation, pain relieved with repositioning. Patient hard of hearing, uses external hearing device with headphones and microphone. Speech clear and appropriate, Heart sounds regular, Lung sounds diminished bilaterally, anteriorly, and posteriorly. Bowel sounds active in all four quadrants, patient incontinent of stool, Abdomen soft, non tender without palpable masses. Upper and lower extremities warm dry and adequate for skin tone. No numbness or tingling, No restricted movement in upper and lower extremities. Call light and tray table within reach, no concerns voiced.  MICKEY Lopez

## 2021-03-09 NOTE — CARE COORDINATION
3/9/21, 2:29 PM EST    DISCHARGE ON GOING [address] day: 2  Location: [facility]05/005-A Reason for admit: Fall at home, initial encounter [W19. Caro Braswell, X53.991]  Fall, initial encounter [id number]. XXXA]   Procedure: 3/6/21 in ED: repair of scalp laceration  Barriers to Discharge:  Closed head injury. Neuro checks. Pain control. S2 fx - Ortho consult- no surgery. PT/OT WBAT. Pain management team consult  PCP: Marikay Hodgkins, MD  Readmission Risk Score: 10%  Patient Goals/Plan/Treatment Preferences: *From [address] [place] [address] [place]. Return to [place] vs [place]. SW on case.

## 2021-03-09 NOTE — PROGRESS NOTES
[facility]if[facility] Dr. Mertha Riffle  Daily Progress Note  Pt Name: Kamila Schuler  Medical Record Number: [id number]  Date of Birth 10/3/1931   Today's Date: 3/9/2021    HD: # 2    CC: \"My hip and back hurt\"    ASSESSMENT  1. Active Hospital Problems    Diagnosis Date Noted    Severe malnutrition ([facility] [address] [E43] 03/08/2021     Class: Acute    Fall [W19. XXXA] 03/08/2021    Fall at home, initial encounter [W19. Jin Zoila, I73.761] 03/06/2021    Closed head injury [S09.90XA] 03/06/2021    Scalp laceration, initial encounter [S01.01XA] 03/06/2021    Sacral fracture ([facility] [address] [S32.10XA] 03/06/2021         PLAN  Patient admitted to [address] [place] [address] under Trauma Services      Fall at Assisted Living              - PT/OT               - Up with assistance              - Fall risk precautions     Closed head injury, parietal scalp laceration              - SLP cog eval indicates need for 24 hour supervision, no driving, med assistance and assistance with financial decisions              - Neuro checks              - Pain control              - Local wound care              - Remove staples 03/19/21     Sacral fracture at S2 level              - Orthopedic spine managing conservatively & have signed off              - Okay to work with PT/OT, no restrictions              - Pain control              - Neuro checks     History of Hypertension, CHF, CKD, and Neurogenic bladder              - Home blood pressure medications restarted & Norvasc added 3/9 by PCP              - Chronic sanches in place    - Bactrim was prescribed on 3/2 to 3/12 for report UTI    - UA notes only small leukocytes, otherwise asymptomatic, continue to  hold ATB for now due to no evidence of improved outcomes in clinical research.     - Home ferrous sulfate reordered              - Continue to monitor     Pain Management              -Tylenol & Ultram PRN   - Was given dose of Norco on 3/8 with side effect of increased confusion   - Pain management consulted     Prophylaxis: SCD's, Incentive Spirometry, Colace, Pepcid, Zofran     General diet    Regular Neurovascular Checks  PT/OT/SLP continue to treat as warranted     Planned Discharge to pending clinical course   - From [facility], jessica Winston [facility] notes have been faxed to [facility] for review. They have yet to determine if they are able to provide the care that Esperanza Mendoza needs. If they are not able to provide this care, according to therapy notes, she will need ECF placement.           SUBJECTIVE  Esperanza Breach continues on [facility]. She is lying in bed and states that she continues to have pain in her hip and lower back however her pain complaint is difficulty hearing at the present time. She reportedly had received a dose of Norco yesterday, which caused her to become confused, of which has cleared for the most part, according to nursing staff. She is pleasant at the time of rounds, but fixated on the headset she has I place to help her hear staff talk to her. She has been up with therapy today, ambulated in the [facility]. Pain continues, but per nurse, is a \"little better\" in comparison to yesterday. Continue to plan for disposition, awaiting assisted living's response in regards to if they are able to provide the care that Esperanza Mendoza needs or if she will need to transition to the ECF. Care in coordination with trauma surgeon Dr. Arin Toth.     Wt Readings from Last 3 Encounters:   03/06/21 139 lb 3.2 oz (63.1 kg)   05/14/19 168 lb (76.2 kg)   05/06/19 180 lb 2 oz (81.7 kg)     Temp Readings from Last 3 Encounters:   03/09/21 97.4 °F (36.3 °C) (Oral)   05/18/19 98.1 °F (36.7 °C) (Oral)   06/07/18 98.4 °F (36.9 °C)     BP Readings from Last 3 Encounters:   03/09/21 (!) 197/86   12/15/20 (!) 182/71   05/18/19 (!) 128/59     Pulse Readings from Last 3 Encounters:   03/09/21 71   12/15/20 59   05/18/19 63       24 HR INTAKE/OUTPUT :     Intake/Output Summary (Last 24 hours) at 3/9/2021 [id number] Verena Crawford filed at 3/9/2021 0357  Gross per 24 hour   Intake 1271 ml   Output 2600 ml   Net -1329 ml     DIET GENERAL;  Dietary Nutrition Supplements: Standard High Calorie Oral Supplement    OBJECTIVE  CURRENT VITALS BP (!) 197/86   Pulse 71   Temp 97.4 °F (36.3 °C) (Oral)   Resp 18   Ht 5' 11\" (1.803 m)   Wt 139 lb 3.2 oz (63.1 kg)   SpO2 95%   BMI 19.41 kg/m²   GENERAL: In no acute distress and well nourished. Urinary cath in place, urine light qi and clear. SKIN: Appropriate for ethnicity, warm and dry. EYES: No apparent trauma, discharge, or hematoma bilaterally. PERRL 3mm  CARDIO: Strong/regular S1/S2 rate and rhythm. No rubs murmurs, or gallops. 2+ radial and dorsalis pedal pulses. Capillary refill <2 sec. No extremity edema noted. PULMONARY:  Lung sounds are clear throughout bilaterally. No wheezing, crackles, or rhonchi. ABDOMEN: Abdomen is non distended. Bowel sounds present in all four quadrants. Soft and non tender to palpation in all quadrants. NEURO: Follows commands. PMS intact, moves limbs freely. No focal neurological deficits  MSK: No new tenderness to muscular or bony structure palpation.  strength 5/5 and equal bilaterally. WOUND: Laceration to posterior scalp with staples in place. No bleeding or drainage noted. LABS  CBC :   Recent Labs     03/08/21  0640 03/09/21 0435   WBC 7.2 7.3   HGB 10.8* 11.0*   HCT 34.5* 34.2*   MCV 98.3 96.6   * 155     BMP:   Recent Labs     03/09/21  0435      K 3.8      CO2 25   BUN 8   CREATININE 0.4     COAGS:   Recent Labs     03/09/21 0435   PROT 5.8*     Pancreas/HFP:  No results for input(s): LIPASE, AMYLASE in the last 72 hours.   Recent Labs     03/09/21 0435   AST 17   ALT 15   BILITOT 0.6   ALKPHOS 89     RADIOLOGY  No new images      Electronically signed by DIGNA Sorto CNP on 3/9/2021 at 8:41 AM     Patient seen and examined independently by me early this AM. Above discussed and I agree with Melina Acosta CNP. See my additional comments below for updated orders and plan. Labs, cultures, and radiographs where available were reviewed. I discussed patient concerns with the patient's nurse and instructions were given. Please see our orders for the updated patient care plan. -Intermittent confusion. Trying to limit narcotic usage. PT/OT. Pain management. Social service for discharge planning. Stable from a trauma standpoint.     Electronically signed by Joann Puri MD on 3/9/21 at 12:35 PM EST

## 2021-03-09 NOTE — FLOWSHEET NOTE
03/09/21 0755   Provider Notification   Reason for Communication Review case;Evaluate  (Consult)   Provider Name [facility]   Provider Notification Advance Practice Clinician (CNS, NP, CNM, CRNA, PA)   Method of Communication Secure Message   Response Waiting for response   Notification Time 1402

## 2021-03-09 NOTE — PLAN OF CARE
Problem: Pain:  Goal: Pain level will decrease  Description: Pain level will decrease  3/9/2021 0234 by Sanket Soler RN  Outcome: Ongoing  Note: Pain Assessment: 0-10  Pain Level: 5   Patient's Stated Pain Goal: No pain   Is pain goal met at this time? Patient denies pain at times and states it worsens with movement. Patient denies need for pain medication at this time. However, provider notified of increased confusion post norco administration. Provider changed medication and placed consult for pain management. Non-Pharmaceutical Pain Intervention(s): Rest, Repositioned      Problem: Pain:  Goal: Control of acute pain  Description: Control of acute pain  3/9/2021 0234 by Sanket Soler RN  Outcome: Ongoing  Note: Pain Assessment: 0-10  Pain Level: 5   Patient's Stated Pain Goal: No pain   Is pain goal met at this time? Patient denies pain at times and states it worsens with movement. Patient denies need for pain medication at this time. However, provider notified of increased confusion post norco administration. Provider changed medication and placed consult for pain management. Non-Pharmaceutical Pain Intervention(s): Rest, Repositioned      Problem: Falls - Risk of:  Goal: Will remain free from falls  Description: Will remain free from falls  3/9/2021 0234 by Sanket Soler RN  Outcome: Ongoing  Note: Patient remained free of falls this shift. Fall precautions in place. Items within reach, call light within reach and side rails up x2. Bed alarm is on. Hourly rounding in place. Patient verbalizes understanding of using call light to ask for assistance as needed. Will monitor. Problem: Falls - Risk of:  Goal: Absence of physical injury  Description: Absence of physical injury  3/9/2021 0234 by Sanket Soler RN  Outcome: Ongoing   No injury has occurred this shift. Fall and safety precautions in place. Assisting patient to turn and reposition frequently to prevent skin breakdown. Will monitor. Problem: Skin Integrity:  Goal: Will show no infection signs and symptoms  Description: Will show no infection signs and symptoms  3/9/2021 0234 by Mariah Collado RN  Outcome: Ongoing  Note: Patient is afebrile. No s/s of infection noted. Staples to posterior head laceration. Problem: SAFETY  Goal: Free from accidental physical injury  3/9/2021 0234 by Mariah Collado RN  Outcome: Ongoing  Note: No injury has occurred this shift. Fall and safety precautions in place. Assisting patient to turn and reposition frequently to prevent skin breakdown. New breakdown noted on first assessment. EPC cream in use. Turning every 2 hours. Will monitor. Problem: DISCHARGE BARRIERS  Goal: Patient's continuum of care needs are met  3/9/2021 0234 by Mariah Collado RN  Outcome: Ongoing  Note: Discharge planning in progress. Return to AL vs ECF pending therapy.  and  involved in discharge planning. Will follow. Problem: Nutrition  Goal: Optimal nutrition therapy  3/9/2021 0234 by Mariah Collado RN  Outcome: Ongoing  Note: Encouraged oral intake throughout shift. Tolerating fairly well. Problem: Skin Integrity:  Goal: Absence of new skin breakdown  Description: Absence of new skin breakdown  3/9/2021 0234 by Mariah Collado RN  Outcome: Not Met This Shift  Note: Patient has posterior head laceration that was repaired with staples. Full skin assessment performed. New moisture associated breakdown on coccyx noted during first assessment. Coccyx very slow to mitali. EPC cream in use. Heels elevated off of bed. Skin tear noted to left forearm. Will monitor. Problem: Pain:  Goal: Control of chronic pain  Description: Control of chronic pain  3/9/2021 0234 by Mariah Collado RN  Outcome: Completed  Note: Denies any chronic pain. Care plan reviewed with patient . Patient  verbalize understanding of the plan of care and contribute to goal setting.    However, short term memory loss and patient's increase in confusion may not allow to her to fully understand plan of care and goal setting.

## 2021-03-09 NOTE — CARE COORDINATION
3/9/21, 2:53 PM EST    DISCHARGE PLANNING EVALUATION      Spoke with Caroline Cota. They are suggesting ecf stay for rehab before return to assisted living. Call made to son, unable to reach and no voice mail available. If son is in agreement, will refer to ecf, will need precert for ecf admission.

## 2021-03-09 NOTE — CONSULTS
Pain Consult Note      Admitting Physician: Olimpia Em MD    Primary Care Provider: Cali Lea MD     Reason for Consult:  Pain management consult requested by [facility] KEYANNA for pain control and difficulty tolerating medications. History of Present Illness:  Iris Ohara is a 80 y.o. female [facility] [address] [place] [address] [facility]. This patient with a history significant hearing loss, CHF, CAD, arthritis, falls, HTN, osteoporosis with previous compression fractures, and CKD who presented to [address] [place] [address] as a level 3 trauma after a fall from home. She hit her head but apparently did not lose any consciousness. Workup was completed in the ER and she had a CT of her pelvis which showed a possible sacral fracture of S2, her CT of her lumbar spine showed a chronic L1 compression fracture and her CT of her thoracic spine showed mil.ld osteoporotic fractures at T3, T4, and T5. During my evaluation today this patient has a main complaint of apin in her lower back and sacral area with + tenderness. She reports that she does not have any pain while at rest and still but pain increases to 10/10 with any movement. Currently for pain she has Ultram prn which was increased to 50 mg every 6 hours prn from 25 mg. She has tried Morphine and [place] since admission but did not tolerate it. The patient was seen by the pain team today and at the time of our evaluation the patient complains of pain in her lower back and sacral area/ buttocks which she reports that is 10/10 with any movement at all or turning and is described as a sharp pain. She denies any pain at rest. Does reports that the Ultram helps decrease pain. She reports that she does not want to move at al d/t pain.  In the past 24 hours she has used 1 dose of tylenol and 100 mg of Ultram.     Past Medical History:        Diagnosis Date    Arthritis     Bilateral carotid artery disease ([facility] 2012    [facility]     Chronic diastolic CHF (congestive heart failure) ([facility] 7/20/2017    Chronic kidney disease     Colon, diverticulosis 2013    GERD (gastroesophageal reflux disease)     Hiatal hernia with gastroesophageal reflux 2013    Hypertension 2009    Insomnia     LBBB (left bundle branch block) 2004    Movement disorder     Neurogenic bladder     Osteoporosis 2009    Pneumonia        Past Surgical History:        Procedure Laterality Date    BACK SURGERY  1989    3 pinched nerves    BREAST SURGERY      CATARACT REMOVAL  2011    left, right    CHOLECYSTECTOMY, LAPAROSCOPIC N/A 5/14/2019    ROBOTIC CHOLECYSTECTOMY performed by Tamar Nguyen MD at [facility]  11/13    ENDOSCOPY, COLON, DIAGNOSTIC      EYE SURGERY      bilateral cataracts    HUMERUS FRACTURE SURGERY Right 7/20/2017    ORIF RIGHT HUMERUS WITH NAIL performed by Lorrie Koo MD at [address]      both knees and both hips    LAMINECTOMY  7/7/2012   [address]; [facility]  right 1987; left 1991    AL OFFICE/OUTPT VISIT,PROCEDURE ONLY Left 1/20/2018    COLONOSCOPY performed by Sarita Avendano MD at [address] Left 7/9/2012    TONSILLECTOMY      TOTAL HIP ARTHROPLASTY  right 1994; left [id number]  27  left and right 1998    UPPER GASTROINTESTINAL ENDOSCOPY  2013       Allergies:     Allergies   Allergen Reactions    Morphine Itching        Current Medications:   Current Facility-Administered Medications: traMADol (ULTRAM) tablet 50 mg, 50 mg, Oral, Q6H PRN  amLODIPine (NORVASC) tablet 5 mg, 5 mg, Oral, Daily  lidocaine 4 % external patch 3 patch, 3 patch, Transdermal, Daily  acetaminophen (TYLENOL) tablet 500 mg, 500 mg, Oral, 3 times per day  acetaminophen (TYLENOL) tablet 500 mg, 500 mg, Oral, Q6H PRN  diphenhydrAMINE (BENADRYL) injection 25 mg, 25 mg, Intravenous, Q6H PRN  ferrous sulfate (IRON 325) tablet 325 mg, 325 mg, Oral, BID  sodium chloride flush 0.9 % injection 10 mL, 10 mL, Intravenous, 2 times per day  sodium chloride flush 0.9 % injection 10 mL, 10 mL, Intravenous, PRN  promethazine (PHENERGAN) tablet 12.5 mg, 12.5 mg, Oral, Q6H PRN **OR** ondansetron (ZOFRAN) injection 4 mg, 4 mg, Intravenous, Q6H PRN  polyethylene glycol (GLYCOLAX) packet 17 g, 17 g, Oral, Daily PRN  docusate sodium (COLACE) capsule 100 mg, 100 mg, Oral, Daily  metoprolol tartrate (LOPRESSOR) tablet 25 mg, 25 mg, Oral, BID  hydrALAZINE (APRESOLINE) tablet 75 mg, 75 mg, Oral, Q8H    Social History:  Social History     Socioeconomic History    Marital status:       Spouse name: Not on file    Number of children: 2    Years of education: Not on file    Highest education level: Not on file   Occupational History    Not on file   Social Needs    Financial resource strain: Not on file    Food insecurity     Worry: Not on file     Inability: Not on file    Transportation needs     Medical: Not on file     Non-medical: Not on file   Tobacco Use    Smoking status: Former Smoker     Packs/day: 2.00     Years: 40.00     Pack years: 80.00     Types: Cigarettes     Quit date: 1983     Years since quittin.2    Smokeless tobacco: Never Used   Substance and Sexual Activity    Alcohol use: No    Drug use: No    Sexual activity: Never   Lifestyle    Physical activity     Days per week: Not on file     Minutes per session: Not on file    Stress: Not on file   Relationships    Social connections     Talks on phone: Not on file     Gets together: Not on file     Attends [employer] service: Not on file     Active member of club or organization: Not on file     Attends meetings of clubs or organizations: Not on file     Relationship status: Not on file    Intimate partner violence     Fear of current or ex partner: Not on file     Emotionally abused: Not on file     Physically abused: Not on file     Forced sexual activity: Not on file   Other Topics Concern    Not on file   Social History Narrative    Not on file     Family History:       Problem Relation Age of Onset    Heart Disease Mother     Cancer Father     Heart Disease Sister     Cancer Brother    Murvin Crumb Son         lung (smoker)    Cancer Daughter         colon    Colon Cancer Daughter        Review of Systems:  CONSTITUTIONAL:  negative  EYES:  negative  HEENT:  [place] of hearing  RESPIRATORY:  negative  CARDIOVASCULAR:  negative  GASTROINTESTINAL: + BM 3/9/2021  GENITOURINARY:  Shaikh, neurogenic bladder   SKIN:  Scalp laceration  HEMATOLOGIC/LYMPHATIC:  negative  MUSCULOSKELETAL:  positive for  myalgias, arthralgias, bone pain and low back pain   NEUROLOGICAL:  positive for weakness  BEHAVIOR/PSYCH:  negative  System review otherwise negative      Physical Exam:  BP (!) 160/71   Pulse 58   Temp 98.3 °F (36.8 °C) (Oral)   Resp 16   Ht 5' 11\" (1.803 m)   Wt 139 lb 3.2 oz (63.1 kg)   SpO2 95%   BMI 19.41 kg/m²      awake  Orientation:   person, place, time  Mood: within normal limits  Affect: calm and quiet  General appearance: Patient is well nourished, well developed, well groomed and in no acute distress, appearing stated age    Memory:   normal,   Attention/Concentration: normal  Language:  normal    Cranial Nerves:  cranial nerves II-XII are grossly intact  ROM:  Decraesed ROM d/t low back and sacral pain, normal ROM in all 4 extremities   Motor Exam:  Motor exam is symmetrical 5 out of 5 all extremities bilaterally  + lumbar facet loading and tenderness, + sacral tenderness   Tone:  normal  Muscle bulk: within normal limits  Sensory:  Sensory intact    Heart: normal rate, regular rhythm, normal S1, S2, no murmurs, rubs, clicks or gallops  Lungs: clear to auscultation without wheezes or rales  Abdomen: soft, non-tender, non-distended, normal bowel sounds, no masses or organomegaly    Skin: Scalp laceration   Peripheral vascular: Pulses: Normal upper and lower extremity pulses; Edema: no      Diagnostics:  Recent Results (from the past 24 hour(s))   CBC Auto Differential    Collection Time: 03/09/21  4:35 AM   Result Value Ref Range    WBC 7.3 4.8 - 10.8 thou/mm3    RBC 3.54 (L) 4.20 - 5.40 mill/mm3    Hemoglobin 11.0 (L) 12.0 - 16.0 gm/dl    Hematocrit 34.2 (L) 37.0 - 47.0 %    MCV 96.6 81.0 - 99.0 fL    MCH 31.1 26.0 - 33.0 pg    MCHC 32.2 32.2 - 35.5 gm/dl    RDW-CV 15.4 (H) 11.5 - 14.5 %    RDW-SD 54.4 (H) 35.0 - 45.0 fL    Platelets 523 978 - 259 thou/mm3    MPV 10.5 9.4 - 12.4 fL    Seg Neutrophils 65.8 %    Lymphocytes 13.2 %    Monocytes 12.4 %    Eosinophils 7.2 %    Basophils 0.8 %    Immature Granulocytes 0.6 %    Segs Absolute 4.8 1 - 7 thou/mm3    Lymphocytes Absolute 1.0 1.0 - 4.8 thou/mm3    Monocytes Absolute 0.9 0.4 - 1.3 thou/mm3    Eosinophils Absolute 0.5 (H) 0.0 - 0.4 thou/mm3    Basophils Absolute 0.1 0.0 - 0.1 thou/mm3    Immature Grans (Abs) 0.04 0.00 - 0.07 thou/mm3    nRBC 0 /100 wbc   Comprehensive Metabolic Panel    Collection Time: 03/09/21  4:35 AM   Result Value Ref Range    Glucose 91 70 - 108 mg/dL    CREATININE 0.4 0.4 - 1.2 mg/dL    BUN 8 7 - 22 mg/dL    Sodium 136 135 - 145 meq/L    Potassium 3.8 3.5 - 5.2 meq/L    Chloride 102 98 - 111 meq/L    CO2 25 23 - 33 meq/L    Calcium 8.5 8.5 - 10.5 mg/dL    AST 17 5 - 40 U/L    Alkaline Phosphatase 89 38 - 126 U/L    Total Protein 5.8 (L) 6.1 - 8.0 g/dL    Albumin 3.2 (L) 3.5 - 5.1 g/dL    Total Bilirubin 0.6 0.3 - 1.2 mg/dL    ALT 15 11 - 66 U/L   Ammonia    Collection Time: 03/09/21  4:35 AM   Result Value Ref Range    Ammonia 13 11 - 60 umol/L   Anion Gap    Collection Time: 03/09/21  4:35 AM   Result Value Ref Range    Anion Gap 9.0 8.0 - 16.0 meq/L   Glomerular Filtration Rate, Estimated    Collection Time: 03/09/21  4:35 AM   Result Value Ref Range    Est, Glom Filt Rate >90 ml/min/1.73m2       CT pelvis:    Findings:   Chronic appearing fracture of the right inferior pubic ramus.  Mild   cortical step-off involving the anterior cortex of the S2 vertebral   segment.  No dislocation. Status post bilateral hip replacement. Osteopenia. Postsurgical changes of   the lumbar spine. Infiltration of subcutaneous fat within the inferomedial aspect of the   left buttock, likely secondary to scarring. No fluid collection. No   radiopaque foreign body. No acute abnormality of pelvic contents.  Colonic diverticulosis.     Moderate fecal retention within the colon.  Shaikh catheter within the   urinary bladder. Infiltration of presacral soft tissues.       Impression:       Possible sacral fracture at the S2 level. CT of lumbar spine:   Findings:   Trace anterolisthesis of L3 on L4. Partial visualization of thoracolumbar spine fusion rods extending to the   L2 level. Status post laminectomy extending from L3-L5. Moderate chronic   appearing compression fracture at L1. Multilevel degenerative disc disease and facet osteoarthritis with central   canal and neural foraminal stenosis at multiple levels. Osteopenia. No acute abnormality of visualized abdominal contents.       Impression:       1.  No acute lumbar spine fracture. 2.  Apparent cortical step-off involving the anterior cortex of S2 raising   the possibility of a sacral fracture. 3.  Chronic compression fracture at L1.  Postsurgical changes of the   thoracolumbar spine. CT of thoracic spine:   Findings:   Vertebral alignment is within normal limits. No significant degenerative change. Partial visualization of the thoracolumbar spine fusion rods extending   inferiorly from the T10 level. No gross evidence of hardware failure. Hemangioma at the T7 level. Visualized lungs and mediastinum are unremarkable. Mild cardiomegaly. Mitral annular calcifications. Coronary artery   calcifications.       Impression:       1. No acute thoracic spine fracture. 2. Mild osteoporotic type fractures of the T3, T4 and T5 vertebral bodies. Moderate chronic appearing compression fracture at L1.      CT of head:     Findings:   Involutional change and nonspecific white matter hypodensity. Coarse subcentimeter calcification associated with the tentorium on the   right, possibly representing a small meningioma.  No suspicious mass. No midline shift, hydrocephalus, or acute hemorrhage. Orbits, and mastoid air cells are unremarkable.  Near complete   opacification of the left sphenoid sinus   No skull fracture       Impression:       Impression:   1. No acute findings        Impression:  1. Acute pain from fall and injury   2. Intractable low back and sacral pain with possible sacral fracture  3. Osteoporosis with current pathologic fracture   4. History of chronic thoracic and lumbar fractures     Met with today's pain team as above. Discussed patient symptoms, reviewed medications and possible drug interactions, medication side effect profiles, previous medications and their efficacies, medical concerns regarding each pain management option (ie blood pressure, GI concerns, etc). Also reviewed labs (including creatinine clearance and LFT's regarding medication metabolism). Also reviewed all work-up studies including radiologic and other consultants. OARRS report was obtained and reviewed. Recommendations:  1. Ordered a lumbosacral MRI d/t severe pain with movement and to to check acuity of fractures. Will continue plan treat conservatively with therapy but discussed possible Sacroplasty if pain remains severe. This patient is observation status at this time so this could be completed outpatient. 2. Changed tylenol to 500 mg scheduled every 8 hours with an additional 500 mg every 6 hours as needed for mild to moderate pain of 1-6/10. At this time continue Ultram 50 mg every 6 hours for severe pain of 7-10/10. 3. If pain remains severe discussed changing to a low dose oxy IR  4.  Started Lidoderm pacthes, 3 patches daily to painful areas on low back and sacral areas   5. Bowel program senna and colace   6. Will continue to follow     I spent over 60 minutes evaluating this patient and completing documentation. It was my pleasure to evaluate Tram Velásquez today. Please call with questions.     Selina Stanton, APRN - CNP

## 2021-03-09 NOTE — CARE COORDINATION
3/9/21, 2:39 PM EST    DISCHARGE PLANNING EVALUATION      Spoke with [address] [place] [address] nurse. Facility staff is reviewing PT/OT notes to determine if Erendira Mcclain is appropriate for return to assisted living or if ecf is needed. Spoke with [facility] admissions. Bed may be available if needed, precert would be needed for admission, if not able to return to [facility].

## 2021-03-09 NOTE — PROGRESS NOTES
Patient in bed, sitting up for breakfast. Alert and oriented to person, not place, time or situation. Hard of hearing uses an external hearing aid system with headphones and microphone attached. Patient reports pain \"8\"/10, worse when ambulating, not relieved by repositioning. Pupils equal, round, and reactive to light. Heart sounds regular, Lung sounds diminished bilaterally, anteriorly and posteriorly. Symmetrical chest expansion, Bowel sounds active in all four quadrants, Upper and lower extremities tan, warm and normal for skin tone. No restricted movement in upper or lower extremities. Capillary refill and skin turgor less than 3 seconds. Hand grasp and pedal push and pull equal bilaterally. Patient voices no concerns, call light and tray table within reach.  -MORRIS Lopez

## 2021-03-09 NOTE — TELEPHONE ENCOUNTER
Please set patient up for ear cleaning with myself or Scottie Boast and Audio to follow. Please let me know when this is scheduled.

## 2021-03-09 NOTE — PROGRESS NOTES
Patient sitting up in bed playing cards with son. No concerns voiced, call light, and tray table within reach.  MICKEY Lopez

## 2021-03-09 NOTE — PROGRESS NOTES
[facility]  Occupational Therapy  Daily Note  Time:   Time In: 9250  Time Out: 6510  Timed Code Treatment Minutes: 25 Minutes  Minutes: 25          Date: 3/9/2021  Patient Name: Kelli Landin,   Gender: female      Room: [facility]  MRN: [id number]  : 10/3/1931  (80 y.o.)  Referring Practitioner: Pradip Sanabria PA-C  Diagnosis: Fall at Home  Additional Pertinent Hx: Pt admitted after having had a mechanical fall while at home. She had suffered S2 fx and CHI with scalp laceration. She had repair if her scalp laceration while at ER. She was seen by orthopedics and it was determined that her S2 fracture was not operable. She was cleared to participate in therapy as much as she tolerates. Restrictions/Precautions:  Restrictions/Precautions: Weight Bearing, General Precautions, Fall Risk  Right Lower Extremity Weight Bearing: Weight Bearing As Tolerated  Left Lower Extremity Weight Bearing: Weight Bearing As Tolerated  Position Activity Restriction  Other position/activity restrictions: Scalp wound repair     SUBJECTIVE: Pt seated in bedside chair calling out for assist upon arrival, agreeable to OT session and requesting back to bed. PAIN: Did not rate: Back    Vitals: Vitals not assessed per clinical judgement, see nursing flowsheet    COGNITION: Slow Processing, Decreased Recall, Decreased Insight, Impaired Memory, Decreased Problem Solving, Decreased Safety Awareness and Impaired Attention    ADL:   No ADL's completed this session. Pt declined. BALANCE:  Sitting Balance:  Stand By Assistance. EOB  Standing Balance: Contact Guard Assistance. x1 minute in prep for mobility. BED MOBILITY:  Sit to Supine: Maximum Assistance lifting BLE onto bed. Scooting: Stand By Assistance EOB    TRANSFERS:  Sit to Stand:  Minimal Assistance. Stand to Sit: Contact Guard Assistance.       FUNCTIONAL MOBILITY:  Assistive Device: Rolling Walker  Assist Level:  Contact Guard Assistance. Distance: Completed functional mobility short distance within pt room from chair to EOB at slow pace, no LOB noted. Pt declines further mobility and requires seated rest break after trial of mobility, mod fatigue noted. ADDITIONAL ACTIVITIES:  Patient identified a personal goal to increase UB strength and improve overall endurance so they can complete their toilet & shower transfers; skilled edu on UE strengthening. Completed BUE AROM exercises x10 reps x1 set seated EOB x1 variation and returned to supine for remainder d/t increased pain. in all joints/planes to increase strength and endurance required for ADLs. Pt required min rest break between each exercise and min v/c for proper technique. ASSESSMENT:     Activity Tolerance:  Patient tolerance of  treatment: fair. Discharge Recommendations: [address], Patient would benefit from continued therapy after discharge   Equipment Recommendations: Equipment Needed: Yes  Other: May benefit from a bedside commode, reacher or sock aid  Plan: Times per week: 6x  Specific instructions for Next Treatment: Functional transfers as able; ADLs while in sitting at the edge of bed; upper body exercises and relaxed breathing  Current Treatment Recommendations: Functional Mobility Training, Endurance Training, Self-Care / ADL, Safety Education & Training, Balance Training, Pain Management  Plan Comment: Pt would benefit from continued skilled OT services when medically stable and discharged from Acute. OT recommended while at [facility]. Patient Education  Patient Education: Home Exercise Program, Importance of Increasing Activity, Assistive Device Safety and Safety with transfers and mobility.     Goals  Short term goals  Time Frame for Short term goals: By discharge  Short term goal 1: Pt will complete BUE AROM/moderate resistance exercises while in supine and head of bed elevated to increase her endurance and strength for ease of doing self care and functional mobility. Short term goal 2: Pt will complete sit tostand transfers with CGA and min vcs for safety to increase indep with toileting. Short term goal 3: Pt will complete simple ADLs while in sitting for over 8 minute duration with Setup A while using any adaptations neede to increase her independence with self care. Short term goal 4: Pt will complete short distance mobility with CGA and min vcs for safety to increase indep and safety with self cares. Following session, patient left in safe position with all fall risk precautions in place.

## 2021-03-10 NOTE — PROGRESS NOTES
[facility]  INPATIENT PHYSICAL THERAPY  DAILY NOTE  [facility] 7K - 7K-05/005-A      Time In: 0800  Time Out: 08  Timed Code Treatment Minutes: 29 Minutes  Minutes: 29          Date: 3/10/2021  Patient Name: Marleni Kim,  Gender:  female        MRN: [id number]  : 10/3/1931  (80 y.o.)     Referring Practitioner: Geraldine Thurman PA-C  Diagnosis: Fall at home, initial encounter  Additional Pertinent Hx: Pt admitted after having had a mechanical fall while at home. She had suffered S2 fx and CHI with scalp laceration. She had repair if her scalp laceration while at ER. She was seen by orthopedics and it was determined that her S2 fracture was not operable. She was cleared to participate in therapy as much as she tolerates. Prior Level of Function:  Lives With: Alone  Type of Home: Assisted living  Home Layout: One level  Home Access: Level entry  Home Equipment: Wheelchair-manual, Rolling walker(occasional use of RW prior)   Bathroom Accessibility: Accessible    Receives Help From: Family  ADL Assistance: Independent  Homemaking Assistance: Needs assistance  Homemaking Responsibilities: Yes  Ambulation Assistance: Independent  Transfer Assistance: Independent  Active : No  Additional Comments: Pt was ambulating short distances with the rolling walker. She would use the W/C for some activities such as transporting her laundry to/from the laundry room. Pt has delivered meals 7x/wk.     Restrictions/Precautions:  Restrictions/Precautions: Weight Bearing, General Precautions, Fall Risk  Right Lower Extremity Weight Bearing: Weight Bearing As Tolerated  Left Lower Extremity Weight Bearing: Weight Bearing As Tolerated  Position Activity Restriction  Other position/activity restrictions: Scalp wound repair-- pt [facility] uses the pocket talker       SUBJECTIVE: nursing ok'd therapy, pt in bed and required encouragement to participate she appeared to be in more pain as she was guarded and moaning with mobility nursing reported that she did refuse her pain meds last night     PAIN: no pain at rest however pain increased 10/10: with mobility, transfers and attempts of gait =- pt did ask for pain meds at end of session did notify nursing     Vitals: Vitals not assessed per clinical judgement, see nursing flowsheet    OBJECTIVE:  Bed Mobility:  Supine to Sit: Moderate Assistance, pt needed cues for technique, she didn't tolerate the HOB being flat it was elevated ~ 30 degrees,and took extra time to complete   Sit to Supine: Moderate Assistance, at henna LEs and once in bed she needed mod assist to get repositioned    Scooting: Moderate Assistance, with extra time to complete     Transfers:  Sit to Stand: [employer], cues for hand placement   Stand to Sit:Moderate Assistance, cues to reach back she was very guarded when gong to sit     Ambulation:  Moderate Assistance  Distance: 2 side steps to [facility]   Surface: Level Tile  Device:Rolling Walker  Gait Deviations:  Pt struggled picking up foot to advance it so she more so just scooted her feet - due to pain she wasn't able to ambulate     Balance:  sitting edge of bed in prep for transfer with SBA pt took extra time while sitting due to pain     Exercise:  Patient was guided in 1 set(s) 10 reps of exercise to both lower extremities. Ankle pumps, Glut sets, Quad sets, Heelslides and Short arc quads. Exercises were completed for increased independence with functional mobility. Functional Outcome Measures: Completed  AM-PAC Inpatient Mobility Raw Score : 10  AM-PAC Inpatient T-Scale Score : 32.29    ASSESSMENT:  Assessment: pt was experiencing a lot of pain this date, she required increased assist with mobility and transfers and wasn't able to walk due to pain pt would benefit from cont skilled therapy   Activity Tolerance:  Patient tolerance of  treatment: fair.  Limited mobility due to pain      Equipment Recommendations:Equipment Needed: No  Discharge Recommendations:    Subacute/Skilled Nursing Facility(pt unsafe to return to apt w/o 24 hour assist)    Plan: Times per week: 5x O  Times per day: Daily  Current Treatment Recommendations: Strengthening, Home Exercise Program, Safety Education & Training, Balance Training, Endurance Training, Patient/Caregiver Education & Training, Functional Mobility Training, Gait Training, Transfer Training, Stair training    Patient Education  Patient Education: Plan of Care    Goals:  Patient goals : to move my body  Short term goals  Time Frame for Short term goals: by discharge  Short term goal 1: bed mobility with HOB flat and no use of rails, mod ind to increase functional ind  Short term goal 2: sit <> stand from various surfaces with use of 2WW and supervision assist in order to increase functional ind  Short term goal 3: amb 48' with use of 2WW and supervison assist in order to safely navigate home  Long term goals  Time Frame for Long term goals : NA due to short ELOS    Following session, patient left in safe position with all fall risk precautions in place.

## 2021-03-10 NOTE — PROGRESS NOTES
[facility]  Daily Progress Note  Pt Name: Yuriy Randhawa  Medical Record Number: [id number]  Date of Birth 10/3/1931   Today's Date: 3/10/2021    HD: # 2    CC: \"My pain is the same as yesterday\"    ASSESSMENT  1. Active Hospital Problems    Diagnosis Date Noted    Acute pain due to injury [G89.11]     Severe malnutrition ([facility] [address] [E43] 03/08/2021     Class: Acute    Fall [W19. XXXA] 03/08/2021    Fall at home [T57. [place] Eduardo, O06.387] 03/06/2021    Closed head injury [S09.90XA] 03/06/2021    Scalp laceration, initial encounter [S01.01XA] 03/06/2021    Sacral fracture ([facility] [address] [S32.10XA] 03/06/2021         PLAN  Patient admitted to [address] [place] [address] under Trauma Services      Fall at Assisted Living              - PT/OT               - Up with assistance              - Fall risk precautions     Closed head injury, parietal scalp laceration              - SLP cog eval indicates need for 24 hour supervision, no driving, med assistance and assistance with financial decisions              - Neuro checks              - Pain control              - Local wound care              - Remove staples 03/19/21     Sacral fracture at S2 level              - Orthopedic spine managing conservatively & have signed off              - Okay to work with PT/OT, no restrictions              - Pain control              - Neuro checks   -Pain management consulted, ordered MRI lumbar spine for consideration of kyphoplasty     History of Hypertension, CHF, CKD, and Neurogenic bladder              - Home blood pressure medications restarted & Norvasc added 3/9 by PCP              - Chronic sanches in place    - Bactrim was prescribed on 3/2 to 3/12 for report UTI    - UA notes only small leukocytes, otherwise asymptomatic, continue to hold ATB for now due to no evidence of improved outcomes in clinical research.      -Repeat UA secondary to confusion overnight, will consider starting Rocephin    - Home ferrous sulfate reordered              - Continue to monitor     Pain Management              -Tylenol & Ultram PRN   - Was given dose of Norco on 3/8 with side effect of increased confusion   - Pain management consulted     Prophylaxis: SCD's, Incentive Spirometry, Colace, Pepcid, Zofran     General diet    Regular Neurovascular Checks  PT/OT/SLP continue to treat as warranted     Planned Discharge to pending clinical course   - From [facility], an [address] notes have been faxed to [facility] for review. They have yet to determine if they are able to provide the care that [facility] needs. If they are not able to provide this care, according to therapy notes, she will need ECF placement.   -Social work indicates started pre-CERT for ECF, likely approved tomorrow.         SUBJECTIVE  She is a 80 y.o. female who continues to present at [address] [place] [address] on [facility] following a fall at home. Patient reports he continues to have 10/10 pain in lower back with movement, 0/10 pain at rest.  During exam patient did report one single episode of sharp suprapubic pain with prompt resolution. Patient denies chest pain, shortness of breath, cough, headache, dizziness, lightheadedness, numbness, paraesthesias, weakness, chills, fevers, nausea, vomiting, neck pain, dysuria. Nursing staff states patient confused over past 2 nights, spoke with son stating this is not unusual behavior. Plan to do work with therapy, social work starting pre-CERT for ECF placement, possible discharge tomorrow if okay with consulting services. Hypertensive on exam, continue to monitor. Care in coordination with trauma surgeon Dr. Leopold Andrea.       Wt Readings from Last 3 Encounters:   03/06/21 139 lb 3.2 oz (63.1 kg)   05/14/19 168 lb (76.2 kg)   05/06/19 180 lb 2 oz (81.7 kg)     Temp Readings from Last 3 Encounters:   03/10/21 97.5 °F (36.4 °C) (Oral)   05/18/19 98.1 °F (36.7 °C) (Oral)   06/07/18 98.4 °F (36.9 °C)     BP Readings from Last 3 Encounters:   03/10/21 (!) 173/72   12/15/20 (!) 182/71   05/18/19 (!) 128/59     Pulse Readings from Last 3 Encounters:   03/10/21 59   12/15/20 59   05/18/19 63       24 HR INTAKE/OUTPUT :     Intake/Output Summary (Last 24 hours) at 3/10/2021 1113  Last data filed at 3/9/2021 2058  Gross per 24 hour   Intake 720 ml   Output 1350 ml   Net -630 ml     DIET GENERAL;  Dietary Nutrition Supplements: Standard High Calorie Oral Supplement    OBJECTIVE  CURRENT VITALS BP (!) 173/72   Pulse 59   Temp 97.5 °F (36.4 °C) (Oral)   Resp 16   Ht 5' 11\" (1.803 m)   Wt 139 lb 3.2 oz (63.1 kg)   SpO2 95%   BMI 19.41 kg/m²   GENERAL: In no acute distress and well nourished. Urinary cath in place, urine light qi and clear, noted point foreign bodies in Shaikh tube. Lysbeth Carrel SKIN: Appropriate for ethnicity, warm and dry. EYES: No apparent trauma, discharge, or hematoma bilaterally. PERRL 3mm  CARDIO: Strong/regular S1/S2 rate and rhythm. No rubs murmurs, or gallops. 2+ radial and dorsalis pedal pulses. Capillary refill <2 sec. No extremity edema noted. PULMONARY:  Lung sounds are clear throughout bilaterally. No wheezing, crackles, or rhonchi. ABDOMEN: Abdomen is non distended. Bowel sounds present in all four quadrants. Soft and non tender to palpation in all quadrants. NEURO: Follows commands. PMS intact, moves limbs freely. No focal neurological deficits  MSK: No new tenderness to muscular or bony structure palpation.  strength 5/5 and equal bilaterally. WOUND: Laceration to posterior scalp with staples in place. No bleeding or drainage noted.     LABS  CBC :   Recent Labs     03/08/21  0640 03/09/21 0435   WBC 7.2 7.3   HGB 10.8* 11.0*   HCT 34.5* 34.2*   MCV 98.3 96.6   * 155     BMP:   Recent Labs     03/09/21 0435      K 3.8      CO2 25   BUN 8   CREATININE 0.4     COAGS:   Recent Labs     03/09/21 0435   PROT 5.8*     Pancreas/HFP:  No results for input(s): LIPASE, AMYLASE in the last 72 hours. Recent Labs     03/09/21  0435   AST 17   ALT 15   BILITOT 0.6   ALKPHOS 89     RADIOLOGY  No new images      Electronically signed by KASIE Alonzo on 3/10/2021 at 11:13 AM     Patient seen and examined independently by me early this AM. Above discussed and I agree with Rey Traylor CNP. See my additional comments below for updated orders and plan. Labs, cultures, and radiographs where available were reviewed. I discussed patient concerns with the patient's nurse and instructions were given. Please see our orders for the updated patient care plan. -MRI reviewed yesterday. Plan per orthopedic spine. PT/OT. No restrictions per orthopedic spine. Pain management team following. Possible kyphoplasty pending recommendations from others. DVT prophylaxis. Precertification in process. Social service following. Pain control continues to be somewhat of an issue.     Electronically signed by Maryjo Cosme MD on 3/10/21 at 12:22 PM EST

## 2021-03-10 NOTE — TELEPHONE ENCOUNTER
Spoke with Arsen Villanueva RN-7k 5. Informed of procedure date and time. Covid test ordered. Franklyn ROMERO Notified.

## 2021-03-10 NOTE — PLAN OF CARE
Problem: Pain:  Goal: Pain level will decrease  Description: Pain level will decrease  Outcome: Ongoing  Note: Pt report pain at 9 on scale. Pt states oral/iv/im medication helping to achieve pain goal of a 3 on scale. Goal: Control of acute pain  Description: Control of acute pain  Outcome: Ongoing     Problem: Falls - Risk of:  Goal: Will remain free from falls  Description: Will remain free from falls  Outcome: Ongoing  Note: Pt NOT using call light appropriately to call for assistance with ambulation to the bathroom and to chair. Pt is compliant with use of non-skid slippers. Pt unable to be redirected. Patient incredibly impulsive. Tele sitter in room. Goal: Absence of physical injury  Description: Absence of physical injury  Outcome: Ongoing     Problem: Skin Integrity:  Goal: Will show no infection signs and symptoms  Description: Will show no infection signs and symptoms  Outcome: Ongoing  Note: Pt has redness to her bottom and skin tear to her left forearm from her itching. Goal: Absence of new skin breakdown  Description: Absence of new skin breakdown  Outcome: Ongoing     Problem: SAFETY  Goal: Free from accidental physical injury  Outcome: Ongoing     Problem: DISCHARGE BARRIERS  Goal: Patient's continuum of care needs are met  Outcome: Ongoing     Problem: Nutrition  Goal: Optimal nutrition therapy  Outcome: Ongoing   Care plan reviewed with patient. Patient verbalize understanding of the plan of care and contribute to goal setting.

## 2021-03-10 NOTE — PLAN OF CARE
Problem: Pain:  Goal: Pain level will decrease  Description: Pain level will decrease  3/10/2021 0516 by Angeli Choudhary RN  Outcome: Ongoing  Note: Pt report pain at 8 on scale. Pt states oral/iv/im medication helping to achieve pain goal of a 5 on scale. 3/10/2021 0512 by Angeli Choudhary RN  Outcome: Ongoing  Note: Pt report pain at 9 on scale. Pt states oral/iv/im medication helping to achieve pain goal of a 3 on scale. Goal: Control of acute pain  Description: Control of acute pain  3/10/2021 0516 by Angeli Choudhary RN  Outcome: Ongoing  3/10/2021 0512 by Angeli Choudhary RN  Outcome: Ongoing     Problem: Falls - Risk of:  Goal: Will remain free from falls  Description: Will remain free from falls  3/10/2021 0516 by Angeli Choudhary RN  Outcome: Ongoing  Note: Pt using call light appropriately to call for assistance with ambulation to the bathroom and to chair. Pt is also compliant with use of non-skid slippers. Pt reports understanding of fall prevention when discussed. 3/10/2021 0512 by Angeli Choudhary RN  Outcome: Ongoing  Note: Pt NOT using call light appropriately to call for assistance with ambulation to the bathroom and to chair. Pt is compliant with use of non-skid slippers. Pt unable to be redirected. Patient incredibly impulsive. Tele sitter in room. Goal: Absence of physical injury  Description: Absence of physical injury  3/10/2021 0516 by Angeli Choudhary RN  Outcome: Ongoing  3/10/2021 0512 by Angeli Choudhary RN  Outcome: Ongoing     Problem: Skin Integrity:  Goal: Will show no infection signs and symptoms  Description: Will show no infection signs and symptoms  3/10/2021 0516 by Angeli Choudhary RN  Outcome: Ongoing  Note: Pt's abdominal surgical incision healing. Dressing is dry and intact and not due to be changed today. No other skin impairments noted. Pt understands the importance of frequent repositioning in order to prevent any skin breakdown.   3/10/2021 0512 by Yoli Infante RN  Outcome: Ongoing  Note: Pt has redness to her bottom and skin tear to her left forearm from her itching. Goal: Absence of new skin breakdown  Description: Absence of new skin breakdown  3/10/2021 0516 by Yoli Infante RN  Outcome: Ongoing  3/10/2021 0512 by Yoli Infante RN  Outcome: Ongoing     Problem: SAFETY  Goal: Free from accidental physical injury  3/10/2021 0516 by Yoli Infante RN  Outcome: Ongoing  3/10/2021 0512 by Yoli Infante RN  Outcome: Ongoing     Problem: DISCHARGE BARRIERS  Goal: Patient's continuum of care needs are met  3/10/2021 0516 by Yoli Infante RN  Outcome: Ongoing  3/10/2021 0512 by Yoli Infante RN  Outcome: Ongoing     Problem: Nutrition  Goal: Optimal nutrition therapy  3/10/2021 0516 by Yoli Infante RN  Outcome: Ongoing  3/10/2021 0512 by Yoli Infante RN  Outcome: Ongoing   Care plan reviewed with patient. Patient  verbalize understanding of the plan of care and contribute to goal setting.

## 2021-03-10 NOTE — PROGRESS NOTES
[address] [address]  INPATIENT SPEECH THERAPY  [facility] 7K  DAILY NOTE    TIME   SLP Individual Minutes  Time In: 1500  Time Out: 0886  Minutes: 12  Timed Code Treatment Minutes: 12 Minutes       Date: 3/10/2021  Patient Name: Connie Bartholomew      CSN: [id number]   : 10/3/1931  (80 y.o.)  Gender: female   Referring Physician:  Jeppie Litten, PA-C  Diagnosis:  Fall at home, initial encounter   Secondary Diagnosis: Cognitive deficits   Precautions: Fall Risk, [place]   Current Diet: Regular diet with thin liquids   Swallowing Strategies: Standard Universal Swallow Precautions  Date of Last MBS/FEES: Not Applicable    Pain:  No pain reported. Subjective:  Patient seen at bedside; highly lethargic this date. Limited session d/t patient with increasing fatigue. RN reports, \"patient did not sleep well last night. \" Patient's step-son present; Francine Lancaster.      Short-Term Goals:  SHORT TERM GOAL #1:  Goal 1: Patient will complete recall, short term memory and working memory tasks with 70% accuracy provided mod cues to improve overall recall of daily and medical information  INTERVENTIONS:   Orientation:   Month-indep  Date-off by one  Year-indep  [facility]    SHORT TERM GOAL #2:  Goal 2: Patient will complete problem solving/reasoning tasks with 70% accurcy provided mod cues to improve safety within hospital and home setting  INTERVENTIONS: DNT secondary to focus on other goals     SHORT TERM GOAL #3:  Goal 3: Patient will complete sequencing and divergent naming tasks with 70% accuracy provided mod cues to improve overall thought organization  INTERVENTIONS: Divergent naming: Target 5 members  Trial 1 Vegetables: 4/5 indep, 1/5 mod cues  Trial 2 Fruits: 4/5 indep, 1/5 mod cues  Trial 3 Ice cream flavors: 4/5 indep, 1/5 mod cues      SHORT TERM GOAL #4:  Goal 4: Patient will complete sustained, divided and alterntating attention tasks with no more than x2 errors within 2 minutes in order to improve completion of multi-step tasks  INTERVENTIONS: Poor sustained attention within short treatment session; multiple redirections     SHORT TERM GOAL #5:  Goal 5: Complete swallow evaulation as indiciated. INTERVENTIONS: RN reports, \"good success with meatloaf and mashed potatoes this date, no swallowing concerns. \"     Long-Term Goals: No LTGs due to short ELOS         EDUCATION:  Learner: Patient  Education:  Reviewed results and recommendations of this evaluation, Reviewed ST goals and Plan of Care and Reviewed recommendations for follow-up  Evaluation of Education: Norberto Ortiz understanding, Demonstrates with assistance and Needs further instruction    ASSESSMENT/PLAN:  Activity Tolerance:  Patient tolerance of  treatment: fair. Fatigued this date      Assessment/Plan: Patient progressing toward established goals. Continues to require skilled care of licensed speech pathologist to progress toward achievement of established goals and plan of care. .     Plan for Next Session: Cognitive tx     ABDULLAHI Bond 23

## 2021-03-10 NOTE — PROGRESS NOTES
Pain Managment   Progress Note      3/10/2021 5:03 PM     · SUBJECTIVE:    · Chief complaint: Sacral pain   · Patient continues to have severe sacral pain with any movement. Does not want to move at all. Paqin tolerable at rest.   · Today she is fatigued and sluggish with Ultram  · Discussed Lumbosacral MRI results and plan for bilateral sacroplasty tomorrow   · She has used 3 doses of prn Ultram in the past 24 hours.    · Medications effective:  Not when moving   · Pain rating is 0-110 at rest, 10/10 with any movement in sacral area   · Constipation: + Bm 3/9/2021    Current medications:    cloNIDine  0.1 mg Oral Q8H    donepezil  5 mg Oral Dinner    QUEtiapine  25 mg Oral BID    nitrofurantoin (macrocrystal-monohydrate)  100 mg Oral 2 times per day    amLODIPine  5 mg Oral Daily    lidocaine  3 patch Transdermal Daily    acetaminophen  500 mg Oral 3 times per day    senna  2 tablet Oral Nightly    docusate sodium  100 mg Oral BID    ferrous sulfate  325 mg Oral BID    sodium chloride flush  10 mL Intravenous 2 times per day    metoprolol tartrate  25 mg Oral BID    hydrALAZINE  75 mg Oral Q8H   ·   ·                                    traMADol, acetaminophen, diphenhydrAMINE, sodium chloride flush, promethazine **OR** ondansetron, polyethylene glycol  ·                                    @MEDSINFUSIONS        REVIEW OF SYSTEMS:  CONSTITUTIONAL: fatigued  EYES:  negative  HEENT:  [place] of hearing  RESPIRATORY:  negative  CARDIOVASCULAR:  negative  GASTROINTESTINAL: + BM 3/9/2021  GENITOURINARY:  Shaikh, neurogenic bladder, UTI    SKIN:  Scalp laceration  HEMATOLOGIC/LYMPHATIC:  negative  MUSCULOSKELETAL:  positive for  myalgias, arthralgias, bone pain and low back pain   NEUROLOGICAL:  positive for weakness  BEHAVIOR/PSYCH:  negative  System review otherwise negative       PHYSICAL EXAM:  BP (!) 108/59   Pulse 51   Temp 97.4 °F (36.3 °C) (Axillary)   Resp 16   Ht 5' 11\" (1.803 m)   Wt 139 lb 3.2 oz (63.1 kg)   SpO2 98%   BMI 19.41 kg/m²  I Body mass index is 19.41 kg/m². I   Wt Readings from Last 1 Encounters:   03/06/21 139 lb 3.2 oz (63.1 kg)      Fatigued  Orientation:   person, place  Mood: within normal limits  Affect: calm and quiet  General appearance: Patient is well nourished, well developed, well groomed and in no acute distress, appearing stated age     Memory:  decraesed thought processing   Attention/Concentration: normal  Language:  normal     Cranial Nerves:  cranial nerves II-XII are grossly intact  ROM:  Decraesed ROM d/t low back and sacral pain, normal ROM in all 4 extremities   Motor Exam:  Motor exam is symmetrical 5 out of 5 all extremities bilaterally  + lumbar facet loading and tenderness, + sacral tenderness   Tone:  normal  Muscle bulk: within normal limits  Sensory:  Sensory intact     Heart: chrissy rate, regular rhythm, normal S1, S2, no murmurs, rubs, clicks or gallops  Lungs: clear to auscultation without wheezes or rales  Abdomen: soft, non-tender, non-distended, normal bowel sounds, no masses or organomegaly     Skin: Scalp laceration   Peripheral vascular: Pulses: Normal upper and lower extremity pulses; Edema: no    DATA    Recent Labs     03/08/21  0640 03/09/21  0435   WBC 7.2 7.3   RBC 3.51* 3.54*   HGB 10.8* 11.0*   HCT 34.5* 34.2*   MCV 98.3 96.6   MCH 30.8 31.1   MCHC 31.3* 32.2   * 155   MPV 10.9 10.5     Recent Labs     03/09/21  0435      K 3.8      CO2 25   BUN 8   CREATININE 0.4   GLUCOSE 91   CALCIUM 8.5     No results for input(s): POCGLU in the last 72 hours. Lumbar MRI:  FINDINGS:   The patient appears to be status post instrumentation between approximately T10 and L2. There is an old compression fracture involving the L1 vertebral body with 40% compression.  There is mild retropulsion into the spinal canal. There are postoperative   changes with laminectomy defects at L3-4 and L4-5     The lumbar vertebral bodies are normally aligned.  .  There is abnormal signal intensity within the S2, S3 and S4 sacral segments, suspicious for acute fractures.  There are no acute compression fractures in the lumbar spine.  No pars defects are noted.             There is a slightly increased signal intensity in the distal thoracic spinal cord and tip of the conus. There are no gross abnormalities in the visualized aspects of the distal thoracic spine. On the axial images, at T12-L1, there is mild-to-moderate canal and bilateral foraminal stenosis     At L1-L2, there is mild canal and mild to moderate bilateral foraminal stenosis, right greater than left. At L2-L3, there is a 1.4 mm bulging disc and facet hypertrophy. This results in mild to moderate canal and moderate bilateral foraminal stenosis     At L3-L4, there are postoperative changes. There is a 1.8 mm bulging disc and facet hypertrophy. There is moderate bilateral foraminal stenosis with no canal stenosis. At L4-L5, there are postoperative changes. There is a 2.7 mm  bulging disc and facet hypertrophy. This results in mild to moderate canal and moderate to severe bilateral foraminal stenosis. At L5-S1, there is a 2.2 mm bulging disc and facet hypertrophy. This results in moderate canal and moderate to severe bilateral foraminal stenosis. There is degenerative change involving the sacroiliac joints bilaterally. There are multiple left renal cysts present. .         Impression:           1. Old compression fracture involving the L1 vertebral body and status post instrumentation between approximately T10 and L2.   2. Postoperative changes at L3-4 and L4-5.   3. Abnormal signal intensity in S2, S3 and S4 sacral segments consistent with bone marrow edema suspicious for recent fractures.    4. Degenerative changes resulting in moderate canal and moderate to severe bilateral foraminal stenosis and L5-S1.   5. There is mild to moderate canal  and moderate to severe bilateral foraminal stenosis and L4-5.   6. There is mild to moderate   canal and moderate bilateral foraminal stenosis at L2-3.   7. There is mild canal and mild to moderate bilateral foraminal stenosis and L1-2.   8. There is moderate bilateral foramen stenosis with no canal stenosis at L3-4.   9. There is degenerative change involving the sacroiliac joints bilaterally. 10. There are multiple left renal cysts present. ASSESSMENT   1. Acute pain from fall and injury   2. Sacral fracture with intractable pain   3. Osteoporosis with current pathologic fracture   4. History of chronic thoracic and lumbar fractures   5. UTI   6. Debility       PLAN  1. Continue pain management   2. Continue tylenol 500 mg scheduled every 8 hours with an additional 500 mg every 6 hours as needed for mild to moderate pain of 1-310.   3. At this time continue Ultram but changed to 25 mg to 50 mg every 6 hours for severe pain of moderate to sever pain of 4-10/10   4. Continue Lidoderm patches   5. Continue bowel program   6. Plan Bilateral Sacroplasty tomorrow scheduled at 3:30 pm with Dr. Nayana Gasca in the main OR. 7. Will need medical clearance by Dr. Jarvis Neves for Sacroplasty- nursing communication ordered. Patient was placed on Macrobid for UTI and Dr. Nayana Gasca is okay with this for Sacroplasty  8. NPO at midnight and rapid COVID test ordered   9.  Will continue to follow     Spent 28 minutes evaluating and examining patient and completing documentation      DIGNA Strange CNP, 3/10/2021, 5:03 PM

## 2021-03-10 NOTE — PROGRESS NOTES
Progress Note  Date:3/9/2021       Room:[address] [place], [place] [address]  Patient Brandon Menchaca     YOB: 1931     Age:89 y.o. Subjective    Subjective:  Symptoms:  Stable. She reports weakness. No shortness of breath, cough or chest pain. Diet:  Adequate intake. Activity level: Impaired due to weakness. Pain:  She complains of pain that is moderate. (Low back and sacral pain). Review of Systems   Constitutional: Negative for activity change and appetite change. HENT: Negative for congestion. Respiratory: Negative for apnea, cough, choking, chest tightness and shortness of breath. Cardiovascular: Negative for chest pain, palpitations and leg swelling. Gastrointestinal: Negative for abdominal distention and abdominal pain. Genitourinary: Negative for difficulty urinating and dysuria. Musculoskeletal: Positive for back pain. Low  Back and sacral pain   Neurological: Positive for weakness. Negative for dizziness, light-headedness and numbness. Psychiatric/Behavioral: Positive for agitation and confusion. Objective         Vitals Last 24 Hours:  TEMPERATURE:  Temp  Av °F (36.7 °C)  Min: 97.4 °F (36.3 °C)  Max: 98.3 °F (36.8 °C)  RESPIRATIONS RANGE: Resp  Av.6  Min: 16  Max: 18  PULSE OXIMETRY RANGE: SpO2  Av.4 %  Min: 94 %  Max: 97 %  PULSE RANGE: Pulse  Av.3  Min: 58  Max: 77  BLOOD PRESSURE RANGE: Systolic (76WWL), TFU:794 , Min:146 , WAO:823   ; Diastolic (04KBO), OWO:66, Min:65, Max:92    I/O (24Hr): Intake/Output Summary (Last 24 hours) at 3/9/2021 2351  Last data filed at 3/9/2021 2058  Gross per 24 hour   Intake 950 ml   Output 2200 ml   Net -1250 ml     Objective:  General Appearance:  Comfortable. Vital signs: (most recent): Blood pressure (!) 186/82, pulse 69, temperature 98 °F (36.7 °C), temperature source Oral, resp. rate 18, height 5' 11\" (1.803 m), weight 139 lb 3.2 oz (63.1 kg), SpO2 95 %, not currently breastfeeding.   Vital signs are normal.  (Systolic  bp  up). Output: Producing urine and producing stool. HEENT: Normal HEENT exam.    Lungs:  Normal effort and normal respiratory rate. Breath sounds clear to auscultation. Heart: Normal rate. Regular rhythm. S1 normal and S2 normal.  No murmur. Abdomen: Abdomen is soft. Bowel sounds are normal.   There is no abdominal tenderness. Extremities: Normal range of motion. (Low  Back pain  noted )  Neurological: Patient is alert. (Pleasantly confused). Skin:  Warm and dry. Labs/Imaging/Diagnostics    Labs:  CBC:  Recent Labs     03/08/21  0640 03/09/21 0435   WBC 7.2 7.3   RBC 3.51* 3.54*   HGB 10.8* 11.0*   HCT 34.5* 34.2*   MCV 98.3 96.6   * 155     CHEMISTRIES:  Recent Labs     03/09/21 0435      K 3.8      CO2 25   BUN 8   CREATININE 0.4   GLUCOSE 91     PT/INR:No results for input(s): PROTIME, INR in the last 72 hours. APTT:No results for input(s): APTT in the last 72 hours. LIVER PROFILE:  Recent Labs     03/09/21 0435   AST 17   ALT 15   BILITOT 0.6   ALKPHOS 89       Imaging Last 24 Hours:  No results found. Assessment//Plan           Hospital Problems           Last Modified POA    Fall at home 3/9/2021 Yes    Closed head injury 3/6/2021 Yes    Scalp laceration, initial encounter 3/6/2021 Yes    Sacral fracture ([address] 3/6/2021 Yes    Severe malnutrition ([address] 3/8/2021 Yes    Fall 3/8/2021 Yes    Acute pain due to injury 3/9/2021 Yes        Assessment:    Condition: In stable condition. Unchanged. (  Acute  Low  Back due to fall with sacral fracture      htn as some up due to  pain and due to anxiety     closed  Head  Injury    Acute pain due to  Injury    Scalp laceration    Sacral fracture noted         ). Plan:   Per physical therapy. Consults: physical therapy and occupational therapy. Advance diet as tolerated. Administer medications as ordered.    ( Start  On norvasc  For bp and get  Pain control      pulse rate  Already low

## 2021-03-10 NOTE — PLAN OF CARE
Problem: Pain:  Goal: Pain level will decrease  Description: Pain level will decrease  3/10/2021 1239 by [employer]  Outcome: Ongoing     Problem: Pain:  Goal: Pain level will decrease  Description: Pain level will decrease  3/10/2021 1236 by [employer]  Note: Pt report pain at 10 on scale. Pt states oral/iv/im medication helping to achieve pain goal of a 0 on scale. Problem: Pain:  Goal: Control of acute pain  Description: Control of acute pain  3/10/2021 1239 by [employer]  Outcome: Ongoing     Problem: Pain:  Goal: Control of acute pain  Description: Control of acute pain  3/10/2021 1236 by [employer]  Note: Pt report pain at 10 on scale. Pt states oral/iv/im medication helping to achieve pain goal of a 0 on scale. Problem: Falls - Risk of:  Goal: Will remain free from falls  Description: Will remain free from falls  3/10/2021 1239 by [employer]  Outcome: Ongoing     Problem: Falls - Risk of:  Goal: Will remain free from falls  Description: Will remain free from falls  3/10/2021 1236 by [employer]  Note: Up with 1 assist, walker, and gait belt, and with steady gait. Tolerates working with PT and OT well. Problem: Falls - Risk of:  Goal: Absence of physical injury  Description: Absence of physical injury  3/10/2021 1239 by [employer]  Outcome: Ongoing     Problem: Falls - Risk of:  Goal: Absence of physical injury  Description: Absence of physical injury  3/10/2021 1236 by [employer]  Note: No new physical injury noted. Problem: Skin Integrity:  Goal: Will show no infection signs and symptoms  Description: Will show no infection signs and symptoms  3/10/2021 1236 by [employer]  Note: No fevers, tachycardia, hypotension noted.  Pt denies any complaints      Problem: Skin Integrity:  Goal: Will show no infection signs and symptoms  Description: Will show no infection signs and symptoms  3/10/2021 1239 by [employer]  Outcome: Ongoing Problem: Skin Integrity:  Goal: Absence of new skin breakdown  Description: Absence of new skin breakdown  3/10/2021 1239 by Parth Valente  Outcome: Ongoing     Problem: Skin Integrity:  Goal: Absence of new skin breakdown  Description: Absence of new skin breakdown  3/10/2021 1236 by Parth Valente  Note: No new skin breakdown noted. Problem: SAFETY  Goal: Free from accidental physical injury  3/10/2021 1239 by Parth Valente  Outcome: Ongoing     Problem: SAFETY  Goal: Free from accidental physical injury  3/10/2021 1236 by Parth Valente  Note: Up with 1 assist, walker, and gait belt, and with steady gait. Tolerates working with PT and OT well. Problem: DISCHARGE BARRIERS  Goal: Patient's continuum of care needs are met  3/10/2021 1239 by Parth Valente  Outcome: Ongoing     Problem: DISCHARGE BARRIERS  Goal: Patient's continuum of care needs are met  3/10/2021 1236 by Parth Valente  Note: Pt plans assisted living vs. ECF at discharge. Care manager and social working helping with discharge needs. Problem: Nutrition  Goal: Optimal nutrition therapy  3/10/2021 1239 by Parth Valente  Outcome: Ongoing     Problem: Nutrition  Goal: Optimal nutrition therapy  3/10/2021 1236 by Parth Valente  Note: Patient tolerating general diet well. Care plan reviewed with patient. Patient unable to verbalize understanding of the plan of care and contribute to goal setting at this time.

## 2021-03-10 NOTE — PROGRESS NOTES
[facility]  OCCUPATIONAL THERAPY MISSED TREATMENT NOTE  [facility] 7K  7K-05/005-A      Date: 3/10/2021  Patient Name: Kassidy Geronimo        CSN: [id number]   : 10/3/1931  (80 y.o.)  Gender: female   Referring Practitioner: Tonio Gastelum PA-C  Diagnosis: Fall at [address]: Pt sleeping upon arrival requiring max tactile input to alert with difficulty staying awake. Nursing notified. Will attempt later today if time allows.

## 2021-03-10 NOTE — CARE COORDINATION
3/10/21, 12:48 PM EST    DISCHARGE PLANNING EVALUATION      Spoke with son, who is in agreement with [facility] before return to assisted living, which is recommendation from [address] [place] [address]. Referral completed to [facility], precert started today.

## 2021-03-11 NOTE — PROGRESS NOTES
[facility]  INPATIENT PHYSICAL THERAPY  DAILY NOTE  [facility] 7K - 7K-05/005-A      Time In: 08  Time Out: 8166  Timed Code Treatment Minutes: 9 Minutes  Minutes: 9          Date: 3/11/2021  Patient Name: Kamila Schuler,  Gender:  female        MRN: [id number]  : 10/3/1931  (80 y.o.)     Referring Practitioner: Thomas Sexton PA-C  Diagnosis: Fall at home, initial encounter  Additional Pertinent Hx: Pt admitted after having had a mechanical fall while at home. She had suffered S2 fx and CHI with scalp laceration. She had repair if her scalp laceration while at ER. She was seen by orthopedics and it was determined that her S2 fracture was not operable. She was cleared to participate in therapy as much as she tolerates. Prior Level of Function:  Lives With: Alone  Type of Home: Assisted living  Home Layout: One level  Home Access: Level entry  Home Equipment: Wheelchair-manual, Rolling walker(occasional use of RW prior)   Bathroom Accessibility: Accessible    Receives Help From: Family  ADL Assistance: Independent  Homemaking Assistance: Needs assistance  Homemaking Responsibilities: Yes  Ambulation Assistance: Independent  Transfer Assistance: Independent  Active : No  Additional Comments: Pt was ambulating short distances with the rolling walker. She would use the W/C for some activities such as transporting her laundry to/from the laundry room. Pt has delivered meals 7x/wk.     Restrictions/Precautions:  Restrictions/Precautions: Weight Bearing, General Precautions, Fall Risk  Right Lower Extremity Weight Bearing: Weight Bearing As Tolerated  Left Lower Extremity Weight Bearing: Weight Bearing As Tolerated  Position Activity Restriction  Other position/activity restrictions: Scalp wound repair-- pt [place] uses the pocket talker       SUBJECTIVE: pt scheduled for sacroplasty later today, pt very fearful of any mobility or even exercises in bed only able to get pt to complete a few exercises     PAIN: 10/10: however later in conversation she reported that she isn't having any pain at rest     Vitals: Vitals not assessed per clinical judgement, see nursing flowsheet    OBJECTIVE:  Bed Mobility:  Not Tested- attempted repositioning in bed she was dependent to boost up in bed       Exercise:  Patient was guided in 1 set(s) 10 reps of exercise to both lower extremities. Ankle pumps, Glut sets, Quad sets and attempted further ex however pt declined despite max encouragement . Exercises were completed for increased independence with functional mobility. Functional Outcome Measures: Completed  AM-PAC Inpatient Mobility Raw Score : 10  AM-PAC Inpatient T-Scale Score : 32.29    ASSESSMENT:  Assessment: pt only agreeable for a few exercises today, pt to have sacroplasty later today will monitor her mobility following procedure   Activity Tolerance:  Patient tolerance of  treatment: poor.  Due to pain      Equipment Recommendations:Equipment Needed: No  Discharge Recommendations:    Subacute/Skilled Nursing Facility(pt unsafe to return to apt w/o 24 hour assist)    Plan: Times per week: 5x O  Times per day: Daily  Current Treatment Recommendations: Strengthening, Home Exercise Program, Safety Education & Training, Balance Training, Endurance Training, Patient/Caregiver Education & Training, Functional Mobility Training, Gait Training, Transfer Training, Stair training    Patient Education  Patient Education: Plan of Care    Goals:  Patient goals : to move my body  Short term goals  Time Frame for Short term goals: by discharge  Short term goal 1: bed mobility with HOB flat and no use of rails, mod ind to increase functional ind  Short term goal 2: sit <> stand from various surfaces with use of 2WW and supervision assist in order to increase functional ind  Short term goal 3: amb 48' with use of 2WW and supervison assist in order to safely navigate home  Long term goals  Time Frame for Long term goals : NA due to short ELOS    Following session, patient left in safe position with all fall risk precautions in place.

## 2021-03-11 NOTE — PROGRESS NOTES
Spoke with Dr Ange Hudson over the phone. Stated pt is cleared for surgery and he will add that to his note.

## 2021-03-11 NOTE — PROGRESS NOTES
Progress Note  Date: 3/10/21      Room:[facility]05/005A  Patient Kenyon Pierce     YOB: 1931     Age:89 y.o. Subjective    Subjective:  Symptoms:  Stable. No shortness of breath, cough, chest pain or diarrhea. Diet:  Adequate intake. Activity level: Impaired due to weakness. Pain:  She complains of pain that is moderate. (Low back pain). Review of Systems   Constitutional: Positive for activity change (pain with movement). HENT: Negative for congestion, postnasal drip and rhinorrhea. Respiratory: Negative for apnea, cough, chest tightness and shortness of breath. Cardiovascular: Negative for chest pain and leg swelling. Gastrointestinal: Negative for abdominal distention, abdominal pain, constipation and diarrhea. Genitourinary: Negative for difficulty urinating. Musculoskeletal: Positive for back pain. Negative for arthralgias and gait problem. Psychiatric/Behavioral: Positive for agitation and confusion. Objective         Vitals Last 24 Hours:  TEMPERATURE:  Temp  Av.7 °F (36.5 °C)  Min: 97.4 °F (36.3 °C)  Max: 98.5 °F (36.9 °C)  RESPIRATIONS RANGE: Resp  Avg: 15.8  Min: 13  Max: 18  PULSE OXIMETRY RANGE: SpO2  Av.7 %  Min: 93 %  Max: 98 %  PULSE RANGE: Pulse  Av.9  Min: 48  Max: 60  BLOOD PRESSURE RANGE: Systolic (79DAA), UMS:959 , Min:99 , XNY:537   ; Diastolic (83TLO), TZF:16, Min:51, Max:72    I/O (24Hr): Intake/Output Summary (Last 24 hours) at 3/11/2021 0451  Last data filed at 3/11/2021 0330  Gross per 24 hour   Intake 100 ml   Output 1100 ml   Net -1000 ml     Objective:  General Appearance:  Comfortable. Vital signs: (most recent): Blood pressure 134/61, pulse 60, temperature 97.7 °F (36.5 °C), temperature source Oral, resp. rate 18, height 5' 11\" (1.803 m), weight 138 lb (62.6 kg), SpO2 95 %, not currently breastfeeding. Vital signs are normal.    Output: Producing urine and producing stool.     HEENT: Normal HEENT exam. the lumbar spine. No pars defects are noted. There is a slightly increased signal intensity in the distal thoracic spinal cord and tip of the conus. There are no gross abnormalities in the visualized aspects of the distal thoracic spine. On the axial images, at T12-L1, there is mild-to-moderate canal and bilateral foraminal stenosis At L1-L2, there is mild canal and mild to moderate bilateral foraminal stenosis, right greater than left. At L2-L3, there is a 1.4 mm bulging disc and facet hypertrophy. This results in mild to moderate canal and moderate bilateral foraminal stenosis At L3-L4, there are postoperative changes. There is a 1.8 mm bulging disc and facet hypertrophy. There is moderate bilateral foraminal stenosis with no canal stenosis. At L4-L5, there are postoperative changes. There is a 2.7 mm  bulging disc and facet hypertrophy. This results in mild to moderate canal and moderate to severe bilateral foraminal stenosis. At L5-S1, there is a 2.2 mm bulging disc and facet hypertrophy. This results in moderate canal and moderate to severe bilateral foraminal stenosis. There is degenerative change involving the sacroiliac joints bilaterally. There are multiple left renal cysts present. .      1. Old compression fracture involving the L1 vertebral body and status post instrumentation between approximately T10 and L2. 2. Postoperative changes at L3-4 and L4-5. 3. Abnormal signal intensity in S2, S3 and S4 sacral segments consistent with bone marrow edema suspicious for recent fractures.  4. Degenerative changes resulting in moderate canal and moderate to severe bilateral foraminal stenosis and L5-S1. 5. There is mild to moderate canal  and moderate to severe bilateral foraminal stenosis and L4-5. 6. There is mild to moderate   canal and moderate bilateral foraminal stenosis at L2-3. 7. There is mild canal and mild to moderate bilateral foraminal stenosis and L1-2. 8. There is moderate bilateral foramen stenosis

## 2021-03-11 NOTE — CARE COORDINATION
3/11/21, 11:23 AM EST    DISCHARGE PLANNING EVALUATION      [facility] precert complete. Facility can accept at discharge.
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
## 2021-03-11 NOTE — PROGRESS NOTES
This  arrived and attempted to provide spiritual care and emotional support to patient at 2145 GMT and found patient asleep. No encounter is made. Prayer is offered at patient's bedside for healing and strength. SC will follow up during next rounding to provide emotional support as needed.

## 2021-03-11 NOTE — BRIEF OP NOTE
Brief Postoperative Note      Patient: Connie Bartholomew  YOB: 1931  MRN: [id number]    Date of Procedure: 3/11/2021    Pre-Op Diagnosis: 1- BILATERAL SACRAL ALA FRACTURES                                2-OSTEOPOROSIS                                3-INTRACTABLE PAIN                                4-DEBILITY     Post-Op Diagnosis: Same       Procedure(s):  BILATERAL SACROPLASTY WITH BIOPSY    Surgeon(s):  Alexander David MD      Anesthesia: General    Estimated Blood Loss (mL): ,<5    Complications: None    Specimens:   ID Type Source Tests Collected by Time Destination   A : Bilateral Sacral Ala Tissue Spine SURGICAL PATHOLOGY Alexander David MD 3/11/2021 1827        Implants:  Implant Name Type Inv.  Item Serial No.  Lot No. LRB No. Used Action   CEMENT BNE Sobeida Kail  CEMENT BNE [address] [place] [address] [id number] N/A 1 Implanted         Drains:   Urethral Catheter 16 fr (Active)   Catheter Indications Urology/Urologist seeing this patient or inserted indwelling catheter 03/11/21 0903   Securement Device Date Changed 03/10/21 03/11/21 0903   Site Assessment No urethral drainage 03/11/21 0903   Urine Color Rosemary 03/11/21 0903   Urine Appearance Cloudy 03/11/21 0903   Urine Odor Malodorous 03/11/21 0903   Output (mL) 225 mL 03/11/21 1310       [REMOVED] Urethral Catheter 18 fr (Removed)   Catheter Indications Urology/Urologist seeing this patient or inserted indwelling catheter 03/10/21 1557   Urine Color Yellow 03/10/21 1557   Urine Appearance Clear 03/10/21 1557   Urine Odor Malodorous 03/10/21 0845   Output (mL) 0 mL 03/10/21 2024       Findings: sacral fractures    Electronically signed by Alexander David MD on 3/11/2021 at 6:57 PM

## 2021-03-11 NOTE — PLAN OF CARE
Problem: DISCHARGE BARRIERS  Goal: Patient's continuum of care needs are met  3/11/2021 0212 by Sharon Cantu RN  Outcome: Met This Shift  Note: Pt care needs met thi shift. Problem: Pain:  Goal: Pain level will decrease  Description: Pain level will decrease  3/11/2021 0212 by Sharon Cantu RN  Outcome: Ongoing  Note: Pt report pain at 4/10 on scale. Pt has available prn pain medication. Pt pain goal no pain. Problem: Falls - Risk of:  Goal: Will remain free from falls  Description: Will remain free from falls  3/11/2021 0212 by Sharon Cantu RN  Outcome: Ongoing  Note: Pt remain free from falls. Fall prevention measures in place. Telesitter present in room. Problem: Skin Integrity:  Goal: Will show no infection signs and symptoms  Description: Will show no infection signs and symptoms  3/11/2021 0212 by Sharon Cantu RN  Outcome: Ongoing  Note: No fevers, tachycardia, hypotension noted. Pt denies any complaints     Problem: Skin Integrity:  Goal: Absence of new skin breakdown  Description: Absence of new skin breakdown  3/11/2021 0212 by Sharon Cantu RN  Outcome: Ongoing  Note: Pt repositioned every two hours and prn. Problem: SAFETY  Goal: Free from accidental physical injury  3/11/2021 0212 by Sharon Cantu RN  Outcome: Ongoing  Note: Pt free from accidental physical injury. Problem: SAFETY  Goal: Free from accidental physical injury  3/10/2021 0112 by Richardson Bell  Note: Up with 1 assist, walker, and gait belt, Tolerates working with therapy.
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
## 2021-03-11 NOTE — PLAN OF CARE
Problem: Pain:  Goal: Pain level will decrease  Description: Pain level will decrease  3/11/2021 1103 by [place] Degree  Outcome: Ongoing  Note: Pt report pain at 0 on scale. Pt states oral/iv/im medication helping to achieve pain goal of a 1 on scale. Problem: Falls - Risk of:  Goal: Will remain free from falls  Description: Will remain free from falls  3/11/2021 1103 by [place] Degree  Outcome: Ongoing  Note: Pt in too much pain to walk. Problem: Falls - Risk of:  Goal: Absence of physical injury  Description: Absence of physical injury  Outcome: Ongoing  Note: No new physical injury noted. Problem: Skin Integrity:  Goal: Will show no infection signs and symptoms  Description: Will show no infection signs and symptoms  3/11/2021 1103 by Sal Degree  Outcome: Ongoing  Note: No fevers, tachycardia, hypotension noted. Pt denies any complaints      Problem: Skin Integrity:  Goal: Absence of new skin breakdown  Description: Absence of new skin breakdown  3/11/2021 1103 by Sal Degree  Outcome: Ongoing  Note: No new skin breakdown reported. Problem: SAFETY  Goal: Free from accidental physical injury  3/11/2021 1103 by [place] Degree  Outcome: Ongoing  Note: No new physical injury noted. Problem: DISCHARGE BARRIERS  Goal: Patient's continuum of care needs are met  3/11/2021 1103 by Sal Degree  Outcome: Ongoing  Note: Pt plans Assisted Living vs. ECF at discharge. Care manager and social working helping with discharge needs. Problem: Nutrition  Goal: Optimal nutrition therapy  3/11/2021 1103 by Sal Degree  Outcome: Ongoing  Note: Pt eating and drinking adequately. Care plan was unable to be reviewed with patient. Patient unable to verbalize understanding of the plan of care and contribute to goal setting.

## 2021-03-11 NOTE — PROGRESS NOTES
Rolling to Left: Maximum Assistance, with verbal cues     Rolling to Right: Maximum Assistance, with head of bed raised      FUNCTIONAL MOBILITY:  Assistive Device: None  Assist Level:  Not tested. Distance: None       ADDITIONAL ACTIVITIES:  Patient identified a personal goal to increase UB strength and improve overall endurance so they can complete their toilet & shower transfers; skilled edu on UE strengthening and patient completed BUE strengthening exercises x10 reps x1 set this date with AROM incorp. Shoulder flex./ext., protraction/retraction and all planes. Patient tolerated poor, requiring frequent rest breaks. Pt required visual verbal and tactile cues for technique. ASSESSMENT:     Activity Tolerance:  Patient tolerance of  treatment: poor. Discharge Recommendations: [address], Patient would benefit from continued therapy after discharge   Equipment Recommendations: Equipment Needed: Yes  Other: May benefit from a bedside commode, reacher or sock aid  Plan: Times per week: 6x  Specific instructions for Next Treatment: Functional transfers as able; ADLs while in sitting at the edge of bed; upper body exercises and relaxed breathing  Current Treatment Recommendations: Functional Mobility Training, Endurance Training, Self-Care / ADL, Safety Education & Training, Balance Training, Pain Management  Plan Comment: Pt would benefit from continued skilled OT services when medically stable and discharged from Acute. OT recommended while at [facility]. Patient Education  Patient Education: ADL's, Home Exercise Program and Importance of Increasing Activity    Goals  Short term goals  Time Frame for Short term goals: By discharge  Short term goal 1: Pt will complete BUE AROM/moderate resistance exercises while in supine and head of bed elevated to increase her endurance and strength for ease of doing self care and functional mobility.   Short term goal 2: Pt will complete sit tostand transfers with CGA and min vcs for safety to increase indep with toileting. Short term goal 3: Pt will complete simple ADLs while in sitting for over 8 minute duration with Setup A while using any adaptations neede to increase her independence with self care. Short term goal 4: Pt will complete short distance mobility with CGA and min vcs for safety to increase indep and safety with self cares. Following session, patient left in safe position with all fall risk precautions in place.

## 2021-03-11 NOTE — CARE COORDINATION
3/11/21, 2:42 PM EST    DISCHARGE ON [address] day: 3  Location: [facility]05/005-A Reason for admit: Fall at home, initial encounter [W19Mague Cummings, Y29.694]  Fall, initial encounter [id number]. XXXA]  Closed head injury, initial encounter [S09.90XA]   Procedure: planned 2066 today: Sacroplasty  Barriers to Discharge: Closed head injury. Neuro checks. Pain control. S2 fx - Ortho consult. Sacroplasty planned today. PT/OT WBAT. Pain management team consult  PCP: Hannah Louise MD  Readmission Risk Score: 14%  Patient Goals/Plan/Treatment Preferences: Sunil Rodriguez 47 at discharge. See SW notes.

## 2021-03-11 NOTE — PROGRESS NOTES
Patient arrives to the pre-op holding area. Nasal swabbing is performed and the patient's temperature is 97.9°F.

## 2021-03-11 NOTE — PROGRESS NOTES
Red Mares  Daily Progress Note  Pt Name: Red Pedroza  Medical Record Number: [id number]  Date of Birth 10/3/1931   Today's Date: 3/11/2021    HD: # 3    CC: \"Terrible\"    ASSESSMENT  1. Active Hospital Problems    Diagnosis Date Noted    Acute pain due to injury [G89.11]     Severe malnutrition ([facility] [E43] 03/08/2021     Class: Acute    Fall [W19. XXXA] 03/08/2021    Fall at home [M90. Ankita Lopez, V35.124] 03/06/2021    Closed head injury [S09.90XA] 03/06/2021    Scalp laceration, initial encounter [S01.01XA] 03/06/2021    Sacral fracture ([facility] [S32.10XA] 03/06/2021         PLAN  Patient admitted to [address] [place] [address] under Trauma Services      Fall at Assisted Living              - PT/OT               - Up with assistance              - Fall risk precautions     Closed head injury, parietal scalp laceration              - SLP cog eval indicates need for 24 hour supervision, no driving, med assistance and assistance with financial decisions              - Neuro checks              - Pain control              - Local wound care              - Remove staples 03/19/21     Sacral fracture at S2 level              - Orthopedic spine managing conservatively & have signed off              - Okay to work with PT/OT, no restrictions              - Pain control              - Neuro checks   -Pain management consulted, ordered MRI lumbar spine for consideration of kyphoplasty   - Plan for kyphoplasty today with Dr. Virginia Cope     History of Hypertension, CHF, CKD, and Neurogenic bladder              - Home blood pressure medications restarted & Norvasc added 3/9 by PCP              - Chronic sanches in place    - Bactrim was prescribed on 3/2 to 3/12 for report UTI    - UA notes only small leukocytes, otherwise asymptomatic, continue to hold ATB for now due to no evidence of improved outcomes in clinical research.      - Repeat UA secondary to confusion 3/9    - Omari Verde started yesterday - Family medicine following   - Home ferrous sulfate reordered              - Continue to monitor     Pain Management              -Tylenol & Ultram PRN   - Was given dose of Norco on 3/8 with side effect of increased confusion   - Pain management consulted     Prophylaxis: SCD's, Incentive Spirometry, Colace, Pepcid, Zofran     NPO    Regular Neurovascular Checks  PT/OT/SLP continue to treat as warranted     Planned Discharge to pending clinical course   - From [facility], an [address] notes have been faxed to [facility] for review. They have yet to determine if they are able to provide the care that Kings Koroma needs. If they are not able to provide this care, according to therapy notes, she will need ECF placement. - Precert to [facility] approved, discharge pending post op course         SUBJECTIVE  She is a 80 y.o. female who continues to present at [address] [place] [address] on [address] [facility] following a fall at home. Patient reports he continues to have 10/10 pain in lower back. She denied any other pain or complaints beside low back pain. Patient denies headache, lightheadedness, dizziness, chest pain, shortness breath, abdominal pain, nausea/vomiting, and paresthesias. Plan for kyphoplasty today with Dr. April Solomon.     Wt Readings from Last 3 Encounters:   03/11/21 138 lb (62.6 kg)   05/14/19 168 lb (76.2 kg)   05/06/19 180 lb 2 oz (81.7 kg)     Temp Readings from Last 3 Encounters:   03/11/21 97.9 °F (36.6 °C) (Oral)   05/18/19 98.1 °F (36.7 °C) (Oral)   06/07/18 98.4 °F (36.9 °C)     BP Readings from Last 3 Encounters:   03/11/21 (!) 153/68   12/15/20 (!) 182/71   05/18/19 (!) 128/59     Pulse Readings from Last 3 Encounters:   03/11/21 56   12/15/20 59   05/18/19 63       24 HR INTAKE/OUTPUT :     Intake/Output Summary (Last 24 hours) at 3/11/2021 1259  Last data filed at 3/11/2021 0330  Gross per 24 hour   Intake 100 ml   Output 1100 ml   Net -1000 ml     Diet NPO, After Midnight    OBJECTIVE CURRENT VITALS BP (!) 153/68   Pulse 56   Temp 97.9 °F (36.6 °C) (Oral)   Resp 16   Ht 5' 11\" (1.803 m)   Wt 138 lb (62.6 kg)   SpO2 95%   BMI 19.25 kg/m²   GENERAL: In no acute distress and well nourished. SKIN: Appropriate for ethnicity, warm and dry. EYES: PERRL 3mm  CARDIO: Strong/regular S1/S2 rate and rhythm. No rubs murmurs, or gallops. 2+ radial and dorsalis pedal pulses. Capillary refill <2 sec. No extremity edema noted. PULMONARY:  Lung sounds are clear throughout bilaterally. No wheezing, crackles, or rhonchi. ABDOMEN: Abdomen is non distended. Bowel sounds present in all four quadrants. Soft and non tender to palpation in all quadrants. NEURO: Follows commands. PMS intact, moves limbs freely. No focal neurological deficits  MSK: No new tenderness to muscular or bony structure palpation.  strength 5/5 and equal bilaterally. WOUND: Laceration to posterior scalp with staples in place. No bleeding or drainage noted. LABS  CBC :   Recent Labs     03/09/21 0435   WBC 7.3   HGB 11.0*   HCT 34.2*   MCV 96.6        BMP:   Recent Labs     03/09/21 0435      K 3.8      CO2 25   BUN 8   CREATININE 0.4     COAGS:   Recent Labs     03/09/21 0435   PROT 5.8*     Pancreas/HFP:  No results for input(s): LIPASE, AMYLASE in the last 72 hours. Recent Labs     03/09/21 0435   AST 17   ALT 15   BILITOT 0.6   ALKPHOS 89     RADIOLOGY  No new images      Electronically signed by Gianluca Carter PA-C on 3/11/2021 at 12:59 PM     Patient seen and examined independently by me earlier this AM. Above discussed and I agree with KASIE Rubi. See my additional comments below for updated orders and plan. Labs, cultures, and radiographs where available were reviewed. I discussed patient concerns with the patient's nurse and instructions were given. Please see our orders for the updated patient care plan. -Pain control really no better this morning. Planning kyphoplasty today.   Denies nausea or vomiting. SCDs for DVT prophylaxis. Pulmonary toileting. Planning ECF when pain improved status post kyphoplasty. Otherwise, stable from a trauma standpoint.     Electronically signed by Vivek Aquino MD on 3/11/21 at 3:20 PM EST

## 2021-03-12 NOTE — PLAN OF CARE
Problem: Nutrition  Goal: Optimal nutrition therapy  3/12/2021 1233 by Stacie Ramos RD, LD  Outcome: Ongoing  Nutrition Problem #1: Severe malnutrition  Intervention: Food and/or Nutrient Delivery: Continue Current Diet, Continue Oral Nutrition Supplement  Nutritional Goals: Patient will consume 75% or more of meals during LOS.

## 2021-03-12 NOTE — PROGRESS NOTES
Back to floor from surgery. Assisted by student nurse. Turned repositioned. Pt alert to person/place. Reoriented to situation. Tele sitter updated on return to room. Bed in low locked position.

## 2021-03-12 NOTE — PROGRESS NOTES
[facility]  Daily Progress Note    Pt Name: Marleni Kim  Medical Record Number: [id number]  Date of Birth 10/3/1931   Today's Date: 3/12/2021    HD: # 4    CC: \"I don't know how I'm going to get up. \"  [address] Problems    Diagnosis Date Noted    Acute pain due to injury [G89.11]     Severe malnutrition ([address] [E43] 03/08/2021     Class: Acute    Fall [W19. XXXA] 03/08/2021    Fall at home [U90. Jalil Haw, H06.308] 03/06/2021    Closed head injury [S09.90XA] 03/06/2021    Scalp laceration, initial encounter [S01.01XA] 03/06/2021    Sacral fracture ([facility] [address] [S32.10XA] 03/06/2021     Procedures:  3/11/21 - Bilateral sacroplasty with biopsy with Dr. Corby Zepeda  Patient admitted under Trauma services to [address] [place] [address].     Fall at [facility]              - PT/OT              - Up with assistance              - Fall risk precautions     Closed head injury              - SLP cog eval indicates need for 24 hour supervision, no driving, med assistance and assistance with financial decisions   - Limited stimulation brain injury guidelines              - Neuro checks              - Antiemetics and pain control    Parietal scalp laceration   - Stapled in ED, remove staples 3/19/21   - Local wound care   - Ice PRN   - Pain control   - Tdap updated in ED     Sacral fracture at S2 level              - Orthopedic spine consulted, recommended conservative management and signed off.   - Pain management was consulted 3/9/21, MRI lumbar spine was completed 3/10/21   - POD #1 S/p Bilateraly sacroplasty with biopsy with Dr. Jacob Byrne              - Neuro checks   - PT/OT   - Pain control     History of Hypertension, CHF, CKD, and Neurogenic bladder              - Home blood pressure medications restarted & Norvasc added 3/9 by PCP              - Chronic sanches in place    - Bactrim was prescribed on 3/2 to 3/12 for report UTI    - UA notes only small leukocytes, otherwise asymptomatic, continue to hold ATB for now due to no evidence of improved outcomes in clinical research. - Repeat UA secondary to confusion 3/9    - Macrobid started 3/10    - Family medicine on board   - Home ferrous sulfate reordered              - Continue to monitor     Pain Management              - Tylenol & Ultram PRN   - Was given dose of Norco on 3/8 with side effect of increased confusion   - Pain management consulted     Prophylaxis: SCD's, Incentive Spirometry, Colace, Pepcid, Zofran     General diet    Regular Neurovascular Checks  PT/OT/SLP eval and treat     Planned Discharge to pending clinical course   - From [facility], [address] notes have been faxed to [facility] for review. They have yet to determine if they are able to provide the care that Arcenio Little needs. If they are not able to provide this care, according to therapy notes, she will need ECF placement. - Precert to [facility] approved, discharge pending post op course. - 3/12 Potentially can be discharged today pending OK with Pain management and she is able to tolerate therapies postoperatively.       SUBJECTIVE  Patient seen on 7K this morning. She is resting quietly in bed. She is POD #1 S/p Bilateral sacroplasty with biopsy. She denies any pain as long as she is laying still but states she has quite a bit of pain when she sits up or moves. She states she is worried about working with therapy today because moving is so uncomfortable. She states she is otherwise doing well. She is tolerating diet. Plan is to work with therapy today, she will be going to [facility] at discharge. She remains stable from a Trauma perspective.     Wt Readings from Last 3 Encounters:   03/12/21 136 lb 11.2 oz (62 kg)   05/14/19 168 lb (76.2 kg)   05/06/19 180 lb 2 oz (81.7 kg)     Temp Readings from Last 3 Encounters:   03/12/21 97.7 °F (36.5 °C) (Oral)   05/18/19 98.1 °F (36.7 °C) (Oral)   06/07/18 98.4 °F (36.9 °C)     BP Readings from Last 3 Encounters:   03/12/21 (!) 115/56   03/11/21 135/61   12/15/20 (!) 182/71     Pulse Readings from Last 3 Encounters:   03/12/21 77   12/15/20 59   05/18/19 63     24 HR INTAKE/OUTPUT :     Intake/Output Summary (Last 24 hours) at 3/12/2021 0911  Last data filed at 3/12/2021 0054  Gross per 24 hour   Intake 200 ml   Output 525 ml   Net -325 ml     DIET GENERAL;    OBJECTIVE  CURRENT VITALS BP (!) 115/56   Pulse 77   Temp 97.7 °F (36.5 °C) (Oral)   Resp 16   Ht 5' 11\" (1.803 m)   Wt 136 lb 11.2 oz (62 kg)   SpO2 98%   BMI 19.07 kg/m²     GENERAL: Awake, alert, NAD. Very hard of hearing. NEURO: PERRL. Moves extremities and follows commands. PULMONARY: Clear to auscultation bilaterally with normal work of breathing. CARDIO: Strong/regular S1/S2 rate and rhythm. No rubs murmurs, or gallops. 2+ radial and dorsalis pedal pulses. Capillary refill <2 sec. No extremity edema noted. ABDOMEN: Soft, nontender, nondistended with normoactive bowel sounds. WOUND: Laceration to posterior scalp with staples in place. No bleeding or drainage noted. SKIN: Appropriate for ethnicity, warm and dry. EXTREMITIES: No cyanosis and no clubbing. LABS  CBC :   No results for input(s): WBC, HGB, HCT, MCV, PLT in the last 72 hours. BMP:   No results for input(s): NA, K, CL, CO2, BUN, CREATININE in the last 72 hours. COAGS:   No results for input(s): APTT, PROT, INR in the last 72 hours. Pancreas/HFP:  No results for input(s): LIPASE, AMYLASE in the last 72 hours. No results for input(s): AST, ALT, BILIDIR, BILITOT, ALKPHOS in the last 72 hours. RADIOLOGY  No new images    Electronically signed by Arben Pace PA-C on 3/12/2021 at 9:11 AM     Patient seen and examined independently by me early this AM. Above discussed and I agree with KASIE Guy. See my additional comments below for updated orders and plan.  Labs, cultures, and radiographs where available were reviewed. I discussed patient concerns with the patient's nurse and instructions were given. Please see our orders for the updated patient care plan. -Status post bilateral sacral plasty with biopsy. Pain control still seems to be an issue. Concerned about working with therapy. DVT prophylaxis. Advancing diet. Pain management service following. Pulmonary toileting. Stable from a trauma standpoint. When pain better controlled and mobility improve then ECF.     Electronically signed by Virginia Kwok MD on 3/12/21 at 2:07 PM EST

## 2021-03-12 NOTE — PROGRESS NOTES
[facility]  INPATIENT PHYSICAL THERAPY  DAILY NOTE  [facility] ORTHOPEDICS 7K - 7K-05/005-A      Time In: 1266  Time Out: 0935  Timed Code Treatment Minutes: 28 Minutes  Minutes: 28          Date: 3/12/2021  Patient Name: Dede Medrano,  Gender:  female        MRN: [id number]  : 10/3/1931  (80 y.o.)     Referring Practitioner: Lorna Sierra PA-C  Diagnosis: Fall at home, initial encounter  Additional Pertinent Hx: Pt admitted after having had a mechanical fall while at home. She had suffered S2 fx and CHI with scalp laceration. She had repair if her scalp laceration while at ER. She was seen by orthopedics and it was determined that her S2 fracture was not operable. She was cleared to participate in therapy as much as she tolerates. Prior Level of Function:  Lives With: Alone  Type of Home: Assisted living  Home Layout: One level  Home Access: Level entry  Home Equipment: Wheelchair-manual, Rolling walker(occasional use of RW prior)   Bathroom Accessibility: Accessible    Receives Help From: Family  ADL Assistance: Independent  Homemaking Assistance: Needs assistance  Homemaking Responsibilities: Yes  Ambulation Assistance: Independent  Transfer Assistance: Independent  Active : No  Additional Comments: Pt was ambulating short distances with the rolling walker. She would use the W/C for some activities such as transporting her laundry to/from the laundry room. Pt has delivered meals 7x/wk.     Restrictions/Precautions:  Restrictions/Precautions: Weight Bearing, General Precautions, Fall Risk  Right Lower Extremity Weight Bearing: Weight Bearing As Tolerated  Left Lower Extremity Weight Bearing: Weight Bearing As Tolerated  Position Activity Restriction  Other position/activity restrictions: Scalp wound repair-- pt [place] uses the pocket talker- 3/11 pt had sacroplasty       SUBJECTIVE: nursing ok'd therapy- pt resting in bed she needed much encouragement to participate she was fearful of pain with mobility - pt had been incont of BM during session and needed max encouragement to allow us to get her cleaned up     PAIN: pt rated pain 10/10 more so in lower back but above the area of her sacroplasty       Vitals: Vitals not assessed per clinical judgement, see nursing flowsheet    OBJECTIVE:  Bed Mobility:  Rolling to Left: Maximum Assistance   Rolling to Right: Maximum Assistance   Supine to Sit: Maximum Assistance, with extra time to complete activity she did a good job of walking her LEs over to the edge of bed but didn't tolerate pushing with her UEs to bring trunk up   Sit to Supine: Maximum Assistance, guided at trunk and LEs      Transfers:  Sit to Stand: Moderate Assistance  Stand to Sit:Moderate Assistance    Ambulation:  Not tested pt not even able to side step to [facility]     Balance:  static standing at walker with CGA to min assist for balance she stood for nearly 5 min for change of bedding and for deann care and needed encouragement to stay standing for activity     Exercise:  Patient was guided in 1 set(s) 10 reps of exercise to both lower extremities. Ankle pumps. Exercises were completed for increased independence with functional mobility. Functional Outcome Measures: Completed  AM-PAC Inpatient Mobility Raw Score : 10  -PAC Inpatient T-Scale Score : 32.29    ASSESSMENT:  Assessment: pt cont to c/o of pain with mobility, she would greatly benefit from cont skilled therapy   Activity Tolerance:  Patient tolerance of  treatment: fair.  Limited due to pain        Equipment Recommendations:Equipment Needed: No  Discharge Recommendations:    Subacute/Skilled Nursing Facility(pt unsafe to return to apt w/o 24 hour assist)    Plan: Times per week: 5x O  Times per day: Daily  Current Treatment Recommendations: Strengthening, Home Exercise Program, Safety Education & Training, Balance Training, Endurance Training, Patient/Caregiver Education & Training, Functional Mobility Training, Gait Training, Transfer Training, Stair training    Patient Education  Patient Education: Plan of Care    Goals:  Patient goals : to move my body  Short term goals  Time Frame for Short term goals: by discharge  Short term goal 1: bed mobility with HOB flat and no use of rails, mod ind to increase functional ind  Short term goal 2: sit <> stand from various surfaces with use of 2WW and supervision assist in order to increase functional ind  Short term goal 3: amb 48' with use of 2WW and supervison assist in order to safely navigate home  Long term goals  Time Frame for Long term goals : NA due to short ELOS    Following session, patient left in safe position with all fall risk precautions in place.

## 2021-03-12 NOTE — PROGRESS NOTES
Progress Note  Date: 3/11/21     Room:[facility]05[facility]-A  Patient Óscar Jaimes     YOB: 1931     Age:89 y.o. Subjective    Subjective:  Symptoms:  Stable. She reports weakness. No shortness of breath, cough or chest pain. Diet:  Adequate intake. Activity level: Impaired due to weakness. Pain:  She complains of pain that is moderate. ( Low  Back pain). Review of Systems   Constitutional: Positive for activity change. HENT: Positive for hearing loss. Respiratory: Negative for cough and shortness of breath. Cardiovascular: Negative for chest pain, palpitations and leg swelling. Gastrointestinal: Negative for abdominal distention and abdominal pain. Genitourinary: Negative for difficulty urinating and hematuria. Musculoskeletal: Positive for back pain. Negative for arthralgias. Sacral pain noted   Neurological: Positive for weakness. Negative for dizziness and facial asymmetry. Hematological: Negative for adenopathy. Psychiatric/Behavioral: Positive for confusion. Objective         Vitals Last 24 Hours:  TEMPERATURE:  Temp  Av.1 °F (36.7 °C)  Min: 97.7 °F (36.5 °C)  Max: 98.7 °F (37.1 °C)  RESPIRATIONS RANGE: Resp  Avg: 10.3  Min: 0  Max: 32  PULSE OXIMETRY RANGE: SpO2  Av.4 %  Min: 95 %  Max: 100 %  PULSE RANGE: Pulse  Av.8  Min: 56  Max: 87  BLOOD PRESSURE RANGE: Systolic (18WKS), RUH:799 , Min:94 , JSU:082   ; Diastolic (00VAU), VBI:05, Min:50, Max:81    I/O (24Hr): Intake/Output Summary (Last 24 hours) at 3/12/2021 0448  Last data filed at 3/12/2021 0054  Gross per 24 hour   Intake 200 ml   Output 525 ml   Net -325 ml     Objective:  General Appearance:  Comfortable. Vital signs: (most recent): Blood pressure (!) 115/56, pulse 77, temperature 97.7 °F (36.5 °C), temperature source Oral, resp. rate 16, height 5' 11\" (1.803 m), weight 138 lb (62.6 kg), SpO2 98 %, not currently breastfeeding.   Vital signs are normal.    Output: Producing urine and producing stool. HEENT: Normal HEENT exam.    Lungs:  Normal effort and normal respiratory rate. Breath sounds clear to auscultation. Heart: Normal rate. Regular rhythm. S1 normal and S2 normal.  Positive for murmur (2/6 brian). Abdomen: Abdomen is soft. Bowel sounds are normal.   There is no abdominal tenderness. Extremities: Decreased range of motion. (Low back pain noted)  Skin:  Warm and dry. No rash. Labs/Imaging/Diagnostics    Labs:  CBC:No results for input(s): WBC, RBC, HGB, HCT, MCV, RDW, PLT in the last 72 hours. CHEMISTRIES:No results for input(s): NA, K, CL, CO2, BUN, CREATININE, GLUCOSE, PHOS, MG in the last 72 hours. Invalid input(s): CA  PT/INR:No results for input(s): PROTIME, INR in the last 72 hours. APTT:No results for input(s): APTT in the last 72 hours. LIVER PROFILE:No results for input(s): AST, ALT, BILIDIR, BILITOT, ALKPHOS in the last 72 hours. Imaging Last 24 Hours:  Fluoro For Surgical Procedures    Result Date: 3/11/2021  Radiology exam is complete. No Radiologist dictation. Please follow up with ordering provider. Assessment//Plan           Hospital Problems           Last Modified POA    Fall at home 3/10/2021 Yes    Closed head injury 3/6/2021 Yes    Scalp laceration, initial encounter 3/6/2021 Yes    Sacral fracture ([address] 3/6/2021 Yes    Severe malnutrition ([address] 3/8/2021 Yes    Fall 3/8/2021 Yes    Acute pain due to injury 3/9/2021 Yes        Assessment:    Condition: In stable condition. Unchanged. ( Sacral fracture and pain with any activity  Or movement      htn note better and pulse rate  Slight low      anxiety and with alzheimers  Worse and with pain and lack of sleep better  With the seroquel       aortic stenosis  Moderate  On echo and LBB from before      ). Plan:   Per physical therapy. Consults: Pain management. Diet Plan: meds today only. Administer medications as ordered.    ( For ablation of sacral nerves Today     stable for anesthesia from a medical standpoint  As mild risk due to age  But cardiac and pulmonary clear for surgery     bp slight low and clonidine prn if high and drop the lopressor and norvasc dose ).        Electronically signed by Isis Padilla MD on 3/12/21 at 4:48 AM EST

## 2021-03-12 NOTE — PROGRESS NOTES
1905 pt arrived to PACU, awake and alert.  Unable to answer questions at this time, waiting for hearing device from RN  1927 c/o pain 10/10, medicated with 50 mcg fentanyl  1935 no change in pain status, medicated with 50 mcg fentanyl  1945 pt states pain is \"a lot better\"  1955 meets criteria for discharge from PACU, transported to [address] [place] [address]

## 2021-03-12 NOTE — PROGRESS NOTES
[address] [address]  INPATIENT SPEECH THERAPY  [facility]  DAILY NOTE    TIME   SLP Individual Minutes  Time In: 1501  Time Out: 1215  Minutes: 15  Timed Code Treatment Minutes: 15 Minutes       Date: 3/12/2021  Patient Name: Maridee Curling      CSN: [id number]   : 10/3/1931  (80 y.o.)  Gender: female   Referring Physician:  Tremaine Aldana PA-C  Diagnosis:  Fall at home, initial encounter   Secondary Diagnosis: Cognitive deficits   Precautions: Fall Risk, [place]   Current Diet: Regular diet with thin liquids   Swallowing Strategies: Standard Universal Swallow Precautions  Date of Last MBS/FEES: Not Applicable    Pain:  No pain reported. Subjective:  Patient seen sitting upright in chair; alert and cooperative provided continuous encouragement. Patient continues to request to \"go back to bed I am in so much pain. \" No family present     Short-Term Goals:  SHORT TERM GOAL #1:  Goal 1: Patient will complete recall, short term memory and working memory tasks with 70% accuracy provided mod cues to improve overall recall of daily and medical information  INTERVENTIONS:   Orientation:   Month-indep  Date-incorrect   Year-indep  Location-correct provided FO2  City-indep  Time-indep  Etiology-indep  Deficits-unable to generate despite max cues     SHORT TERM GOAL #2:  Goal 2: Patient will complete problem solving/reasoning tasks with 70% accurcy provided mod cues to improve safety within hospital and home setting  INTERVENTIONS: DNT secondary to focus on other goals     SHORT TERM GOAL #3:  Goal 3: Patient will complete sequencing and divergent naming tasks with 70% accuracy provided mod cues to improve overall thought organization  INTERVENTIONS: Thought organization via organizing deck of cards by suite-100% accuracy     SHORT TERM GOAL #4:  Goal 4: Patient will complete sustained, divided and alterntating attention tasks with no more than x2 errors within 2 minutes in order to improve completion of multi-step

## 2021-03-12 NOTE — CARE COORDINATION
3/12/21, 9:19 AM EST    DISCHARGE ON 1501 [address] day: 4  Location: [facility]05/005-A Reason for admit: Fall at home, initial encounter [W19. Corrina Méndez, F37.527]  Fall, initial encounter [id number]. XXXA]  Closed head injury, initial encounter [S09.90XA]   Procedure: 3/12/21 Kypoplasty  Barriers to Discharge: General diet now ordered. PT/OT ordered post kyphoplasty. Pain management team. Neuro checks. PCP: Corin Cuello MD  Readmission Risk Score: 12%  Patient Goals/Plan/Treatment Preferences: [facility] at discharge. See SW notes.

## 2021-03-12 NOTE — CARE COORDINATION
3/12/21, 4:39 PM EST    [facility] can accept at discharge. Transfer packet on chart for discharge when ready.

## 2021-03-12 NOTE — PROGRESS NOTES
activity to complete 1 and 2 hand release to focus on balance, core strength, endurance, safety with SBA with verbal cues provided for encouragment; patient receptive. Patient identified one of their personal goals is to be able to sustain functional standing positions in order to complete various ADL and IADL skills in standing position, such as sinkside grooming or washing dishes. Dynamic standing task was then facilitated to challenge endurance, balance. Patient required Min A with RW, and demo'ed an endurance of ~4 minutes. BED MOBILITY:  Supine to Sit: Stand By Assistance increased time to complete    TRANSFERS:  Sit to Stand:  Maximum Assistance. max verbal cues for technique as patient demonstrates posterior lean when standing  Stand to Sit: Minimal Assistance. verbal cues for hand placement and body positioning; demonstrating understanding    FUNCTIONAL MOBILITY:  Assistive Device: Rolling Walker  Assist Level:  Minimal Assistance. Distance: EOB to bedside chair x 5ft; increased time  Slow pace; unsteady gait, no LOB     ADDITIONAL ACTIVITIES:  Patient identified a personal goal to increase UB strength and improve overall endurance so they can complete their toilet & shower transfers; skilled edu on UE strengthening and patient completed BUE strengthening exercises x5 reps x2 set this date AROM in all joints and all planes. Patient tolerated fair, requiring minimal rest breaks between sets. Pt required minimal verbal/visual cues for technique. ASSESSMENT:     Activity Tolerance:  Patient tolerance of  treatment: fair.        Discharge Recommendations: [address], Patient would benefit from continued therapy after discharge   Equipment Recommendations: Equipment Needed: Yes  Other: May benefit from a bedside commode, reacher or sock aid  Plan: Times per week: 6x  Specific instructions for Next Treatment: Functional transfers as able; ADLs while in sitting at the edge of bed; upper body exercises and relaxed breathing  Current Treatment Recommendations: Functional Mobility Training, Endurance Training, Self-Care / ADL, Safety Education & Training, Balance Training, Pain Management  Plan Comment: Pt would benefit from continued skilled OT services when medically stable and discharged from Acute. OT recommended while at [facility]. Patient Education  Patient Education: Plan of Care, ADL's, Home Exercise Program and Importance of Increasing Activity    Goals  Short term goals  Time Frame for Short term goals: By discharge  Short term goal 1: Pt will complete BUE AROM/moderate resistance exercises while in supine and head of bed elevated to increase her endurance and strength for ease of doing self care and functional mobility. Short term goal 2: Pt will complete sit tostand transfers with CGA and min vcs for safety to increase indep with toileting. Short term goal 3: Pt will complete simple ADLs while in sitting for over 8 minute duration with Setup A while using any adaptations neede to increase her independence with self care. Short term goal 4: Pt will complete short distance mobility with CGA and min vcs for safety to increase indep and safety with self cares. Following session, patient left in safe position with all fall risk precautions in place.

## 2021-03-12 NOTE — ANESTHESIA POSTPROCEDURE EVALUATION
Department of Anesthesiology  Postprocedure Note    Patient: Maridee Curling  MRN: [id number]  YOB: 1931  Date of evaluation: 3/11/2021  Time:  8:00 PM     Procedure Summary     Date: 03/11/21 Room / Location: [place] [facility]  / [place] [facility]    Anesthesia Start: 1726 Anesthesia Stop: 1908    Procedure: BILATERAL SACROPLASTY (N/A ) Diagnosis: (FALL AT HOME)    Surgeons: Jessica Meredith MD Responsible Provider: Karen Hdz MD    Anesthesia Type: general ASA Status: 4          Anesthesia Type: general    Raphael Phase I: Raphael Score: 9    Raphael Phase II:      Last vitals: Reviewed and per EMR flowsheets.        Anesthesia Post Evaluation    Patient location during evaluation: PACU  Patient participation: complete - patient participated  Level of consciousness: awake  Airway patency: patent  Nausea & Vomiting: no nausea and no vomiting  Complications: no  Cardiovascular status: hemodynamically stable  Respiratory status: acceptable  Hydration status: stable

## 2021-03-12 NOTE — PROGRESS NOTES
Comprehensive Nutrition Assessment    Type and Reason for Visit:  Reassess    Nutrition Recommendations/Plan:   Consider appetite stimulant. Will continue Ensure Enlive TID - encouraged consumption. Recommend MVI. Nutrition Assessment:    Pt. with no improvement from a nutritional standpoint AEB ongoing poor po intake, not drinking ONS despite liking them. Remains at risk for further nutritional compromise r/t severe malnutrition, increased nutrient needs for wound healing and underlying medical condition (hx HTN, CHF, CKD). Nutrition recommendations/interventions as per above. Malnutrition Assessment:  Malnutrition Status:  Severe malnutrition    Context:  Acute Illness     Findings of the 6 clinical characteristics of malnutrition:  Energy Intake:  (50% or less x 1 day)  Weight Loss:  Unable to assess(18% loss over ~3 months per patient report, no weight records per EMR)     Body Fat Loss:  7 - Moderate body fat loss Orbital   Muscle Mass Loss:  7 - Moderate muscle mass loss Temples (temporalis), Clavicles (pectoralis & deltoids)  Fluid Accumulation:  No significant fluid accumulation     Strength:  Not Performed    Estimated Daily Nutrient Needs:  Energy (kcal):  6625-0817 kcals (28-32); Weight Used for Energy Requirements:  (63kgm on 3/6)     Protein (g):  75-95 grams (1.2-1.5); Weight Used for Protein Requirements:  (63kgm on 3/6)              Nutrition Related Findings:  Pt. seen - very thin, frail; states poor appetite - not hungry; denies any nausea or abdominal pain; states likes Ensure Enlive but not drinking; 1 BM noted past 48 hours;  Rx includes Colace, Senokot, Glycolax, Phenergan      Wounds:  (left radial skin tear, 3/6: repair of scalp laceration)       Current Nutrition Therapies:    DIET GENERAL;  Dietary Nutrition Supplements: Standard High Calorie Oral Supplement    Anthropometric Measures:  · Height: 5' 11\" (180.3 cm)  · Current Body Weight: 136 lb 11.2 oz (62 kg)(3/12 no edema)   · Admission Body Weight: 139 lb 3.2 oz (63.1 kg)(3/6; no edema)    · Usual Body Weight: (170# ~3 months ago per patient; no weight records per EMR)     · Ideal Body Weight: 155 lbs;     · BMI: 19.1  · BMI Categories: Underweight (BMI less than 22) age over 72       Nutrition Diagnosis:   · Severe malnutrition related to inadequate protein-energy intake as evidenced by moderate muscle loss, moderate loss of subcutaneous fat      Nutrition Interventions:   Food and/or Nutrient Delivery:  Continue Current Diet, Continue Oral Nutrition Supplement  Nutrition Education/Counseling:  Education initiated(encouraged intake at best effort, use of ONS to aid in recovery)   Coordination of Nutrition Care:  Continue to monitor while inpatient    Goals:  Patient will consume 75% or more of meals during LOS. Nutrition Monitoring and Evaluation:   Behavioral-Environmental Outcomes:  None Identified   Food/Nutrient Intake Outcomes:  Diet Advancement/Tolerance, Food and Nutrient Intake, Supplement Intake  Physical Signs/Symptoms Outcomes:  Biochemical Data, Chewing or Swallowing, GI Status, Fluid Status or Edema, Nutrition Focused Physical Findings, Skin, Weight     Discharge Planning:     Too soon to determine     Electronically signed by Alfred Pacheco RD, LD on 3/12/21 at 12:34 PM EST    Contact: [phone]

## 2021-03-12 NOTE — OP NOTE
Operative Note    Pre-Procedure Note    Patient Name: Mercy Leon   YOB: 1931  Medical Record Number: [id number]  Date: 3/11/21    Indication:                                    1- BILATERAL SACRAL ALA FRACTURES                                2-OSTEOPOROSIS                                3-INTRACTABLE PAIN                                4-DEBILITY   Consent: On file. Vital Signs:   Vitals:    03/11/21 1614   BP: (!) 163/72   Pulse: 65   Resp: 18   Temp: 98.2 °F (36.8 °C)   SpO2:        Past Medical History:   has a past medical history of Arthritis, Bilateral carotid artery disease ([facility] Breast CA ([facility] Chronic diastolic CHF (congestive heart failure) ([facility] Chronic kidney disease, Colon, diverticulosis, GERD (gastroesophageal reflux disease), Hiatal hernia with gastroesophageal reflux, Hypertension, Insomnia, LBBB (left bundle branch block), Movement disorder, Neurogenic bladder, Osteoporosis, and Pneumonia. Past Surgical History:   has a past surgical history that includes Mastectomy (right 1987; left 1991); Lumbar disc arthroplasty (1989; 1998); Total hip arthroplasty (right 1994; left 1996); Total knee arthroplasty (left and right 1998); Cataract removal (2011); Colonoscopy (11/13); Upper gastrointestinal endoscopy (2013); laminectomy (7/7/2012); shoulder surgery (Left, 7/9/2012); Breast surgery; joint replacement; Endoscopy, colon, diagnostic; Tonsillectomy; eye surgery; back surgery (1989); Humerus fracture surgery (Right, 7/20/2017); pr office/outpt visit,procedure only (Left, 1/20/2018); and Cholecystectomy, laparoscopic (N/A, 5/14/2019).     Sedation/ Anesthesia Plan:       Patient is an appropriate candidate for planned procedure: yes      Preoperative Diagnosis:                                 1- BILATERAL SACRAL ALA FRACTURES                                2-OSTEOPOROSIS                                3-INTRACTABLE PAIN                                4-DEBILITY Postoperative Diagnosis:                                              1)  AS ABOVE      Procedure Performed:     BILATERAL SACROPLASTY WITH BIOPSY    Indication for the Procedure: Patient developed back pain and xray/ MRI/  CT scans were consistant with insufficiency fractures of bilateral sacral ala fractures  The patient's pain is not responding well to conservative treatment including bed rest, activity modification and medications. Patient is in pain and not able to do PT well. As conservative measures failed, we decided to proceed with vertebral augmentation for the pain relief. The procedure and risks were discussed with patient and an informed consent was obtained. Procedure:   Patient was given 2 grams of Ancef Clindamycin IV prior to the procedure for antibiotic prophylaxis. Patient was given general endotracheal anesthesia and then was placed on the Rosa Husk table with all pressure points well padded. To facilitate the procedure biplanar fluoroscopy was used and by adjusting the angles of fluoroscopy both AP and lateral views of corresponding vertebral body was optimized. Skin over the back was prepped with antiseptic solution and the procedure was done under strict aspetic precautions.            Starting point was FCI between the SI joint and a vertical line was joined for the medial borders of the sacral neural foramina. Skin and deep tissues up to the periosteum of pedicle was infilitrated with  10 ml of 0.5% Marcaine Mague Bullock Then using #11 blade a small skin incission was made. The working canula with trochar in place was inserted parallel to rhe image intensifier, and angled the device tip approximately 20 to 40 degrees cranially. Then it was tapped slowly through the posterior sacral cortex under constant fluoroscopic surveillance.   A lateral view of the sacrum was taken and adjusted the superoinferior angulation of the device and was advanced the level of the superior portion of the S1 Vertebral body. Returned to the AP view and the mediolateral angulation of the introducer was appropriate. Returned to lateral view and advanced Introducer to the level of the superior portion of the S1 vertebral body. Checked images and the anterior sacral cortex was not penetrated. The above step was preformed bilaterally and S2,S3,S4 vertebral body.     Then a bone biopsy canula was inserted through the canula and a bone biopsy was obtained. Then the Kyphon bone tamp was inserted through the working canula. This was done in similar fashion bilaterally and S2 level. Good placements were confirmed with fluoroscopy.     The bone tamps. Were the serially inflated, to reexpand the vertebral bodies and restore more normal anatomy. In the  Meantime polymethymethacrylate was mixed as per protocol and Kyphon fillers were filled with it. When the cement reached the doughy state, from the lateral view, I placed the bone filler device into the working cannula   and position the distal end to fill the center portion od the sacral ala. I injected cement into the sacrum using both the lateral and oblique AP views to monitor the process. There was no anterior extravasation as seen on the lateral view and oblique AP view. Intermittently I retracted the needle from distal to proximal while placing the cement along the longitudinal axis of the sacrum targeting the fracture line. The bone filler was removed and tamps placed to ensure the cement was hardened. I re-inserted the stylet into the cannula and remove the working cannulas from the patient and wound closed with 2-0 silk. The bone tamps were withdrawn, and bone cement was placed in both balloon cavities, using fluoroscopic guidance.     The following are the volumes of contrast used to inflate the bone tamps and the volume of bone cement injected:         Level  S1,S2,S3,S4       right---   ---7 Ml of Polymethyl methacrylate       left---  ----7 Ml of Polymethyl methacrylate               Once the cement had set and was seen to be in good position by fluoroscopy, the working canula and bone fillers were removed. Final hemostasis was secured by applying pressure. The incisions were closed with Steri-strips, followed by sterile dressings. The patient was then turned supine onto the bed and awakened from anesthesia. The patient was extubated in the operating room, and taken to the recovery room in stable condition. The patient tolerated surgery without any complications. Estimated Blood Loss:   5 ml. Sponge, needle, and instrument counts were correct at the end of the case.       Electronically signed by Francine Renteria MD on 3/11/21 at 7:00 PM EST

## 2021-03-13 NOTE — PROGRESS NOTES
Report called to [facility], [facility] Carmel Crawford faxed

## 2021-03-13 NOTE — PROGRESS NOTES
Patient was sitting in chair. Patient was given medications. Patient asked Danyelle Kent you just give me something to end it all? \" Patient states \"I don't want to get better I just want to die. \" Primary nurse notified.

## 2021-03-13 NOTE — PROGRESS NOTES
Mark Ariza covering for Dr. Perri Ahmadi  Daily Progress Note    Pt Name: Zenon Jaimes Record Number: [id number]  Date of Birth 10/3/1931   Today's Date: 3/13/2021    HD: # 5    CC: \"So much pain when I move\"    [address] Problems    Diagnosis Date Noted    Acute pain due to injury [G89.11]     Severe malnutrition ([facility] [E43] 03/08/2021     Class: Acute    Fall [W19. XXXA] 03/08/2021    Fall at home [A37. Carmela Quirk, B18.145] 03/06/2021    Closed head injury [S09.90XA] 03/06/2021    Scalp laceration, initial encounter [S01.01XA] 03/06/2021    Sacral fracture ([facility] [S32.10XA] 03/06/2021     PROCEDURES  3/11/21 - Bilateral sacroplasty with biopsy with Dr. Zehra Dewitt  Patient admitted under Trauma services to [address] [place] [address].     [place] [facility]              - PT/OT              - Up with assistance              - Fall risk precautions     Closed head injury              - SLP cog eval indicates need for 24 hour supervision, no driving, med assistance and assistance with financial decisions   - Limited stimulation brain injury guidelines              - Neuro checks              - Antiemetics and pain control    Parietal scalp laceration   - Stapled in ED, remove staples 3/19/21   - Local wound care   - Ice PRN   - Pain control   - Tdap updated in ED     Sacral fracture at S2 level              - Orthopedic spine consulted, recommended conservative management and signed off.   - Pain management was consulted 3/9/21, MRI lumbar spine was completed 3/10/21   - POD #2 S/p Bilateraly sacroplasty with biopsy with Dr. Michael Erazo              - Neuro checks   - PT/OT   - Pain control     History of Hypertension, CHF, CKD, and Neurogenic bladder              - Home blood pressure medications restarted & Norvasc added 3/9 by PCP              - Chronic sanches in place    - Bactrim was prescribed on 3/2 to 3/12 for report UTI    - UA notes only small leukocytes, otherwise asymptomatic, continue to hold ATB for now due to no evidence of improved outcomes in clinical research. - Repeat UA secondary to confusion 3/9    - Macrobid started 3/10    - Family medicine on board   - Home ferrous sulfate reordered              - Continue to monitor     Pain Management              - Tylenol & Ultram PRN   - Was given dose of Norco on 3/8 with side effect of increased confusion   - Pain management assisting with management     Prophylaxis: SCD's, Incentive Spirometry, Colace, Pepcid, Zofran, Lovenox     General diet    Regular Neurovascular Checks  PT/OT/SLP continue to treat     Planned Discharge to pending clinical course   - From [address] [place] [address], an Assisted Living facility - they are unable to offer level of care that she needs due to her injuries   - Precert to [facility] approved - potential discharge this afternoon      SUBJECTIVE  Patient seen on 7K this morning. She is resting quietly in bed. She has been up with therapies and nursing staff. She continues to have pain when working with therapies. She is tolerating a general diet. She is passing flatus and has had a bowel movement. Shaikh catheter remains in place. Potential discharge to [facility] this afternoon if facility is able to accept as she is stable from trauma perspective.     Wt Readings from Last 3 Encounters:   03/13/21 131 lb 6.4 oz (59.6 kg)   05/14/19 168 lb (76.2 kg)   05/06/19 180 lb 2 oz (81.7 kg)     Temp Readings from Last 3 Encounters:   03/13/21 98.6 °F (37 °C) (Oral)   05/18/19 98.1 °F (36.7 °C) (Oral)   06/07/18 98.4 °F (36.9 °C)     BP Readings from Last 3 Encounters:   03/13/21 (!) 162/64   03/11/21 135/61   12/15/20 (!) 182/71     Pulse Readings from Last 3 Encounters:   03/13/21 70   12/15/20 59   05/18/19 63     24 HR INTAKE/OUTPUT :     Intake/Output Summary (Last 24 hours) at 3/13/2021 0844  Last data filed at 3/13/2021 0432  Gross per 24 hour   Intake 460 ml Output 775 ml   Net -315 ml     DIET GENERAL;  Dietary Nutrition Supplements: Standard High Calorie Oral Supplement    OBJECTIVE  CURRENT VITALS BP (!) 162/64   Pulse 70   Temp 98.6 °F (37 °C) (Oral)   Resp 18   Ht 5' 11\" (1.803 m)   Wt 131 lb 6.4 oz (59.6 kg)   SpO2 95%   BMI 18.33 kg/m²     GENERAL: Awake, alert, NAD. Very hard of hearing. NEURO: PERRL. Moves extremities and follows commands. PULMONARY: Clear to auscultation bilaterally with normal work of breathing. CARDIO: Strong/regular S1/S2 rate and rhythm. No rubs murmurs, or gallops. 2+ radial and dorsalis pedal pulses. Capillary refill <2 sec. No extremity edema noted. ABDOMEN: Soft, nontender, nondistended with normoactive bowel sounds. WOUND: Laceration to posterior scalp with staples in place. No bleeding or drainage noted. SKIN: Appropriate for ethnicity, warm and dry. EXTREMITIES: No cyanosis and no clubbing. LABS  CBC :   No results for input(s): WBC, HGB, HCT, MCV, PLT in the last 72 hours. BMP:   No results for input(s): NA, K, CL, CO2, BUN, CREATININE in the last 72 hours. COAGS:   No results for input(s): APTT, PROT, INR in the last 72 hours. Pancreas/HFP:  No results for input(s): LIPASE, AMYLASE in the last 72 hours. No results for input(s): AST, ALT, BILIDIR, BILITOT, ALKPHOS in the last 72 hours. RADIOLOGY  No new images    Electronically signed by DIGNA Stokes CNP on 3/13/2021 at 8:44 AM Patient seen and examined independently by me. Above discussed and I agree with CNP. Labs, cultures, and radiographs where available were reviewed. See orders for the updated patient care plan.     Romulo Castle MD, seeing patient for Dr. Darylene Kil trauma by fall with sacral fracture patient's lungs are clear abdomen is soft she is quite drowsy scalp laceration noted staples intact patient may be a week transferred this afternoon will await final decision from [facility]  3/13/2021   3:41 PM

## 2021-03-13 NOTE — FLOWSHEET NOTE
Pt is in bed. Stated she does not hear. She is 80years of age and is down in her spirit, wishing she could die. Prayer was had, but pt kept saying \"I can't hear you. \"  I know you were praying\". Nurse stated she Is leaving [facility] to go to a nursing home. 03/13/21 7789   Encounter Summary   Services provided to: Patient   Referral/Consult From: [facility]   Continue Visiting No  (3/13 going to nursing home)   Complexity of Encounter Low   Length of Encounter 15 minutes   Routine   Type Follow up   Care Plan:  Continue spiritual and emotional care for patient and family. Including prayers.

## 2021-03-13 NOTE — PROGRESS NOTES
Moved patient to chair with assistance. Patient incontinent of stool. Patient cleaned up. Patient states \"I thought you were supposed to die before going to hell. \" Patient stated pain of 10.

## 2021-03-13 NOTE — PROGRESS NOTES
Progress Note  Date:3/12/2021       Room:[address] [place], [place] [id number]  Patient Bob Tucker     YOB: 1931     Age:89 y.o. Subjective    Subjective:  Symptoms:  Stable. No shortness of breath or chest pain. Activity level: Impaired due to weakness. Pain:  She complains of pain that is mild. Review of Systems   Constitutional: Negative for activity change. HENT: Negative for congestion. Respiratory: Negative for chest tightness and shortness of breath. Cardiovascular: Negative for chest pain, palpitations and leg swelling. Gastrointestinal: Negative for abdominal distention. Genitourinary: Negative for difficulty urinating. Musculoskeletal: Positive for back pain. Negative for arthralgias. Skin: Negative for rash. Neurological: Negative for seizures and numbness. Psychiatric/Behavioral: Positive for confusion. Objective         Vitals Last 24 Hours:  TEMPERATURE:  Temp  Av °F (36.7 °C)  Min: 97.7 °F (36.5 °C)  Max: 98.5 °F (36.9 °C)  RESPIRATIONS RANGE: Resp  Av.5  Min: 16  Max: 18  PULSE OXIMETRY RANGE: SpO2  Av.3 %  Min: 92 %  Max: 98 %  PULSE RANGE: Pulse  Av  Min: 59  Max: 77  BLOOD PRESSURE RANGE: Systolic (44ZSM), AQL:440 , Min:115 , LHW:082   ; Diastolic (25JER), USC:62, Min:56, Max:84    I/O (24Hr): Intake/Output Summary (Last 24 hours) at 3/12/2021 2213  Last data filed at 3/12/2021 2106  Gross per 24 hour   Intake 560 ml   Output 850 ml   Net -290 ml     Objective:  General Appearance:  Comfortable. Vital signs: (most recent): Blood pressure (!) 174/76, pulse 67, temperature 98.5 °F (36.9 °C), temperature source Oral, resp. rate 18, height 5' 11\" (1.803 m), weight 136 lb 11.2 oz (62 kg), SpO2 95 %, not currently breastfeeding. Vital signs are normal.    Output: Producing urine and producing stool. HEENT: Normal HEENT exam.    Lungs:  Normal effort and normal respiratory rate. Breath sounds clear to auscultation.     Heart: Normal rate.  Regular rhythm. S1 normal and S2 normal.  Positive for murmur (1/6 brian). Abdomen: Abdomen is soft. Bowel sounds are normal.   There is no abdominal tenderness. Extremities: Normal range of motion. (Low  Back pain  better)  Neurological: Patient is alert and oriented to person, place and time. Skin:  Warm. Labs/Imaging/Diagnostics    Labs:  CBC:No results for input(s): WBC, RBC, HGB, HCT, MCV, RDW, PLT in the last 72 hours. CHEMISTRIES:No results for input(s): NA, K, CL, CO2, BUN, CREATININE, GLUCOSE, PHOS, MG in the last 72 hours. Invalid input(s): CA  PT/INR:No results for input(s): PROTIME, INR in the last 72 hours. APTT:No results for input(s): APTT in the last 72 hours. LIVER PROFILE:No results for input(s): AST, ALT, BILIDIR, BILITOT, ALKPHOS in the last 72 hours. Imaging Last 24 Hours:  Fluoro For Surgical Procedures    Result Date: 3/11/2021  Radiology exam is complete. No Radiologist dictation. Please follow up with ordering provider. Assessment//Plan           Hospital Problems           Last Modified POA    Fall at home 3/10/2021 Yes    Closed head injury 3/6/2021 Yes    Scalp laceration, initial encounter 3/6/2021 Yes    Sacral fracture ([address] 3/6/2021 Yes    Severe malnutrition ([address] 3/8/2021 Yes    Fall 3/8/2021 Yes    Acute pain due to injury 3/9/2021 Yes        Assessment:    Condition: In stable condition. Improving. (Sacral fracture and appears better      htn better and adjust  bp  Med     bradycardia stable      confusion notes and better      bacteria after sanches cath placed      mild aortic stenosis noted     Confusion  Noted and dose of seroquel but severe hard of hearing ). Plan:   Per physical therapy. Consults: physical therapy and occupational therapy. Advance diet as tolerated. Administer medications as ordered.    ( Will try seroquel just  At  Night      bp better      stable for nursing home and had telesitter last pm      continue with therapies      to go to [address] [place] [address] ).        Electronically signed by Yamel Ramirez MD on 3/12/21 at 10:13 PM EST

## 2021-03-14 PROBLEM — Z97.8 CHRONIC INDWELLING FOLEY CATHETER: Status: ACTIVE | Noted: 2021-01-01

## 2021-03-14 PROBLEM — H91.93 BILATERAL HEARING LOSS: Status: ACTIVE | Noted: 2021-01-01
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
## 2021-03-15 NOTE — CARE COORDINATION
3/15/21, 12:44 PM EDT    Patient goals/plan/ treatment preferences discussed by  and . Patient goals/plan/ treatment preferences reviewed with patient/ family. Patient/ family verbalize understanding of discharge plan and are in agreement with goal/plan/treatment preferences. Understanding was demonstrated using the teach back method. AVS provided by RN at time of discharge, which includes all necessary medical information pertaining to the patients current course of illness, treatment, post-discharge goals of care, and treatment preferences.     Services After Discharge  Services At/After Discharge: Nursing Services, Skilled Therapy, In ambulance, Aide Services(Marla Boyer)   IMM Letter  IMM Letter given to Patient/Family/Significant other/Guardian/POA/by[de-identified] cm  IMM Letter date given[de-identified] 03/12/21  IMM Letter time given[de-identified] 0740  Observation Status Letter date given[de-identified] 03/08/21  Observation Status Letter time given[de-identified] 200  Observation Status Letter given to Patient/Family/Significant other/Guardian/POA/by[de-identified]

## 2021-03-17 PROBLEM — M84.48XA SACRAL INSUFFICIENCY FRACTURE: Status: ACTIVE | Noted: 2021-01-01

## 2021-03-24 NOTE — DISCHARGE SUMMARY
Discharge Summary   Dr. Perri Ahmadi    Patient Identification:  Claudia Green  : 10/3/1931  MRN: [id number]   Account: [de-identified]     Admit date: 3/5/2021  Discharge date: 21   Attending provider: No att. providers found        Primary care provider: Nigel Gonzalez MD     Discharge Diagnoses: Active Problems:    Fall at home    Closed head injury    Scalp laceration, initial encounter    Sacral insufficiency fracture    Severe malnutrition ([address]    Fall    Acute pain due to injury    Intractable pain    Debility  Resolved Problems:    * No resolved hospital problems. [facility] Course: Claudia Green is a 80 y.o. female admitted to [address] [place] [address] on 3/5/2021 for treatment of injuries sustained in a fall. She reported that she was going to the bathroom to empty her sanches catheter when she fell. She was not exactly sure of the mechanism of the fall. She had baseline confusion prior to her admission to the hospital.  There was no loss of consciousness during the fall. She sustained a sacral fracture at the S2 level for which orthopedic spine was consulted. She was initially treated conservatively, however she did not tolerate physical or occupational therapies. Pain management was consulted. An MRI of the lumbosacral spine was obtained. Due to the findings on the MRI and the patient's severe pain and limited mobility, a bilateral sacroplasty was performed by Dr. Michael Erazo on 21. Other pain control techniques employed were oral analgesics. Rainell Antrim was attempted to be utilized, however this medication increased Kyleigh's confusion and was discontinued. She tolerated only Ultram, Lidoderm and Tylenol. Her pain did slightly improve after the sacroplasty and she was able to tolerate sitting in the chair for short periods of time.   Due to her increased need for therapies, she was not able to return to the Assisted living that she had been residing at prior to her admission to the hospital.  Arrangements were made for her to go to the skilled side of the facility where she was staying. Once the insurance approval was received, she was discharged in stable condition. Discharge Medications:   Elias Matt   Home Medication Instructions PVM:[id number]    Printed on:03/23/21 2221   Medication Information                      acetaminophen (APAP EXTRA STRENGTH) 500 MG tablet  Take 2 tablets by mouth every 6 hours as needed for Pain             amLODIPine (NORVASC) 2.5 MG tablet  Take 1 tablet by mouth daily             cloNIDine (CATAPRES) 0.1 MG tablet  Take 1 tablet by mouth every 8 hours as needed for High Blood Pressure             docusate sodium (COLACE, DULCOLAX) 100 MG CAPS  Take 100 mg by mouth 2 times daily             donepezil (ARICEPT) 5 MG tablet  Take 1 tablet by mouth Daily with supper             ferrous sulfate (FE TABS) 325 (65 Fe) MG EC tablet  Take 1 tablet by mouth 2 times daily             hydrALAZINE (APRESOLINE) 25 MG tablet  Take 3 tablets by mouth every 8 hours             lidocaine 4 % external patch  Place 3 patches onto the skin daily             metoprolol tartrate (LOPRESSOR) 25 MG tablet  Take 1 tablet by mouth 2 times daily             nitrofurantoin, macrocrystal-monohydrate, (MACROBID) 100 MG capsule  Take 1 capsule by mouth every 12 hours for 10 days             polyethylene glycol (GLYCOLAX) 17 g packet  Take 17 g by mouth daily as needed for Constipation             QUEtiapine (SEROQUEL) 25 MG tablet  Take 1 tablet by mouth 2 times daily             senna (SENOKOT) 8.6 MG tablet  Take 2 tablets by mouth nightly             traZODone (DESYREL) 50 MG tablet  Take 25 mg by mouth nightly                 Patient Instructions:     Activity: Up with therapies as tolerated  Diet: No diet orders on file    Code Status: Prior    Follow-up visits:   [facility]  [facility] [id number] [phone] Zaire Wu MD  [address]  [phone]    Schedule an appointment as soon as possible for a visit         Procedures: Bilateral sacroplasty on 03/11/21    Consults:   Orthopedic spine, pain management    Examination:  Vitals:  Vitals:    03/13/21 0423 03/13/21 0600 03/13/21 0715 03/13/21 1706   BP: (!) 142/66  (!) 162/64 (!) 158/68   Pulse: 69  70    Resp: 16  18    Temp:   98.6 °F (37 °C)    TempSrc:   Oral    SpO2: 94%  95%    Weight:  131 lb 6.4 oz (59.6 kg)     Height:         Weight: Weight: 131 lb 6.4 oz (59.6 kg)       24 HR INTAKE/OUTPUT :      Intake/Output Summary (Last 24 hours) at 3/13/2021 0844  Last data filed at 3/13/2021 0432      Gross per 24 hour   Intake 460 ml   Output 775 ml   Net -315 ml      DIET GENERAL;  Dietary Nutrition Supplements: Standard High Calorie Oral Supplement        Significant Diagnostics:   Radiology: No results found. LABS  CBC :   No results for input(s): WBC, HGB, HCT, MCV, PLT in the last 72 hours. BMP:   No results for input(s): NA, K, CL, CO2, BUN, CREATININE in the last 72 hours. COAGS:   No results for input(s): APTT, PROT, INR in the last 72 hours. Pancreas/HFP:  No results for input(s): LIPASE, AMYLASE in the last 72 hours. No results for input(s): AST, ALT, BILIDIR, BILITOT, ALKPHOS in the last 72 hours. Discharge condition: stable  [facility]  [facility]  Time spent on discharge: >35 minutes    Electronically signed by DIGNA Huerta CNP on 3/23/2021 at 10:21 PM

## 2021-03-25 PROBLEM — A41.9 SEPSIS (HCC): Status: ACTIVE | Noted: 2021-01-01

## 2021-03-25 NOTE — ED PROVIDER NOTES
[facility] [address] COMPLAINT       Chief Complaint   Patient presents with    Fatigue    Emesis       Nurses Notes reviewed and I agree except as notedin the HPI. HISTORY OF PRESENT ILLNESS    Fabricio Lyles is a 80 y.o. female who presents was sent in because of nausea vomiting last 24 hours. The patient fell and broke her sacrum on the fifth. She is been on Ultram and [place] at the [facility]. She is also been getting Lidoderm patches. She is here because of nausea vomiting. She is confused at her baseline state. No urinary symptoms. Location/Symptom: [place]   Timing/Onset: 24 hours  Context/Setting: home  Quality: none  Duration: off and on  Modifying Factors: none  Severity: none    REVIEW OF SYSTEMS     Review of Systems   Constitutional: Negative for activity change, appetite change, chills and fever. HENT: Negative for congestion, ear pain, rhinorrhea and sore throat. Eyes: Negative for pain, discharge and itching. Respiratory: Negative for cough, shortness of breath and wheezing. Cardiovascular: Negative for chest pain. Gastrointestinal: Positive for nausea and vomiting. Negative for abdominal pain and diarrhea. Genitourinary: Negative for difficulty urinating and dysuria. Musculoskeletal: Positive for back pain. Negative for arthralgias and myalgias. Skin: Negative for color change and rash. Neurological: Negative for dizziness, seizures, light-headedness and headaches. Psychiatric/Behavioral: Negative for agitation, confusion, self-injury and suicidal ideas. All other systems reviewed and are negative.        PAST MEDICAL HISTORY    has a past medical history of Arthritis, Bilateral carotid artery disease ([address] Breast CA ([address] Chronic diastolic CHF (congestive heart failure) ([address] Chronic kidney disease, Colon, diverticulosis, Dementia ([address] GERD (gastroesophageal reflux disease), Hiatal hernia with gastroesophageal reflux, Hypertension, Insomnia, LBBB (left bundle branch block), Mood disorder ([address] Movement disorder, Neurogenic bladder, Osteoporosis, and Pneumonia. SURGICAL HISTORY      has a past surgical history that includes Mastectomy (right 1987; left 1991); Lumbar disc arthroplasty (1989; 1998); Total hip arthroplasty (right 1994; left 1996); Total knee arthroplasty (left and right 1998); Cataract removal (2011); Colonoscopy (11/13); Upper gastrointestinal endoscopy (2013); laminectomy (7/7/2012); shoulder surgery (Left, 7/9/2012); Breast surgery; joint replacement; Endoscopy, colon, diagnostic; Tonsillectomy; eye surgery; back surgery (1989); Humerus fracture surgery (Right, 7/20/2017); pr office/outpt visit,procedure only (Left, 1/20/2018); Cholecystectomy, laparoscopic (N/A, 5/14/2019); and Spine surgery (N/A, 3/11/2021).     CURRENT MEDICATIONS       Previous Medications    ACETAMINOPHEN (APAP EXTRA STRENGTH) 500 MG TABLET    Take 2 tablets by mouth every 6 hours as needed for Pain    AMLODIPINE (NORVASC) 2.5 MG TABLET    Take 1 tablet by mouth daily    CLONIDINE (CATAPRES) 0.1 MG TABLET    Take 1 tablet by mouth every 8 hours as needed for High Blood Pressure    DOCUSATE SODIUM (COLACE, DULCOLAX) 100 MG CAPS    Take 100 mg by mouth 2 times daily    DONEPEZIL (ARICEPT) 5 MG TABLET    Take 1 tablet by mouth Daily with supper    FERROUS SULFATE (FE TABS) 325 (65 FE) MG EC TABLET    Take 1 tablet by mouth 2 times daily    HYDRALAZINE (APRESOLINE) 25 MG TABLET    Take 3 tablets by mouth every 8 hours    LIDOCAINE 4 % EXTERNAL PATCH    Place 3 patches onto the skin daily    METOPROLOL TARTRATE (LOPRESSOR) 25 MG TABLET    Take 1 tablet by mouth 2 times daily    POLYETHYLENE GLYCOL (GLYCOLAX) 17 G PACKET    Take 17 g by mouth daily as needed for Constipation    QUETIAPINE (SEROQUEL) 25 MG TABLET    Take 1 tablet by mouth 2 times daily    SENNA (SENOKOT) 8.6 MG TABLET    Take 2 tablets by mouth nightly    TRAZODONE (DESYREL) 50 MG TABLET    Take 25 mg by mouth nightly       ALLERGIES     is allergic to morphine. HISTORY     She indicated that her mother is . She indicated that her father is . She indicated that her sister is . She indicated that her brother is . She indicated that one of her two daughters is . She indicated that her son is . family history includes Cancer in her brother, daughter, father, and son; Karlis Primer in her daughter; Heart Disease in her mother and sister. SOCIALHISTORY      reports that she quit smoking about 38 years ago. Her smoking use included cigarettes. She has a 80.00 pack-year smoking history. She has never used smokeless tobacco. She reports that she does not drink alcohol or use drugs. PHYSICAL EXAM     INITIAL VITALS:  weight is 140 lb (63.5 kg). Her oral temperature is 97.9 °F (36.6 °C). Her blood pressure is 155/66 (abnormal) and her pulse is 84. Her respiration is 25 and oxygen saturation is 91%. Physical Exam  Vitals signs and nursing note reviewed. Constitutional:       Comments: Well Developed Well Nourished Appearing     HENT:      Head: Normocephalic and atraumatic. Right Ear: Tympanic membrane normal.      Left Ear: Tympanic membrane normal.   Eyes:      Pupils: Pupils are equal, round, and reactive to light. Neck:      Musculoskeletal: Normal range of motion and neck supple. Cardiovascular:      Rate and Rhythm: Normal rate and regular rhythm. Heart sounds: Normal heart sounds. Pulmonary:      Effort: Pulmonary effort is normal. No respiratory distress. Breath sounds: Normal breath sounds. No stridor. No wheezing or rhonchi. Abdominal:      General: Bowel sounds are normal. There is no distension. Palpations: Abdomen is soft. Musculoskeletal:      Comments: Tenderness paraspinous muscles lumbar spine. Good strength and sensation lower extremities.          DIFFERENTIAL DIAGNOSIS:   Low back pain. Nausea vomiting. Could be medication related. She has a benign abdominal exam but hypoactive bowel sounds we will get a CT scan to exclude any bowel obstruction. Could also be viral etiology. DIAGNOSTIC RESULTS     EKG: All EKG's are interpreted by the Emergency Department Physician who either signs or Co-signs this chart in the absence of a cardiologist.     EKG Emergency (Preliminary result)   Component (Lab Inquiry)  Collection Time Result Time Ventricular Rate Atrial Rate P-R Interval QRS Duration Q-T Interval   03/25/21 08:45:08 03/25/21 08:46:09 80 80 236 140 452       Collection Time Result Time QTc Calculation (Bazett) P Axis R Axis T Axis   03/25/21 08:45:08 03/25/21 08:46:09 521 83 0 106         Preliminary result                Narrative:    Sinus rhythm with 1st degree A-V block   Left bundle branch block   Abnormal ECG   When compared with ECG of 10-MAR-2021 16:32,   Ventricular rate has increased BY  26 BPM   Nonspecific T wave abnormality no longer evident in Inferior leads                      RADIOLOGY: non-plain film images(s) such as CT, Ultrasound and MRI are read by the radiologist.  CT scan of abdomen pelvis read per radiology     XR CHEST PORTABLE (Final result)  Result time 03/25/21 11:35:22  Final result by Mae Rojo MD (03/25/21 11:35:22)                Impression:    1. Moderate cardiomegaly and increased pulmonary vascularity. 2. Abnormal density left retrocardiac region suggestive of infiltrate and possible effusion. 3. Atherosclerotic calcification mild dilatation of the ascending aorta. 4. Postoperative changes involving the left shoulder, lower thoracic and lumbar spine. .           **This report has been created using voice recognition software. It may contain minor errors which are inherent in voice recognition technology. **     Final report electronically signed by DR Arsen Oconnell on 3/25/2021 11:35 AM            Narrative:    PROCEDURE: XR CHEST PORTABLE CLINICAL INFORMATION: SOB.     COMPARISON: Chest x-ray dated 7 June 2018. TECHNIQUE: AP upright view of the chest.     FINDINGS:       There is moderate cardiomegaly. There is atherosclerotic calcification aortic arch.  There is abnormal density in the left retrocardiac region suggestive of infiltrate and possible effusion. . The pulmonary vascularity is slightly increased. There are postoperative changes in the lower thoracic and upper lumbar spine and left shoulder. .                     CT ABDOMEN PELVIS WO CONTRAST Additional Contrast? None (Final result)  Result time 03/25/21 09:00:08  Final result by Virginia Walter MD (03/25/21 09:00:08)                Impression:    1. Bilateral moderate pleural effusions and adjacent atelectasis. 2. Cardiomegaly   3. Fecal impaction. 4. Severe diverticulosis with no convincing evidence for diverticulitis but difficult to exclude based on this exam.         **This report has been created using voice recognition software.  It may contain minor errors which are inherent in voice recognition technology. **     Final report electronically signed by Dr. Rosa Kamara on 3/25/2021 9:00 AM            Narrative:    PROCEDURE: CT ABDOMEN PELVIS WO CONTRAST     CLINICAL INFORMATION: abdominal pain . COMPARISON: 5/13/2019     TECHNIQUE: 2-D multiplanar noncontrast images of the abdomen and pelvis   All CT scans at this facility use dose modulation, iterative reconstruction, and/or weight-based dosing when appropriate to reduce radiation dose to as low as reasonably achievable. FINDINGS: Limitations:   Solid organ and hollow viscera evaluation limited due to lack of contrast.   Lung bases   Bilateral pleural effusions. Bibasilar compressive atelectasis from the pleural effusions. Heart size is enlarged. Marked mitral annular calcifications. Coronary artery calcific atherosclerosis. Abdomen and pelvis liver and spleen are within normal limits.  Extensive streak artifact due to spinal hardware is present. The pancreas cannot be accurately assessed on this exam appears to be atrophic and I do not identify a large mass. Right kidney is unremarkable. 6 cm cyst upper pole left kidney this is increased in size since the prior exam. 2 additional cysts left kidney 4 cm and 2 cm. Punctate calcification upper pole calyx and vascular calcifications noted left kidney. Heavy calcific atherosclerosis of the aorta   Air and fluid-filled loops of small bowel are present. No dilated loops are demonstrated. There is severe colonic diverticulosis no convincing evidence of acute diverticulitis. Evaluation of the pelvis is further limited due to bilateral hip replacements   A large fecal bolus in the rectum suggesting fecal impaction. Bladder cannot be assessed is completely obscured by beam hardening artifacts. The bones   Uterus spinal hardware from L1 through L3. Prior sacral plasty. An the previously mentioned bilateral hip replacement. Bones are severely osteopenic. A do not identify any suspicious bone lesions.                    LABS:   Labs Reviewed   CBC WITH AUTO DIFFERENTIAL - Abnormal; Notable for the following components:       Result Value    WBC 16.8 (*)     RBC 3.97 (*)     MCHC 32.1 (*)     RDW-CV 15.3 (*)     RDW-SD 53.6 (*)     MPV 9.2 (*)     Segs Absolute 14.7 (*)     Immature Grans (Abs) 0.15 (*)     All other components within normal limits   TROPONIN - Abnormal; Notable for the following components:    Troponin T 0.019 (*)     All other components within normal limits   URINE RT REFLEX TO CULTURE - Abnormal; Notable for the following components:    Bilirubin Urine SMALL (*)     Ketones, Urine 40 (*)     All other components within normal limits   OSMOLALITY - Abnormal; Notable for the following components:    Osmolality Calc 273.0 (*)     All other components within normal limits   TROPONIN - Abnormal; Notable for the following components:    Troponin T 0.014 (*)     All other components within normal limits   HEPATIC FUNCTION PANEL - Abnormal; Notable for the following components:    Albumin 2.6 (*)     ALT 10 (*)     All other components within normal limits   BASIC METABOLIC PANEL   MAGNESIUM   ANION GAP   GLOMERULAR FILTRATION RATE, ESTIMATED   BILE ACIDS, TOTAL   LACTATE, SEPSIS   TROPONIN       EMERGENCY DEPARTMENT COURSE:   :    Vitals:    03/25/21 1315 03/25/21 1445 03/25/21 1530 03/25/21 1605   BP: (!) 152/66 (!) 139/99 (!) 155/66    Pulse: 86 85 84    Resp: 23 22 25    Temp:       TempSrc:       SpO2: 92% 94% 96% 91%   Weight:         Patient was seen history physical exam was performed. Patient given Percocet and Zofran as well as IV fluids. Patient's pain is improved and nausea vomiting improved. Her troponin is elevated serum is elevated in the past.  Will admit. See disposition below    CRITICAL CARE:  None    CONSULTS:  None    PROCEDURES:  None    FINAL IMPRESSION      1. Nausea and vomiting, intractability of vomiting not specified, unspecified vomiting type    2. Pleural effusion    3. Hypoxia    4. Elevated troponin          DISPOSITION/PLAN   Discharge    PATIENT REFERRED TO:  No follow-up provider specified.     DISCHARGE MEDICATIONS:  New Prescriptions    No medications on file       (Please note that portions of this note were completed with a voice recognitionprogram.  Efforts were made to edit the dictations but occasionally words are mis-transcribed.)    KASIE CisnerosWomen [facility] [address], [address]  03/25/21 [id number]

## 2021-03-25 NOTE — PROGRESS NOTES
Pt admitted to  3B24 via cart/stretcher. Complaints: None. IV normal saline infusing into the antecubital right, condition patent and no redness at a rate of 50 mls/ hour with about 300 mls in the bag still. IV site free of s/s of infection or infiltration. Vital signs obtained. Assessment and data collection initiated. Two nurse skin assessment performed by Casey Chapin and Yovani Orlando RN. Oriented to room. Policies and procedures for 3B explained. Ree Chahal RN discussed hourly rounding with patient addressing 5 P's. Fall prevention and safety brochure discussed with patient. Bed alarm on. Call light in reach. The best day to schedule a follow up Dr appointment is:  Tuesday p.m. Explained patients right to have family, representative or physician notified of their admission. Patient has Declined for physician to be notified. Patient has Declined for family/representative to be notified. All questions answered with no further questions at this time.

## 2021-03-25 NOTE — ED NOTES
ED to inpatient nurses report    Chief Complaint   Patient presents with    Fatigue    Emesis      Present to ED from nursing home  LOC: alert and orientated to name, place, date intermittent confusion  Vital signs   Vitals:    03/25/21 1015 03/25/21 1100 03/25/21 1145 03/25/21 1315   BP: (!) 146/61 (!) 166/79 (!) 147/64 (!) 152/66   Pulse: 71 74 87 86   Resp: 19 18 27 23   Temp:       TempSrc:       SpO2: 92% 93% 93% 92%   Weight:          Oxygen Baseline room air    Current needs required room air Bipap/Cpap No  LDAs:   Peripheral IV 03/25/21 Right Antecubital (Active)     Mobility: Requires assistance * 1  Pending ED orders: none  Present condition: stable  Person of contract in chart, phone number in chart  Our promise was given to patient    Electronically signed by Ebonie Dietrich RN on 3/25/2021 at 1:51 PM       Ramona Bingham RN  03/25/21 [phone]

## 2021-03-25 NOTE — H&P
Internal Medicine Specialties  H&P  3/25/2021  1:30 PM    Patient:  Zohaib Kowalski  YOB: 1931    MRN: [id number]   Acct:  [de-identified]   02/002A  Primary Care Physician: Ronan Moreno MD    Chief Complaint:  Chief Complaint   Patient presents with    Fatigue    Emesis       History of Present Illness: The patient is a 80 y.o. female with pmhx of CAD, Hypertension, numerous orthopedic surgeries and dementia who presents with nausea and vomiting for the past 24 hours. Patient resides at [facility] and they called the family this morning stating that she had been vomiting through the night and had not eaten anything since yesterday afternoon. Patient is confused but this is baseline for her per family. She was recently admitted to [facility] for surgery due to a fracture of the sacrum. In the emergency department troponin was elevated to 0.019, WBC elevated to 16.9, patient was hypoxic at 90% and RR between 30 and 40. UA was negative for infection but positive for ketones and bilirubin. Chest X Ray showed bilateral pleural effusions. CT abdomen confirmed bilateral pleural effusions, fecal impaction and severe diverticulosis with no convincing evidence for diverticulitis. She denies chest pain or abdominal pain. She continues to be nauseous and have no appetite but no more emesis since coming to the ED. She meets sepsis criteria, possibly due to aspiration pneumonia. Patient admitted for sepsis.        Past Medical History:        Diagnosis Date    Arthritis     Bilateral carotid artery disease ([address] 2012    Breast CA ([address]     Chronic diastolic CHF (congestive heart failure) ([address] 07/20/2017    Chronic kidney disease     Colon, diverticulosis 2013    Dementia ([address]     GERD (gastroesophageal reflux disease)     Hiatal hernia with gastroesophageal reflux 2013    Hypertension 2009    Insomnia     LBBB (left bundle branch block) 2004    Mood disorder ([address]     Movement disorder     severe colonic diverticulosis no convincing evidence of acute diverticulitis. Evaluation of the pelvis is further limited due to bilateral hip replacements A large fecal bolus in the rectum suggesting fecal impaction. Bladder cannot be assessed is completely obscured by beam hardening artifacts. The bones Uterus spinal hardware from L1 through L3. Prior sacral plasty. An the previously mentioned bilateral hip replacement. Bones are severely osteopenic. A do not identify any suspicious bone lesions. 1. Bilateral moderate pleural effusions and adjacent atelectasis. 2. Cardiomegaly 3. Fecal impaction. 4. Severe diverticulosis with no convincing evidence for diverticulitis but difficult to exclude based on this exam. **This report has been created using voice recognition software. It may contain minor errors which are inherent in voice recognition technology. ** Final report electronically signed by Dr. Aurora Sweet on 3/25/2021 9:00 AM    Ct Head Wo Contrast    Result Date: 3/5/2021  CT head without contrast Comparison:  CT  - CT HEAD WO CONTRAST  - 07/19/2017 05:00 PM EDT Findings: Involutional change and nonspecific white matter hypodensity. Coarse subcentimeter calcification associated with the tentorium on the right, possibly representing a small meningioma. No suspicious mass. No midline shift, hydrocephalus, or acute hemorrhage. Orbits, and mastoid air cells are unremarkable. Near complete opacification of the left sphenoid sinus No skull fracture     Impression: 1. No acute findings This document has been electronically signed by: Norm Cordova MD on 03/05/2021 10:38 PM All CTs at this facility use dose modulation techniques and iterative reconstructions, and/or weight-based dosing when appropriate to reduce radiation to a low as reasonably achievable.     Ct Cervical Spine Wo Contrast    Result Date: 3/5/2021  CT cervical spine without contrast Comparison:  CT  - CT CERVICAL SPINE WO CONTRAST  - 07/19/2017 05:00 PM EDT Findings: No acute findings on limited view of the intracranial contents. No cervical fluid collections or masses. No consolidation or effusion at the lung apices. Normal vertebral body alignment. No acute fractures or dislocations. There are degenerative changes. There is osteopenia. There is a circumscribed focus of sclerosis within the C7 vertebral body, likely a bone island. No evidence of cervical spine fracture. This document has been electronically signed by: Aniyah Mckeon MD on 03/05/2021 10:42 PM All CTs at this facility use dose modulation techniques and iterative reconstructions, and/or weight-based dosing when appropriate to reduce radiation to a low as reasonably achievable. Ct Thoracic Spine Wo Contrast    Result Date: 3/5/2021  CT thoracic spine without contrast Comparison: None Findings: Vertebral alignment is within normal limits. No significant degenerative change. Partial visualization of the thoracolumbar spine fusion rods extending inferiorly from the T10 level. No gross evidence of hardware failure. Hemangioma at the T7 level. Visualized lungs and mediastinum are unremarkable. Mild cardiomegaly. Mitral annular calcifications. Coronary artery calcifications. 1. No acute thoracic spine fracture. 2. Mild osteoporotic type fractures of the T3, T4 and T5 vertebral bodies. Moderate chronic appearing compression fracture at L1. This document has been electronically signed by: Aniyah Mckeon MD on 03/05/2021 10:49 PM All CTs at this facility use dose modulation techniques and iterative reconstructions, and/or weight-based dosing when appropriate to reduce radiation to a low as reasonably achievable. Ct Lumbar Spine Wo Contrast    Result Date: 3/5/2021  CT lumbar spine without contrast Comparison: None Findings: Trace anterolisthesis of L3 on L4. Partial visualization of thoracolumbar spine fusion rods extending to the L2 level.  Status post laminectomy extending from L3-L5. Moderate chronic appearing compression fracture at L1. Multilevel degenerative disc disease and facet osteoarthritis with central canal and neural foraminal stenosis at multiple levels. Osteopenia. No acute abnormality of visualized abdominal contents. 1.  No acute lumbar spine fracture. 2.  Apparent cortical step-off involving the anterior cortex of S2 raising the possibility of a sacral fracture. 3.  Chronic compression fracture at L1. Postsurgical changes of the thoracolumbar spine. This document has been electronically signed by: Moni Neville MD on 03/05/2021 10:57 PM All CTs at this facility use dose modulation techniques and iterative reconstructions, and/or weight-based dosing when appropriate to reduce radiation to a low as reasonably achievable. Ct Pelvis Wo Contrast Additional Contrast? None    Result Date: 3/5/2021  CT pelvis without contrast Comparison: None Findings: Chronic appearing fracture of the right inferior pubic ramus. Mild cortical step-off involving the anterior cortex of the S2 vertebral segment. No dislocation. Status post bilateral hip replacement. Osteopenia. Postsurgical changes of the lumbar spine. Infiltration of subcutaneous fat within the inferomedial aspect of the left buttock, likely secondary to scarring. No fluid collection. No radiopaque foreign body. No acute abnormality of pelvic contents. Colonic diverticulosis. Moderate fecal retention within the colon. Shaikh catheter within the urinary bladder. Infiltration of presacral soft tissues. Possible sacral fracture at the S2 level. This document has been electronically signed by: Moni Neville MD on 03/05/2021 11:03 PM All CTs at this facility use dose modulation techniques and iterative reconstructions, and/or weight-based dosing when appropriate to reduce radiation to a low as reasonably achievable.     Mri Lumbar Spine Wo Contrast    Result Date: 3/10/2021  PROCEDURE: MRI LUMBAR SPINE WO change involving the sacroiliac joints bilaterally. There are multiple left renal cysts present. .      1. Old compression fracture involving the L1 vertebral body and status post instrumentation between approximately T10 and L2. 2. Postoperative changes at L3-4 and L4-5. 3. Abnormal signal intensity in S2, S3 and S4 sacral segments consistent with bone marrow edema suspicious for recent fractures. 4. Degenerative changes resulting in moderate canal and moderate to severe bilateral foraminal stenosis and L5-S1. 5. There is mild to moderate canal  and moderate to severe bilateral foraminal stenosis and L4-5. 6. There is mild to moderate   canal and moderate bilateral foraminal stenosis at L2-3. 7. There is mild canal and mild to moderate bilateral foraminal stenosis and L1-2. 8. There is moderate bilateral foramen stenosis with no canal stenosis at L3-4. 9. There is degenerative change involving the sacroiliac joints bilaterally. 10. There are multiple left renal cysts present. **This report has been created using voice recognition software. It may contain minor errors which are inherent in voice recognition technology. ** Final report electronically signed by DR Belgica Owens on 3/10/2021 10:19 AM    Xr Chest Portable    Result Date: 3/25/2021  PROCEDURE: XR CHEST PORTABLE CLINICAL INFORMATION: SOB. COMPARISON: Chest x-ray dated 7 June 2018. TECHNIQUE: AP upright view of the chest. FINDINGS: There is moderate cardiomegaly. There is atherosclerotic calcification aortic arch. There is abnormal density in the left retrocardiac region suggestive of infiltrate and possible effusion. . The pulmonary vascularity is slightly increased. There are postoperative changes in the lower thoracic and upper lumbar spine and left shoulder. .     1. Moderate cardiomegaly and increased pulmonary vascularity. 2. Abnormal density left retrocardiac region suggestive of infiltrate and possible effusion.  3. Atherosclerotic calcification mild dilatation of the ascending aorta. 4. Postoperative changes involving the left shoulder, lower thoracic and lumbar spine. . **This report has been created using voice recognition software. It may contain minor errors which are inherent in voice recognition technology. ** Final report electronically signed by DR Gabi Jackson on 3/25/2021 11:35 AM    Fluoro For Surgical Procedures    Result Date: 3/11/2021  Radiology exam is complete. No Radiologist dictation. Please follow up with ordering provider. Assessment/Plan:    1. Sepsis due to suspected aspiration pneumonia  a. IV fluids  b. IV abx Zosyn  c. Lactic Acid ordered   d. Supplemental O2 PRN  e. Breathing treatments  f. Monitor CBC  2. Nausea/Vomiting  a. Zofran PRN  b. Aspiration precautions  c. GI prophylaxis with protonix  3. Elevated troponin  a. Cardiology consult  b. Trend troponin Q3h  c. Echocardiogram ordered   4. Hepatic function panel ordered due to bilirubin in urine  5. DVT prophylaxis  a. Lovenox     Assessment and plan of care discussed with supervising physician. Electronically signed by DIGNA Barry CNP on 3/25/2021 at 1:30 PM         Copy: Primary Care Physician: Lalit Velasquez MD     Addendum to above note  D/w Kellen Coyle  I have seen and examined the patient independently. Admitted for  vomiting and confusion  No CP   Could not tell when  her last BM was. Elevated WBC , CXR ? Infiltrate. Will cont antibiotics  Stool softner  PT OT  Monitor cardiac enzymes, cont B Blockers  GI and DVT prophylaxis  Careful hydration  Noted pleural effusion.     Electronically signed by Chelsey Kemp MD on 3/25/2021 at 5:08 PM

## 2021-03-25 NOTE — ED NOTES
Reassessment of the patients Fatigue and Emesis   has improved, the patients pain reassessment is a 4/10. Family at bedside. Pt states she feels better and is ready to go home. Side rails up times 2, call light in reach, will continue to monitor.        Caroline Cole RN  03/25/21 [id number]

## 2021-03-25 NOTE — Clinical Note
Patient Class: Inpatient [101]   REQUIRED: Diagnosis: Sepsis ([facility] [[id number]]   Estimated Length of Stay: Estimated stay of more than 2 midnights   Admitting Provider: Silvia Bloom [[id number]]   Telemetry Bed Required?: Yes

## 2021-03-25 NOTE — ED NOTES
Patient transported to Radiology department via [employer] tech in stable condition.        Antonio Beltran RN  03/25/21 [id number]

## 2021-03-25 NOTE — ED TRIAGE NOTES
Pt presents to ER via squad from [facility] for generalized fatigue/malaise and vomiting. Pt experienced a fall a few weeks ago fracturing her sacrum. Pt complains of sacral pain and denies having pain anywhere else. Pt has vomited a few times today. Pt is currently alert and oriented but squad reports pt become intermittently confused at times. VSS.

## 2021-03-26 PROBLEM — E43 SEVERE MALNUTRITION (HCC): Chronic | Status: ACTIVE | Noted: 2021-01-01

## 2021-03-26 NOTE — PROGRESS NOTES
IM Progress Note  Dr. Ernestine Vásquez  3/26/2021 9:51 AM      Patient name Mervin Holland  [id number]  PCP: Yuly Perez MD  Admit Date: 3/25/2021  Acct No. [de-identified]    Subjective: Interval History:   Pt resting and wants to sleep  D/w nurse   No new issues      Diet: DIET CLEAR LIQUID;    I/O last 3 completed shifts: In: 922.8 [P.O.:350; I.V.:572.8]  Out: 550 [Urine:550]  No intake/output data recorded. Admission weight: 140 lb (63.5 kg) as of 3/25/2021  7:17 AM  Wt Readings from Last 3 Encounters:   03/26/21 134 lb 12.8 oz (61.1 kg)   03/13/21 131 lb 6.4 oz (59.6 kg)   05/14/19 168 lb (76.2 kg)     Body mass index is 19.91 kg/m².     ROS   CVS;  no cp or palpitation  Resp: no SOB or cough  Neuro:  No numbness or weakness or dizziness  Abd: no nausea or vomiting,c/o generalized pain      Medications:   Scheduled Meds:   amLODIPine  2.5 mg Oral Daily    docusate sodium  100 mg Oral BID    donepezil  5 mg Oral Dinner    ferrous sulfate  325 mg Oral BID    hydrALAZINE  75 mg Oral Q8H    metoprolol tartrate  25 mg Oral BID    QUEtiapine  25 mg Oral BID    senna  2 tablet Oral Nightly    traZODone  25 mg Oral Nightly    sodium chloride flush  10 mL Intravenous 2 times per day    enoxaparin  40 mg Subcutaneous Daily    piperacillin-tazobactam  3,375 mg Intravenous Q8H    ipratropium-albuterol  1 ampule Inhalation Q4H WA    pantoprazole  40 mg Intravenous Daily    And    sodium chloride (PF)  10 mL Intravenous Daily    lidocaine  1 patch Transdermal Daily     Continuous Infusions:   sodium chloride 50 mL/hr at 03/26/21 0009       Labs :     CBC:   Recent Labs     03/25/21  0741 03/26/21  0406   WBC 16.8* 10.1   HGB 12.4 10.0*    167     BMP:    Recent Labs     03/25/21  0741 03/26/21  0404    135   K 4.1 3.9    105   CO2 26 20*   BUN 11 10   CREATININE 0.5 0.5   GLUCOSE 94 86     Hepatic:   Recent Labs     03/25/21  1335   AST 18   ALT 10*   BILITOT 0.5   ALKPHOS 125 Troponin: No results for input(s): TROPONINI in the last 72 hours. BNP: No results for input(s): BNP in the last 72 hours. Lipids: No results for input(s): CHOL, HDL in the last 72 hours. Invalid input(s): LDLCALCU  INR: No results for input(s): INR in the last 72 hours.     Radiology    Objective:   Vitals: BP (!) 161/74   Pulse 71   Temp 97.9 °F (36.6 °C) (Oral)   Resp 18   Ht 5' 9\" (1.753 m)   Wt 134 lb 12.8 oz (61.1 kg)   SpO2 93%   BMI 19.91 kg/m²   HEENT: Head:pupils react  Neck: supple  Lungs: clear to auscultation  Heart: regular rate and rhythm   Abdomen: soft BS heard   Extremities: warm  edema  Neurologic:  Asleep did respond to name    Impression:   :   Possible aspiration pneumonia with sepsis  Recent sacral fracture with fall  N/V improved  LBBB not new  HTN  Breast Cancer history s/p mstectomy  GERD  Dementia  Elevated trop  Leucocytosis improved  Mild to mod Aortic stenosis valve 1.3sq cm          Plan:    Cont bronchodilators antibiotics  F/u  CXR in few days  PT OT  Cardio consulted   Cont B Blockers JULIANNE Miller MD

## 2021-03-26 NOTE — PLAN OF CARE
Problem: Falls - Risk of:  Goal: Will remain free from falls  Description: Will remain free from falls  Outcome: Ongoing  Goal: Absence of physical injury  Description: Absence of physical injury  Outcome: Ongoing  Patient free from falls thus far this shift. Call light within reach, bedside rails up x 2, non-skid socks on, bed alarm on. Problem: Skin Integrity:  Goal: Will show no infection signs and symptoms  Description: Will show no infection signs and symptoms  Outcome: Ongoing  Goal: Absence of new skin breakdown  Description: Absence of new skin breakdown  Outcome: Ongoing  Patient has existing bed sores on admission. Patient refusing to turn but RN encouraging turning in bed. Heels elevated on pillows. Problem: Discharge Planning:  Goal: Discharged to appropriate level of care  Description: Discharged to appropriate level of care  Outcome: Ongoing  Discharge planning in progress. Problem: Gas Exchange - Impaired:  Goal: Levels of oxygenation will improve  Description: Levels of oxygenation will improve  Outcome: Ongoing  Patient remains on room air at this time. Continuing to monitor for oxygen needs. Problem: Infection, Septic Shock:  Goal: Will show no infection signs and symptoms  Description: Will show no infection signs and symptoms  Outcome: Ongoing  WBC elevated. No fevers noted. Care plan reviewed with patient. Patient verbalize understanding of the plan of care and contribute to goal setting.

## 2021-03-26 NOTE — PROGRESS NOTES
Michelle [facility] 60  PHYSICAL THERAPY MISSED TREATMENT NOTE  [facility]    Date: 3/26/2021  Patient Name: Reese Lugo        MRN: [id number]   : 10/3/1931  (80 y.o.)  Gender: female                REASON FOR MISSED TREATMENT:  Patient refused treatment. Pt refuses PT, wants to rest, sat EOB with OT already this morning. Will check back as able.

## 2021-03-26 NOTE — CONSULTS
[address] [place] [address]                                  CONSULTATION    PATIENT NAME: Sherine Montaño                    :        10/03/1931  MED REC NO:   [id number]                           ROOM:       [id number]  ACCOUNT NO:   [de-identified]                           ADMIT DATE: 2021  PROVIDER:     CHANCE Chowdhuryeous:  2021    REASON FOR CONSULTATION:  Elevated troponin in an 80year-old female  admitted after recurrent nausea, vomiting and with history of recent  fall from extended care facility and under treatment now for sepsis and  possible aspiration pneumonia. HISTORY OF PRESENT ILLNESS:  This is an 66-year-old  female  patient with history of hypertension, numerous orthopedic surgeries, and  dementia presented from [facility] care facility with nausea, vomiting  over the 24 hours prior to admission. The patient resides at [facility]  and family was notified. The day of admission, the episode of nausea  and vomiting throughout the night, and the patient did not eat almost  for 24 hours and so, the patient was confused, that is reported to be  baseline per the documentation here and per the family record. She has  been recently admitted to [facility] for surgery due to sacral fracture of  the sacrum. So in the emergency department, the patient reported chest  pain free, no chest pain, no history of chest pain and now the patient  report no history of chest pain, but the troponin was elevated to 0.019. White blood cell elevated. She was hypoxic, saturation 99%. She was  tachypneic. Respiratory rate was 30 to 40.   Urinalysis was negative for  any infectious process and chest x-ray showed bilateral pleural  effusion, some infiltrate and CT confirmed bilateral pleural effusion,  pericardial infarction, diverticulosis without evidence of  diverticulitis, but denied any chest pain or abdominal Recent sacral fracture  with fall and history of nausea and vomiting, improved. Now no vomiting  in the last 24 hours. 2.  Mild-to-moderate aortic stenosis. Left bundle-branch block not new. Elevated troponin seems to be related to the aspiration pneumonia with  sepsis; recurrent nausea and vomiting; so is secondary, has only minimal  elevation. 3.  History of hypertension. 4.  Gastroesophageal reflux disease. 5.  Dementia. 6.  Leukocytosis, improving. 7.  History of breast cancer, status post mastectomy. PLAN AND RECOMMENDATION:  At this level, EF no change, low normal and at  this level, the patient is very frail. One should consider functional  study should the family need everything to be done, but my  recommendation on this patient is that she is very frail, bedridden, no  chest pain, no complaint. Troponin is secondary. I will recommend  medical therapy. She has severe back pain after a fall with sacral  fracture and moving around will make her more painful and more  miserable, so my feeling is continuation with medical therapy. However,  if the family wants everything to be done, at this level in general,  functional study is the maximum one could go ahead. Functional study  could be considered. 2.  To be decided down the line. 3.  I do not see in the documentation any history of coronary artery  disease. Thank you for allowing me to participate in the care of this patient. I  will follow up with you. Renée Wilson.  Palomo Cope M.D.    D: 03/26/2021 10:20:28       T: 03/26/2021 12:38:36     [email or web address]  Job#: [id number]     Doc#: [id number]    CC:

## 2021-03-26 NOTE — PLAN OF CARE
Problem: Nutrition  Goal: Optimal nutrition therapy  Outcome: Ongoing  Nutrition Problem #1: Severe malnutrition  Intervention: Food and/or Nutrient Delivery: Continue Current Diet, Start Oral Nutrition Supplement  Nutritional Goals: Pt. will receive [place] diet or greater in next 1-4 days.

## 2021-03-26 NOTE — CARE COORDINATION
3/26/21, 11:08 AM EDT  Discharge Planning Evaluation  Social work consult received, patient from [facility]. Patient preference is to return to [facility]. Attempted to contact son Howie Butt. He did not return phone call . The patient's current payor source at the facility is Medicaid. Medicare skilled days available: yes  Insurance precert:  Yes   Spoke with Irina at the facility. Patient bed hold: yes  Anticipated transport plan: ambulance  Do they require COVID 19 test to return to F:not unless she has symptoms.    Is there a required time frame which which COVID test needs done:NA

## 2021-03-26 NOTE — PROGRESS NOTES
Miquelkianhugh Ferguson 60  INPATIENT OCCUPATIONAL THERAPY  [facility] CCU-STEPDOWN 3B  EVALUATION    Time:   Time In: 3604  Time Out: 0940  Timed Code Treatment Minutes: 15 Minutes  Minutes: 30          Date: 3/26/2021  Patient Name: Lashon Jackson,   Gender: female      MRN: [id number]  : 10/3/1931  (80 y.o.)  Referring Practitioner: Dr. Claudio Friedman MD  Diagnosis: Sepsis  Additional Pertinent Hx: Pt presents with nausea and vomiting for the past 24 hours. Patient resides at [facility] and they called the family this morning stating that she had been vomiting through the night and had not eaten anything since yesterday afternoon. Patient is confused but this is baseline for her per family. She was recently admitted to [facility] for surgery due to a fracture of the sacrum. In the emergency department troponin was elevated to 0.019, WBC elevated to 16.9, patient was hypoxic at 90% and RR between 30 and 40. UA was negative for infection but positive for ketones and bilirubin. Chest X Ray showed bilateral pleural effusions. CT abdomen confirmed bilateral pleural effusions, fecal impaction and severe diverticulosis with no convincing evidence for diverticulitis. She denies chest pain or abdominal pain. She continues to be nauseous and have no appetite but no more emesis since coming to the ED. Restrictions/Precautions:  Restrictions/Precautions: Fall Risk  Position Activity Restriction  Other position/activity restrictions: Hx of pelvic fx 3 wks ago; uses a pocket talker; skin protection- pt with pressure sores on her coccyx and buttocks    Subjective  Chart Reviewed: Yes, Orders, History and Physical    Subjective: Pleasant. Comments: RN approved session. Pt was in bed with a breakfast tray being brought into her room at the start of session. She C/O severe pain in her back which was when she moves or sits up. She has pelvic fracture in past 3 weeks. She refused to stand.   She was very tearful when sitting at the edge of bed    Pain:  Pain Assessment  Patient Currently in Pain: Yes  Pain Assessment: Faces  Tidwell-Baker Pain Rating: Hurts worst  Pain Type: Acute pain  Pain Location: Sacrum;Back  Pain Orientation: Lower  Pain Descriptors: Aching; Sharp  Pain Frequency: Continuous  Clinical Progression: Not changed  Patient's Stated Pain Goal: No pain  Response to Pain Intervention: Patient Satisfied  Multiple Pain Sites: No    Vitals: Vitals not assessed per clinical judgement, see nursing flowsheet    Social/Functional History:  Lives With: Alone  Type of Home: Facility  Home Layout: One level  Home Access: Level entry   Bathroom Shower/Tub: Walk-in shower  Bathroom Toilet: Bedside commode    Receives Help From: Other (comment)(staff as available)  ADL Assistance: Needs assistance  Homemaking Assistance: Needs assistance  Homemaking Responsibilities: No  Transfer Assistance: Needs assistance    Active : No  Patient's  Info: son or daughter in law were helping prior to her fall and admission to SNF  Occupation: Retired  Leisure & Hobbies: Doing word searches  Additional Comments: Pt was not able to describe her prior level of function. She was doing her own showering while at home prior to her admission to SNF. Cognition/Orientation:  Overall Orientation Status: Within Functional Limits  Overall Cognitive Status: Exceptions  Cognition Comment: Pt needed cues for problem solving her current situation; pt has poor recall of prior level of function. She presents with poor coping with her pain. Her nurse was notified of her pain complaint. ADL's:  LE Dressing: Maximum assistance(donning her slipper socks)       Functional Mobility:  Bed mobility  Rolling to Right: Minimal assistance(using the bedrail with help needed to also push off with her LLE)  Supine to Sit: Moderate assistance(using the bedrail with cues to keep her eyes open)  Sit to Supine:  Moderate assistance(extra time taken to slowly bet back into supine)  Comment: Pt was closing her eyes when moving and needed cues to open them and take slow breaths    Functional Mobility  Functional Mobility Comments: Not able to demonstrate     Balance:  Balance  Sitting Balance: Contact guard assistance(Pt sat at the edge of bed while talking about her current situation)  Standing Balance  Time: Not applicable    Transfers:          Upper Extremity Assessment:Hand Dominance: Right  LUE AROM : WFL  Left Hand AROM: WFL  RUE AROM : WFL  Right Hand AROM: WFL    LUE Strength  L Hand General: 4/5  LUE Strength Comment: 3+/5 deltoid; 3+/5 pectoral; 4-/5 biceps; 4/5 triceps    RUE Strength  R Hand General: 4/5  RUE Strength Comment: 3+/5 deltoid; 3+/5 pectoral; 4-/5 biceps; 4/5 triceps      Fine Motor Skills  Fine Motor Comment: Pt could hold onto her water pitcher while taking a sip. She also used a swab to moisten her lips. Activity Tolerance: Patient limited by pain, Patient limited by fatigue  Pt was able to sit at the edge of bed for 4 minute duration. She C/O severe pain and refused any further mobility. She was assisted back into supine. Cues provided for slow breathing and keeping her eyes open. Assessment:  Assessment: Patient would benefit from continued skilled OT services to address above deficits. She presents with sepsis. She has had recent history of a fall while at home. She suffered a fractured sacrum and was discharged from the hospital to a SNF at that time. Pt had help with her self care and functional mobility. Pt now has a pressure sore on her sacrum and also her L buttock. She demonstrates bed mobility with MOD A for getting into sitting position and also returning to supine. Pt has pain behaviors such as closing her eyes and arching her neck. She needed cues to keep her eyes open and to take slow breaths. She tolerated sitting at the edge of bed for 4 minute duration.       Performance deficits / Impairments: Decreased functional Treatment: Functional mobility when able; ADLs while upright; pain management techniques    Goals:  Patient goals : \"I want to get rid of the pain. \" pt states. Short term goals  Time Frame for Short term goals: By discharge  Short term goal 1: Pt will demonstrate functional transfers with OTR to prepare for doing self care while out of bed. Short term goal 2: Pt will participate in upper body ADLs while in an upright position with setup A to increase her independence with self care. Short term goal 3: Pt will complete BUE light or moderate resistance exercises with min cues for slow breathing to increase her endurance and strength for ease of doing self care. Short term goal 4: Pt will engage in relaxed breathing and keeping her eyes open while doing any bed mobility or activities in sitting position to increase her pain tolerance and ability to participate. See long-term goal time frame for expected duration of plan of care. If no long-term goals established, a short length of stay is anticipated. Following session, patient left in safe position with all fall risk precautions in place.

## 2021-03-26 NOTE — CONSULTS
Probable aspiration pneumonia with sepsis  Recent sacral fracture with fall  N/V resolved for 24 hrs  Elevated troponin due to above  Mild to mod Aortic stenosis valve 1.3sq cm  LBBB not new  HTN  Breast Cancer history s/p mstectomy  GERD  Dementia  Leucocytosis improving    Plan  OMT  Function study when stable    [id number]      Ruben Carrillo MD

## 2021-03-26 NOTE — CARE COORDINATION
03/26/21 0840   Readmission Assessment   Number of Days since last admission? 8-30 days   Previous Disposition SNF   Who is being Interviewed Caregiver   What was the patient's/caregiver's perception as to why they think they needed to return back to the hospital? Other (Comment)  (Began vomiting)   Did you visit your Primary Care Physician after you left the hospital, before you returned this time? No   Why weren't you able to visit your PCP? Other (Comment)  Dewayne Garg to SNF.)   Did you see a specialist, such as Cardiac, Pulmonary, Orthopedic Physician, etc. after you left the hospital? No   Who advised the patient to return to the hospital? Skilled Unit   Does the patient report anything that got in the way of taking their medications? No   In our efforts to provide the best possible care to you and others like you, can you think of anything that we could have done to help you after you left the hospital the first time, so that you might not have needed to return so soon?  Other (Comment)  (Went to SNF, nothing could have done.)

## 2021-03-26 NOTE — CARE COORDINATION
3/26/21, 7:30 AM EDT  DISCHARGE PLANNING EVALUATION:    Cathie Oliveros       Admitted: 3/25/2021/ [facility] day: 1   Location: [facility]24/024-A Reason for admit: Sepsis ([facility] [address] [A41.9]   PMH:  has a past medical history of Arthritis, Bilateral carotid artery disease ([address] Breast CA ([address] Chronic diastolic CHF (congestive heart failure) ([address] Chronic kidney disease, Colon, diverticulosis, Dementia ([address] Dysphagia, oropharyngeal phase, GERD (gastroesophageal reflux disease), Hiatal hernia with gastroesophageal reflux, Hypertension, Insomnia, LBBB (left bundle branch block), Mood disorder ([facility] [address] Movement disorder, Neurogenic bladder, Osteoporosis, and Pneumonia. Procedure: 3/25 Limited Echo - EF 50%. Left atrium moderately dilated. Small circumferential pericardial effusion. 3/25 Full Echo - results pending. 3/25 CT Abd/Pelvis - Bilateral moderate pleural effusions and adjacent atelectasis. Severe diverticulosis with no convincing evidence for diverticulitis. 3/25 CXR - Moderate cardiomegaly and increased pulmonary vascularity. Abnormal density left retrocardiac region suggestive of infiltrate and possible effusion. Barriers to Discharge:  Admitted through ED with emesis for the 24 hours prior to admission. Troponins 0.019, 0.014 and 0.011. WBC 16.8, down to 10.1. Albumin 2.6. Lactic acid 0.8. Tmax 99.1F. Lovenox. DuoNeb q4hr [employer]. Protonix iv daily. Zosyn iv q8hr. PT/OT. CL diet. Dietitian. [facility]. PCP: Karey Ayers MD  Readmission Risk Score: 21%    Patient Goals/Plan/Treatment Preferences: Pt from [facility] (had been at [facility]). SW consulted. Transportation/Food Security/Housekeeping Addressed:  No issues identified.

## 2021-03-26 NOTE — PROGRESS NOTES
Comprehensive Nutrition Assessment    Type and Reason for Visit:  Initial, Positive Nutrition Screen, Consult(Unintentional Weight Loss; Nausea/Vomiting, Poor intake, Poor appetite, Weight loss, wound)    Nutrition Recommendations/Plan:   Will send Ensure Clear TID (with CL diet). Consider MVI once nausea resolves. Question if pt. Would benefit from appetite stimulant. Nutrition Assessment:    Pt. severely malnourished AEB criteria listed below. At risk for further nutritional compromise r/t altered GI function (N/V), increased nutrient needs for wound healing, advanced age, dementia and underlying medical condition (hx breast cancer, CHF, CKD, diverticulosis, GERD, hiatal hernia). Nutrition recommendations/interventions as per above. Malnutrition Assessment:  Malnutrition Status:  Severe malnutrition    Context:  Chronic Illness     Findings of the 6 clinical characteristics of malnutrition:  Energy Intake:  7 - 75% or less estimated energy requirements for 1 month or longer  Weight Loss:  (-20.7% reported since last year)     Body Fat Loss:  7 - Severe body fat loss Orbital, Fat Overlying Ribs   Muscle Mass Loss:  1 - Mild muscle mass loss(moderate) Clavicles (pectoralis & deltoids)  Fluid Accumulation:  Unable to assess     Strength:  Not Performed    Estimated Daily Nutrient Needs:  Energy (kcal):  2250-9634 kcals (28-32); Weight Used for Energy Requirements:  (61 kgm 3/26)     Protein (g):  85+ grams (1.4+); Weight Used for Protein Requirements:  (61 kgm)              Nutrition Related Findings:   Pt. Seen with son- appears very thin, frail; son states he hasn't been able to visit ECF-only seeing pt. When in hospital - pt. Very [place]; has headphones with microphone; admit with N/V; ECF staff states pt. Appetite is \"hit or miss\"; some days eats ok and others doesn't; drinks Boost pta; denies pt. Having any trouble with chewing/swallowing;  Rx includes Zofran, ATB, Senokot, Seroquel, Glycolax,

## 2021-03-26 NOTE — PLAN OF CARE
Problem: Falls - Risk of:  Goal: Will remain free from falls  Description: Will remain free from falls  3/26/2021 1153 by Carter Issa RN  Outcome: Ongoing  3/26/2021 0127 by Swapnil Beatty RN  Outcome: Ongoing   Patients bed alarm on, pt   Problem: Falls - Risk of:  Goal: Absence of physical injury  Description: Absence of physical injury  3/26/2021 1153 by Carter Issa RN  Outcome: Ongoing  3/26/2021 0127 by Swapnil Beatty RN  Outcome: Ongoing   Patient checked by staff hourly, patient free from injury  Problem: Skin Integrity:  Goal: Will show no infection signs and symptoms  Description: Will show no infection signs and symptoms  3/26/2021 1153 by Carter Issa RN  Outcome: Ongoing  3/26/2021 0127 by Swapnil Beatty RN  Outcome: Ongoing    Problem: Skin Integrity:  Goal: Absence of new skin breakdown  Description: Absence of new skin breakdown  3/26/2021 1153 by Carter Issa RN  Outcome: Ongoing  3/26/2021 0127 by Swapnil Beatty RN  Outcome: Ongoing   Patient refuses to turn, will continue to encourage

## 2021-03-27 NOTE — PLAN OF CARE
Exchange - Impaired:  Goal: Levels of oxygenation will improve  Description: Levels of oxygenation will improve  3/27/2021 1330 by Albino Boyd RN  Outcome: Ongoing  Note: Pulse ox maintained in mid 90s on room air without the use of supplemental O2  3/27/2021 0423 by Tico Franklin RN  Outcome: Ongoing     Problem: RESPIRATORY  Goal: Clear lung sounds  Description: Clear lung sounds  3/27/2021 0843 by Marino Pinedo RCP  Outcome: Ongoing     Problem: Nutrition  Goal: Optimal nutrition therapy  3/27/2021 1330 by Albino Boyd RN  Outcome: Ongoing  Note: Patient eating very little even when we try and fed her,  It seems as if she also has issues swallowing liquids at times, almost as if she tries to drink to much to fast and then chokes.   Maybe a swallow eval should be requested     Problem: Pain:  Goal: Pain level will decrease  Description: Pain level will decrease  3/27/2021 1330 by Albino Boyd RN  Outcome: Not Met This Shift  Note: Patient continues to c/o of pain \" a 10, 20, 30\" even with the change in her pain medication  3/27/2021 0423 by Tico Franklin RN  Outcome: Ongoing     Problem: Pain:  Goal: Control of acute pain  Description: Control of acute pain  3/27/2021 1330 by Albino Boyd RN  Outcome: Not Met This Shift  3/27/2021 0423 by Tico Franklin RN  Outcome: Ongoing     Problem: Pain:  Goal: Control of chronic pain  Description: Control of chronic pain  3/27/2021 1330 by Albino Boyd RN  Outcome: Not Met This Shift  3/27/2021 0423 by Tico Franklin RN  Outcome: Ongoing

## 2021-03-27 NOTE — PROGRESS NOTES
INTERNAL MEDICINE SPECIALTIES  Progress Note For Dr Louis Gutiérrez covering for Dr Catracho James         Patient:  Merissa Gipson  YOB: 1931  Date of Service: 3/27/2021  MRN: [id number]   Acct:  [de-identified]   Primary Care Physician: April Nugent MD    SUBJECTIVE: Has bee having pain in lower back, states norco worked better while in [facility] Medications:   No current facility-administered medications on file prior to encounter. Current Outpatient Medications on File Prior to Encounter   Medication Sig Dispense Refill    Nutritional Supplements (BOOST VERY HIGH CALORIE) LIQD Take by mouth 2 times daily      HYDROcodone-acetaminophen (NORCO) 5-325 MG per tablet Take 2 tablets by mouth 2 times daily.  HYDROcodone-acetaminophen (NORCO) 5-325 MG per tablet Take 1 tablet by mouth every 6 hours as needed for Pain (for breakthrough pain).       ondansetron (ZOFRAN) 4 MG tablet Take 4 mg by mouth every 8 hours as needed for Nausea or Vomiting      hydrALAZINE (APRESOLINE) 25 MG tablet Take 3 tablets by mouth every 8 hours 90 tablet 3    cloNIDine (CATAPRES) 0.1 MG tablet Take 1 tablet by mouth every 8 hours as needed for High Blood Pressure 60 tablet 3    QUEtiapine (SEROQUEL) 25 MG tablet Take 1 tablet by mouth 2 times daily 60 tablet 3    amLODIPine (NORVASC) 2.5 MG tablet Take 1 tablet by mouth daily 30 tablet 3    lidocaine 4 % external patch Place 3 patches onto the skin daily (Patient taking differently: Place 1 patch onto the skin daily )      docusate sodium (COLACE, DULCOLAX) 100 MG CAPS Take 100 mg by mouth 2 times daily      polyethylene glycol (GLYCOLAX) 17 g packet Take 17 g by mouth daily as needed for Constipation 527 g 1    senna (SENOKOT) 8.6 MG tablet Take 2 tablets by mouth nightly 60 tablet 0    donepezil (ARICEPT) 5 MG tablet Take 1 tablet by mouth Daily with supper 30 tablet 3    TRAZODONE HCL PO Take 25 mg by mouth nightly       metoprolol tartrate (LOPRESSOR) 25 MG tablet Take 1 tablet by mouth 2 times daily 60 tablet 3    acetaminophen (APAP EXTRA STRENGTH) 500 MG tablet Take 2 tablets by mouth every 6 hours as needed for Pain 120 tablet 3    ferrous sulfate (FE TABS) 325 (65 Fe) MG EC tablet Take 1 tablet by mouth 2 times daily           Scheduled Meds:   aspirin  81 mg Oral Daily    amLODIPine  2.5 mg Oral Daily    docusate sodium  100 mg Oral BID    donepezil  5 mg Oral Dinner    ferrous sulfate  325 mg Oral BID    hydrALAZINE  75 mg Oral Q8H    metoprolol tartrate  25 mg Oral BID    QUEtiapine  25 mg Oral BID    senna  2 tablet Oral Nightly    traZODone  25 mg Oral Nightly    sodium chloride flush  10 mL Intravenous 2 times per day    enoxaparin  40 mg Subcutaneous Daily    piperacillin-tazobactam  3,375 mg Intravenous Q8H    ipratropium-albuterol  1 ampule Inhalation Q4H WA    pantoprazole  40 mg Intravenous Daily    And    sodium chloride (PF)  10 mL Intravenous Daily    lidocaine  1 patch Transdermal Daily     Continuous Infusions:   sodium chloride 50 mL/hr at 03/26/21 2009     PRN Meds:cloNIDine, polyethylene glycol, sodium chloride flush, acetaminophen **OR** acetaminophen, ondansetron, traMADol        Allergies:  Morphine    OBJECTIVE:    Vitals:   Vitals:    03/27/21 0841   BP:    Pulse:    Resp:    Temp:    SpO2: 95%      BMI: Body mass index is 20.79 kg/m². PHYSICAL EXAMINATION:            General appearance:  No apparent distress, appears stated age and cooperative. HEENT:  Normal cephalic, atraumatic without obvious deformity. Pupils equal, round, and reactive to light. Extra ocular muscles intact. Conjunctivae/corneas clear. Neck: Supple   Respiratory: Diminished  air entry bases  Cardiovascular:  Regular rhythm with normal S1/S2,  without rubs or gallops. Abdomen: Soft, non-tender, non-distended with normal bowel sounds. Musculoskeletal:  No clubbing, cyanosis or edema bilaterally.     Neurologic: alert, Mild mitral regurgitation is present. Aortic Valve  Aortic valve appears tricuspid. Aortic valve leaflets are Moderately  calcified. Leaflets exhibited mildly increased thickness and mildly  reduced cuspal separation of the aortic valve. Mild aortic regurgitation  is noted. There is mild-to-moderate aortic stenosis with valve area of 1.3 sq cm. The maximum aortic valve gradient is 23 mmHg, the mean gradient is 11  mmHg, and the peak velocity is 2.4 m/s. Tricuspid Valve  The tricuspid valve structure was normal with normal leaflet separation. DOPPLER: There was no evidence of tricuspid stenosis. Mild to moderate tricuspid regurgitation visualized. Right ventricular systolic pressure measures 40 mmhg. Pulmonic Valve  The pulmonic valve leaflets exhibited normal thickness, no calcification,  and normal cuspal separation. DOPPLER: The transpulmonic velocity was  within the normal range with no evidence for regurgitation. Left Atrium  The left atrium is Moderately dilated. Left Ventricle  Normal left ventricle size and systolic function. Ejection fraction was  estimated at 50 %. There were no regional left ventricular wall motion  abnormalities and wall thickness was within normal limits. Right Atrium  Mildly enlarged right atrium size. Right Ventricle  The right ventricular size was normal with normal systolic function and  wall thickness. Pericardial Effusion  There is a small circumferential pericardial effusion with no evidence of  hemodynamic compromise. Pleural Effusion  No evidence of pleural effusion. Aorta / Great Vessels  -Aortic root dimension within normal limits.  -The Pulmonary artery is within normal limits. -IVC size is within normal limits with normal respiratory phasic changes.   M-Mode/2D Measurements & Calculations   LV Diastolic   LV Systolic Dimension:    AV Cusp Separation: 1.8 cmLA  Dimension: 4.8 3.6 cm                    Dimension: 4.1 cmAO Root  cm             LV created using voice recognition software. It may contain minor errors which are inherent in voice recognition technology. ** Final report electronically signed by Dr. Carlos Rahman on 3/25/2021 9:00 AM    Xr Chest Portable    Result Date: 3/25/2021  PROCEDURE: XR CHEST PORTABLE CLINICAL INFORMATION: SOB. COMPARISON: Chest x-ray dated 7 June 2018. TECHNIQUE: AP upright view of the chest. FINDINGS: There is moderate cardiomegaly. There is atherosclerotic calcification aortic arch. There is abnormal density in the left retrocardiac region suggestive of infiltrate and possible effusion. . The pulmonary vascularity is slightly increased. There are postoperative changes in the lower thoracic and upper lumbar spine and left shoulder. .     1. Moderate cardiomegaly and increased pulmonary vascularity. 2. Abnormal density left retrocardiac region suggestive of infiltrate and possible effusion. 3. Atherosclerotic calcification mild dilatation of the ascending aorta. 4. Postoperative changes involving the left shoulder, lower thoracic and lumbar spine. . **This report has been created using voice recognition software. It may contain minor errors which are inherent in voice recognition technology. ** Final report electronically signed by DR Arsen Oconnell on 3/25/2021 11:35 AM        ASSESMENT:      Active Problems:    Severe malnutrition ([address]    Sepsis ([address]  Resolved Problems:    * No resolved hospital problems. *  OTHER PROBLEMS:  Aspiration pneumonia  Elevated troponin  S/p nausae/vomiting  S2 fracture    PLAN:  Continue bronchodilators and antibiotics, leucocytosis improved. F/u  CXR in few days  Seen by cardiologist, medical management recommended  Cont B Blockers ASA   PT OT  Pain control. Discussed with son.      DVT prophylaxis: [] Lovenox                                 [] SCDs                                 [] SQ Heparin                                 [] Encourage ambulation, low risk for DVT, no chemical or mechanical prophylaxis necessary              [] Already on Anticoagulation                Anticipated Disposition upon discharge: [] Home                                                                         [] Home with Home Health                                                                         [] [facility]                                                                         [] [address]          Electronically signed by Virginia Yuan MD on 3/27/2021 at 10:08 AM

## 2021-03-27 NOTE — PLAN OF CARE
Problem: Falls - Risk of:  Goal: Will remain free from falls  Description: Will remain free from falls  Outcome: Ongoing  Goal: Absence of physical injury  Description: Absence of physical injury  Outcome: Ongoing  Patient free from falls thus far this shift. Call light within reach, non-skid socks on, bed alarm on. Problem: Skin Integrity:  Goal: Will show no infection signs and symptoms  Description: Will show no infection signs and symptoms  Outcome: Ongoing  Goal: Absence of new skin breakdown  Description: Absence of new skin breakdown  Outcome: Ongoing   Stage 2 bed sores on buttocks. Problem: Discharge Planning:  Goal: Discharged to appropriate level of care  Description: Discharged to appropriate level of care  Outcome: Ongoing  Discharge planning in progress. Problem: Gas Exchange - Impaired:  Goal: Levels of oxygenation will improve  Description: Levels of oxygenation will improve  Outcome: Ongoing     Problem: Infection, Septic Shock:  Goal: Will show no infection signs and symptoms  Description: Will show no infection signs and symptoms  Outcome: Ongoing   Aspiration precautions in place. Problem: Pain:  Goal: Pain level will decrease  Description: Pain level will decrease  Outcome: Ongoing  Goal: Control of acute pain  Description: Control of acute pain  Outcome: Ongoing  Goal: Control of chronic pain  Description: Control of chronic pain  Outcome: Ongoing    Care plan reviewed with patient . Patient verbalize understanding of the plan of care and contribute to goal setting.
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
## 2021-03-28 NOTE — PLAN OF CARE
Problem: Falls - Risk of:  Goal: Will remain free from falls  Description: Will remain free from falls  3/27/2021 2106 by Harris Lombardi RN  Outcome: Ongoing  3/27/2021 1330 by Martin Panda RN  Outcome: Ongoing  Note: No falls this shift. Patient stays in bed - doesn't want to get up and even fights against turning. Side rails up x 3 and bed alarm on and call light within reach  Goal: Absence of physical injury  Description: Absence of physical injury  3/27/2021 2106 by Harris Lombardi RN  Outcome: Ongoing  3/27/2021 1330 by Martin Panda RN  Outcome: Met This Shift     Problem: Skin Integrity:  Goal: Will show no infection signs and symptoms  Description: Will show no infection signs and symptoms  3/27/2021 2106 by Harris Lombardi RN  Outcome: Ongoing  3/27/2021 1330 by Martin Panda RN  Outcome: Ongoing  Note: Afebrile. WBC count is coming down. No signs of infection noted  Goal: Absence of new skin breakdown  Description: Absence of new skin breakdown  3/27/2021 2106 by Harris Lombardi RN  Outcome: Ongoing  3/27/2021 1330 by Martin Panda RN  Outcome: Ongoing  Note: No new skin breakdown  noted. EPC applied to patient's bottom when turned.   Pillows used at pressure points     Problem: Discharge Planning:  Goal: Discharged to appropriate level of care  Description: Discharged to appropriate level of care  3/27/2021 2106 by Harris Lombardi RN  Outcome: Ongoing  3/27/2021 1330 by Martin Panda RN  Outcome: Ongoing  Note: Plan is for patient to go to [facility] at discharge     Problem: Gas Exchange - Impaired:  Goal: Levels of oxygenation will improve  Description: Levels of oxygenation will improve  3/27/2021 2106 by Harris Lombardi RN  Outcome: Ongoing  3/27/2021 1330 by Martin Panda RN  Outcome: Ongoing  Note: Pulse ox maintained in mid 90s on room air without the use of supplemental O2     Problem: Infection, Septic Shock:  Goal: Will show no infection signs and symptoms  Description: Will show no infection signs and symptoms  3/27/2021 2106 by Osman Calles RN  Outcome: Ongoing  3/27/2021 1330 by Kristan Garcia RN  Outcome: Met This Shift     Problem: Pain:  Goal: Pain level will decrease  Description: Pain level will decrease  3/27/2021 2106 by Osman Calles RN  Outcome: Ongoing  3/27/2021 1330 by Kristan Garcia RN  Outcome: Not Met This Shift  Note: Patient continues to c/o of pain \" a 10, 20, 30\" even with the change in her pain medication  Goal: Control of acute pain  Description: Control of acute pain  3/27/2021 2106 by Osman Calles RN  Outcome: Ongoing  3/27/2021 1330 by Kristan Garcia RN  Outcome: Not Met This Shift  Goal: Control of chronic pain  Description: Control of chronic pain  3/27/2021 2106 by Osman Calles RN  Outcome: Ongoing  3/27/2021 1330 by Kristan Garcia RN  Outcome: Not Met This Shift     Problem: RESPIRATORY  Goal: Clear lung sounds  Description: Clear lung sounds  3/27/2021 0843 by David Coulter RCP  Outcome: Ongoing     Problem: Nutrition  Goal: Optimal nutrition therapy  3/27/2021 2106 by Osman Calles RN  Outcome: Ongoing  3/27/2021 1330 by Kristan Garcia RN  Outcome: Ongoing  Note: Patient eating very little even when we try and fed her,  It seems as if she also has issues swallowing liquids at times, almost as if she tries to drink to much to fast and then chokes.   Maybe a swallow eval should be requested

## 2021-03-28 NOTE — PLAN OF CARE
Problem: Falls - Risk of:  Goal: Will remain free from falls  Description: Will remain free from falls  Note: No falls this shift. Bed in locked and low position with side rails up x 2 and call light within reach. Patient states that for last 2 years she hasn't walked - just got up to wheelchair from bed     Problem: Falls - Risk of:  Goal: Absence of physical injury  Description: Absence of physical injury  Outcome: Met This Shift     Problem: Skin Integrity:  Goal: Will show no infection signs and symptoms  Description: Will show no infection signs and symptoms  Outcome: Ongoing  Note: No signs of infection. Afebrile. . WBC in normal limits     Problem: Skin Integrity:  Goal: Absence of new skin breakdown  Description: Absence of new skin breakdown  Outcome: Met This Shift     Problem: Discharge Planning:  Goal: Discharged to appropriate level of care  Description: Discharged to appropriate level of care  Outcome: Ongoing  Note: Plan is to go back to [facility] at discharge     Problem: Gas Exchange - Impaired:  Goal: Levels of oxygenation will improve  Description: Levels of oxygenation will improve  Outcome: Met This Shift  Note: Patient running O2 sats in mid 90s on room air     Problem: Infection, Septic Shock:  Goal: Will show no infection signs and symptoms  Description: Will show no infection signs and symptoms  Outcome: Met This Shift     Problem: Pain:  Goal: Pain level will decrease  Description: Pain level will decrease  Outcome: Ongoing  Note: Denies pain for me this shift     Problem: Pain:  Goal: Control of acute pain  Description: Control of acute pain  Outcome: Met This Shift     Problem: Pain:  Goal: Control of chronic pain  Description: Control of chronic pain  Outcome: Met This Shift     Problem: Nutrition  Goal: Optimal nutrition therapy  Outcome: Ongoing  Note: Patient remains on clear liquids - is eating a little better today than yesterday

## 2021-03-28 NOTE — PROGRESS NOTES
INTERNAL MEDICINE SPECIALTIES  Progress Note For Dr Gail Barnett covering for Dr Brendon Sinha         Patient:  Bertin Pereira  YOB: 1931  Date of Service: 3/28/2021  MRN: [id number]   Acct:  [de-identified]   Primary Care Physician: Trish Andrade MD    SUBJECTIVE: has no complaints presently  Home Medications:   No current facility-administered medications on file prior to encounter. Current Outpatient Medications on File Prior to Encounter   Medication Sig Dispense Refill    Nutritional Supplements (BOOST VERY HIGH CALORIE) LIQD Take by mouth 2 times daily      HYDROcodone-acetaminophen (NORCO) 5-325 MG per tablet Take 2 tablets by mouth 2 times daily.  HYDROcodone-acetaminophen (NORCO) 5-325 MG per tablet Take 1 tablet by mouth every 6 hours as needed for Pain (for breakthrough pain).       ondansetron (ZOFRAN) 4 MG tablet Take 4 mg by mouth every 8 hours as needed for Nausea or Vomiting      hydrALAZINE (APRESOLINE) 25 MG tablet Take 3 tablets by mouth every 8 hours 90 tablet 3    cloNIDine (CATAPRES) 0.1 MG tablet Take 1 tablet by mouth every 8 hours as needed for High Blood Pressure 60 tablet 3    QUEtiapine (SEROQUEL) 25 MG tablet Take 1 tablet by mouth 2 times daily 60 tablet 3    amLODIPine (NORVASC) 2.5 MG tablet Take 1 tablet by mouth daily 30 tablet 3    lidocaine 4 % external patch Place 3 patches onto the skin daily (Patient taking differently: Place 1 patch onto the skin daily )      docusate sodium (COLACE, DULCOLAX) 100 MG CAPS Take 100 mg by mouth 2 times daily      polyethylene glycol (GLYCOLAX) 17 g packet Take 17 g by mouth daily as needed for Constipation 527 g 1    senna (SENOKOT) 8.6 MG tablet Take 2 tablets by mouth nightly 60 tablet 0    donepezil (ARICEPT) 5 MG tablet Take 1 tablet by mouth Daily with supper 30 tablet 3    TRAZODONE HCL PO Take 25 mg by mouth nightly       metoprolol tartrate (LOPRESSOR) 25 MG tablet Take 1 tablet by mouth 2 times daily 60 tablet 3    acetaminophen (APAP EXTRA STRENGTH) 500 MG tablet Take 2 tablets by mouth every 6 hours as needed for Pain 120 tablet 3    ferrous sulfate (FE TABS) 325 (65 Fe) MG EC tablet Take 1 tablet by mouth 2 times daily           Scheduled Meds:   aspirin  81 mg Oral Daily    amLODIPine  2.5 mg Oral Daily    docusate sodium  100 mg Oral BID    donepezil  5 mg Oral Dinner    ferrous sulfate  325 mg Oral BID    hydrALAZINE  75 mg Oral Q8H    metoprolol tartrate  25 mg Oral BID    QUEtiapine  25 mg Oral BID    senna  2 tablet Oral Nightly    traZODone  25 mg Oral Nightly    sodium chloride flush  10 mL Intravenous 2 times per day    enoxaparin  40 mg Subcutaneous Daily    piperacillin-tazobactam  3,375 mg Intravenous Q8H    ipratropium-albuterol  1 ampule Inhalation Q4H WA    pantoprazole  40 mg Intravenous Daily    And    sodium chloride (PF)  10 mL Intravenous Daily    lidocaine  1 patch Transdermal Daily     Continuous Infusions:   sodium chloride 50 mL/hr at 03/26/21 2009     PRN Meds:HYDROcodone 5 mg - acetaminophen, magnesium sulfate, potassium chloride **OR** potassium alternative oral replacement **OR** potassium chloride, cloNIDine, polyethylene glycol, sodium chloride flush, acetaminophen **OR** acetaminophen, ondansetron        Allergies:  Morphine    OBJECTIVE:    Vitals:   Vitals:    03/28/21 0401   BP: (!) 157/63   Pulse: 86   Resp: 18   Temp: 98.2 °F (36.8 °C)   SpO2: 94%      BMI: Body mass index is 20.32 kg/m². PHYSICAL EXAMINATION:            General appearance:  No apparent distress, appears stated age and cooperative. HEENT:  Normal cephalic, atraumatic without obvious deformity. Pupils equal, round, and reactive to light. Extra ocular muscles intact. Conjunctivae/corneas clear. Neck: Supple   Respiratory: Diminished  air entry bases  Cardiovascular:  Regular rhythm with normal S1/S2,  without rubs or gallops.   Abdomen: Soft, non-tender, non-distended with normal bowel sounds. Musculoskeletal:  No clubbing, cyanosis or edema bilaterally. Neurologic: alert, communicating well , follows commands    Review of Labs and Diagnostic Testing:    No results found for this or any previous visit (from the past 24 hour(s)). Radiology:     Echo Limited    Result Date: 3/25/2021  Transthoracic Echocardiography Report (TTE)  Demographics   Patient Name   Luz Maria Ndiaye  Gender               Female                 KO   MR #           [id number]      Race                                                  Ethnicity   Account #      [de-identified]      Room Number          7730   Accession      [id number]     Date of Study        03/25/2021  Number   Date of Birth  10/03/1931     Referring Physician  Tamiko Roberson MD   Age            80 year(s)     Sonographer          Carolyn Osman RDCS, RVT                                 Interpreting         Alexa Kaplan MD                                Physician  Procedure Type of Study   TTE procedure:ECHOCARDIOGRAM COMPLETE 2D W DOPPLER W COLOR. Procedure Date Date: 03/25/2021 Start: 02:54 PM Study Location: Emergency Room Technical Quality: Adequate visualization Indications:Elevated troponin. Additional Medical History:Sepsis. Patient Status: Routine Height: 68 inches Weight: 140 pounds BSA: 1.76 m^2 BMI: 21.29 kg/m^2 BP: 152/66 mmHg  Conclusions   Summary  Normal left ventricle size and systolic function. Ejection fraction was  estimated at 50 %. There were no regional left ventricular wall motion  abnormalities and wall thickness was within normal limits. The left atrium is Moderately dilated. There is mild-to-moderate aortic stenosis with valve area of 1.3 sq cm. The maximum aortic valve gradient is 23 mmHg, the mean gradient is 11  mmHg, and the peak velocity is 2.4 m/s.   There is a small circumferential pericardial effusion with no evidence of  hemodynamic compromise. Signature   ----------------------------------------------------------------  Electronically signed by Piper Heaton MD (Interpreting  physician) on 03/25/2021 at 06:54 PM  ----------------------------------------------------------------   Findings   Mitral Valve  The mitral valve structure was normal with normal leaflet separation. DOPPLER: The transmitral velocity was within the normal range with no  evidence for mitral stenosis. Mild mitral regurgitation is present. Aortic Valve  Aortic valve appears tricuspid. Aortic valve leaflets are Moderately  calcified. Leaflets exhibited mildly increased thickness and mildly  reduced cuspal separation of the aortic valve. Mild aortic regurgitation  is noted. There is mild-to-moderate aortic stenosis with valve area of 1.3 sq cm. The maximum aortic valve gradient is 23 mmHg, the mean gradient is 11  mmHg, and the peak velocity is 2.4 m/s. Tricuspid Valve  The tricuspid valve structure was normal with normal leaflet separation. DOPPLER: There was no evidence of tricuspid stenosis. Mild to moderate tricuspid regurgitation visualized. Right ventricular systolic pressure measures 40 mmhg. Pulmonic Valve  The pulmonic valve leaflets exhibited normal thickness, no calcification,  and normal cuspal separation. DOPPLER: The transpulmonic velocity was  within the normal range with no evidence for regurgitation. Left Atrium  The left atrium is Moderately dilated. Left Ventricle  Normal left ventricle size and systolic function. Ejection fraction was  estimated at 50 %. There were no regional left ventricular wall motion  abnormalities and wall thickness was within normal limits. Right Atrium  Mildly enlarged right atrium size. Right Ventricle  The right ventricular size was normal with normal systolic function and  wall thickness.    Pericardial Effusion  There is a small circumferential pericardial effusion with no evidence of  hemodynamic compromise. Pleural Effusion  No evidence of pleural effusion. Aorta / Great Vessels  -Aortic root dimension within normal limits.  -The Pulmonary artery is within normal limits. -IVC size is within normal limits with normal respiratory phasic changes.   M-Mode/2D Measurements & Calculations   LV Diastolic   LV Systolic Dimension:    AV Cusp Separation: 1.8 cmLA  Dimension: 4.8 3.6 cm                    Dimension: 4.1 cmAO Root  cm             LV Volume Diastolic: 611  Dimension: 3.4 cmLA Area: 20.8  LV FS:25 %     ml                        cm^2  LV PW          LV Volume Systolic: 63.1  Diastolic: 1   ml  cm             LV EDV/LV EDV Index: 108  Septum         ml/61 m^2LV ESV/LV ESV    RV Diastolic Dimension: 2 cm  Diastolic: 1.4 Index: 18.6 ml/31 m^2  cm             EF Calculated: 49.6 %     LA/Aorta: 1.21                                            LA volume/Index: 60 ml /34m^2                  LVOT: 2 cm  Doppler Measurements & Calculations   MV Peak E-Wave:     AV Peak Velocity: 241    LVOT Peak Velocity: 107 cm/s  96.8 cm/s           cm/s                     LVOT Mean Velocity: 64.6 cm/s  MV Peak A-Wave: 124 AV Peak Gradient: 23.23  LVOT Peak Gradient: 5  cm/s                mmHg                     mmHgLVOT Mean Gradient: 2  MV E/A Ratio: 0.78  AV Mean Velocity: 153    mmHg  MV Peak Gradient:   cm/s  3.75 mmHg           AV Mean Gradient: 11     TV Peak E-Wave: 41.6 cm/s  MV Mean Gradient: 3 mmHg                     TV Peak A-Wave: 73.3 cm/s  mmHg                AV VTI: 46 cm  MV Mean Velocity:   AV Area                  TV Peak Gradient: 0.69 mmHg  77.8 cm/s           (Continuity):1.29 cm^2   TR Velocity:273 cm/s  MV Deceleration                              TR Gradient:29.81 mmHg  Time: 139 msec      LVOT VTI: 18.9 cm        PV Peak Velocity: 104 cm/s  MV P1/2t: 74 msec   AV P1/2t: 420 msec       PV Peak Gradient: 4.33 mmHg  MVA by PHT:2.97     IVRT: 90 msec  cm^2  MV Area                                      [place] ED Velocity: 128 cm/s  (continuity): 2.33  AV DVI (VTI): 0.41AV DVI  cm^2                (Vmax):0.44   MR Velocity: 548  cm/s  [email or web address] [id number]    Ct Abdomen Pelvis Wo Contrast Additional Contrast? None    Result Date: 3/25/2021  PROCEDURE: CT ABDOMEN PELVIS WO CONTRAST CLINICAL INFORMATION: abdominal pain . COMPARISON: 5/13/2019 TECHNIQUE: 2-D multiplanar noncontrast images of the abdomen and pelvis All CT scans at this facility use dose modulation, iterative reconstruction, and/or weight-based dosing when appropriate to reduce radiation dose to as low as reasonably achievable. FINDINGS: Limitations: Solid organ and hollow viscera evaluation limited due to lack of contrast. Lung bases Bilateral pleural effusions. Bibasilar compressive atelectasis from the pleural effusions. Heart size is enlarged. Marked mitral annular calcifications. Coronary artery calcific atherosclerosis. Abdomen and pelvis liver and spleen are within normal limits. Extensive streak artifact due to spinal hardware is present. The pancreas cannot be accurately assessed on this exam appears to be atrophic and I do not identify a large mass. Right kidney is unremarkable. 6 cm cyst upper pole left kidney this is increased in size since the prior exam. 2 additional cysts left kidney 4 cm and 2 cm. Punctate calcification upper pole calyx and vascular calcifications noted left kidney. Heavy calcific atherosclerosis of the aorta Air and fluid-filled loops of small bowel are present. No dilated loops are demonstrated. There is severe colonic diverticulosis no convincing evidence of acute diverticulitis. Evaluation of the pelvis is further limited due to bilateral hip replacements A large fecal bolus in the rectum suggesting fecal impaction.  Bladder cannot be assessed is completely obscured by beam hardening artifacts. The bones Uterus spinal hardware from L1 through L3. Prior sacral plasty. An the previously mentioned bilateral hip replacement. Bones are severely osteopenic. A do not identify any suspicious bone lesions. 1. Bilateral moderate pleural effusions and adjacent atelectasis. 2. Cardiomegaly 3. Fecal impaction. 4. Severe diverticulosis with no convincing evidence for diverticulitis but difficult to exclude based on this exam. **This report has been created using voice recognition software. It may contain minor errors which are inherent in voice recognition technology. ** Final report electronically signed by Dr. Carla Garcia on 3/25/2021 9:00 AM    Xr Chest Portable    Result Date: 3/25/2021  PROCEDURE: XR CHEST PORTABLE CLINICAL INFORMATION: SOB. COMPARISON: Chest x-ray dated 7 June 2018. TECHNIQUE: AP upright view of the chest. FINDINGS: There is moderate cardiomegaly. There is atherosclerotic calcification aortic arch. There is abnormal density in the left retrocardiac region suggestive of infiltrate and possible effusion. . The pulmonary vascularity is slightly increased. There are postoperative changes in the lower thoracic and upper lumbar spine and left shoulder. .     1. Moderate cardiomegaly and increased pulmonary vascularity. 2. Abnormal density left retrocardiac region suggestive of infiltrate and possible effusion. 3. Atherosclerotic calcification mild dilatation of the ascending aorta. 4. Postoperative changes involving the left shoulder, lower thoracic and lumbar spine. . **This report has been created using voice recognition software. It may contain minor errors which are inherent in voice recognition technology. ** Final report electronically signed by DR Uche Washington on 3/25/2021 11:35 AM        ASSESMENT:      Active Problems:    Severe malnutrition ([address]    Sepsis ([address]  Resolved Problems:    * No resolved hospital problems. *  OTHER PROBLEMS:  Aspiration pneumonia  Elevated troponin  S/p nausae/vomiting  S2 fracture    PLAN:  Continue bronchodilators and antibiotics, leucocytosis improved. F/u  CXR   Seen by cardiologist, medical management recommended  Cont B Blockers ASA   PT OT  Pain control.       Dr Robin Mccarty resumes care in am          DVT prophylaxis: [x] Lovenox                                 [] SCDs                                 [] SQ Heparin                                 [] Encourage ambulation, low risk for DVT, no chemical or mechanical prophylaxis necessary              [] Already on Anticoagulation                Anticipated Disposition upon discharge: [] Home                                                                         [] Home with Home Health                                                                         [] [facility]                                                                         [] [facility] [address]          Electronically signed by Anmol Lindquist MD on 3/28/2021 at 7:48 AM

## 2021-03-29 NOTE — CONSULTS
Pain management consult noted. The pain management team was following this patient during her recent hospitalization and she was last seen on 3/11/2021. The pain management team will continue to follow this patient during this hospitalization. Please see daily progress note for updates and changes.      Electronically signed by DIGNA Lewis CNP on 3/29/2021 at 4:03 PM

## 2021-03-29 NOTE — PLAN OF CARE
Problem: Falls - Risk of:  Goal: Will remain free from falls  Description: Will remain free from falls  Outcome: Ongoing  Goal: Absence of physical injury  Description: Absence of physical injury  Outcome: Ongoing     Problem: Skin Integrity:  Goal: Will show no infection signs and symptoms  Description: Will show no infection signs and symptoms  Outcome: Ongoing  Goal: Absence of new skin breakdown  Description: Absence of new skin breakdown  Outcome: Ongoing     Problem: Discharge Planning:  Goal: Discharged to appropriate level of care  Description: Discharged to appropriate level of care  Outcome: Ongoing   . Erin Penaloza Care plan reviewed with patient   . Patient verbalize understanding of the plan of care and contribute to goal setting.

## 2021-03-29 NOTE — PROGRESS NOTES
[address] [place] [address]  Occupational Therapy  Daily Note  Time:    Time In: 5679  Time Out: 2427  Timed Code Treatment Minutes: 16 Minutes  Minutes: 16          Date: 3/29/2021  Patient Name: Emil Canales,   Gender: female      Room: -24/024-A  MRN: [id number]  : 10/3/1931  (80 y.o.)  Referring Practitioner: Dr. Alexa Ngo MD  Diagnosis: Sepsis  Additional Pertinent Hx: Pt presents with nausea and vomiting for the past 24 hours. Patient resides at [facility] and they called the family this morning stating that she had been vomiting through the night and had not eaten anything since yesterday afternoon. Patient is confused but this is baseline for her per family. She was recently admitted to [facility] for surgery due to a fracture of the sacrum. In the emergency department troponin was elevated to 0.019, WBC elevated to 16.9, patient was hypoxic at 90% and RR between 30 and 40. UA was negative for infection but positive for ketones and bilirubin. Chest X Ray showed bilateral pleural effusions. CT abdomen confirmed bilateral pleural effusions, fecal impaction and severe diverticulosis with no convincing evidence for diverticulitis. She denies chest pain or abdominal pain. She continues to be nauseous and have no appetite but no more emesis since coming to the ED. Restrictions/Precautions:  Restrictions/Precautions: Fall Risk, General Precautions  Position Activity Restriction  Other position/activity restrictions: Hx of pelvic fx 3 wks ago; uses a pocket talker; skin protection- pt with pressure sores on her coccyx and buttocks      SUBJECTIVE: Pt sitting up in recliner with eyes closed upon arrival of therapist. Pt required encouragement to participate.      PAIN: no number given/10: pain in bottom with mobility    Vitals: Vitals not assessed per clinical judgement, see nursing flowsheet    COGNITION: Slow Processing, Decreased Insight, Decreased Problem Solving and Decreased Safety Awareness    ADL:   Grooming: with set-up. for using swab in mouth-Pt reporting her mouth was sore/dry during session  Lower Extremity Dressing: Dependent. Marina Barnett BALANCE:  Sitting Balance:  Contact Guard Assistance. Standing Balance: Moderate Assistance. BED MOBILITY:  Sit to Supine: Moderate Assistance      TRANSFERS:  Sit to Stand: Moderate Assistance, X 1. from recliner   Stand-pivot: max A-dependent (LE buckling) Pt squeezing therapist's UE & scratching during t/f      ASSESSMENT:     Activity Tolerance:  Patient tolerance of  treatment: poor. Discharge Recommendations: [address], Patient would benefit from continued therapy after discharge    Equipment Recommendations: Equipment Needed: No  Other: Will further assess  Plan: Times per week: 3-5x  Specific instructions for Next Treatment: Functional mobility when able; ADLs while upright; pain management techniques  Current Treatment Recommendations: Balance Training, Endurance Training, Functional Mobility Training, Self-Care / ADL, Cognitive Reorientation, Strengthening, Pain Management  Plan Comment: Pt would benefit from continued skilled OT services as she can tolerate while at SNF. Patient Education  Patient Education: see above    Goals  Short term goals  Time Frame for Short term goals: By discharge  Short term goal 1: Pt will demonstrate functional transfers with OTR to prepare for doing self care while out of bed. MET, REVISE  Short term goal 2: Pt will participate in upper body ADLs while in an upright position with setup A to increase her independence with self care. NOT MET, CONTINUE  Short term goal 3: Pt will complete BUE light or moderate resistance exercises with min cues for slow breathing to increase her endurance and strength for ease of doing self care.   NOT MET, CONTINUE  Short term goal 4: Pt will engage in relaxed breathing and keeping her eyes open while doing any bed mobility or activities in sitting position to increase her pain tolerance and ability to participate. NOT MET, REVISE    Revised Short-Term Goals  Short term goals  Time Frame for Short term goals: By discharge  Short term goal 1: Pt will complete stand-pivot t/fs with 0>mod A x 1 for increased ease of BSC t/fs  Short term goal 2: Pt will participate in upper body ADLs while in an upright position with setup A to increase her independence with self care. Short term goal 3: Pt will complete BUE light or moderate resistance exercises with min cues for slow breathing to increase her endurance and strength for ease of doing self care. Short term goal 4: Pt will engage in relaxed breathing and keeping her eyes open while doing any bed mobility or activities in sitting position to increase her pain tolerance and ability to participate. Following session, patient left in safe position with all fall risk precautions in place.

## 2021-03-29 NOTE — PROGRESS NOTES
goals  Time Frame for Short term goals: at discharge  Short term goal 1: Pt to be Supervision for supine <> sit to get in/out of bed  Short term goal 2: Pt to be SBA for sit <> stand to get up to ambulate  Short term goal 3: Pt to ambulate > 15 ft with RW with CGA to get to bathroom  Long term goals  Time Frame for Long term goals : not set due to short ELOS    Following session, patient left in safe position with all fall risk precautions in place. Magy Collar.  Carlie Bowling, [address]

## 2021-03-29 NOTE — PROGRESS NOTES
Cardiology Progress Note      Patient:  Merissa Gipson  YOB: 1931  MRN: [id number]   Acct: [de-identified]  Admit Date:  3/25/2021  Primary Cardiologist: none  REASON FOR CONSULTATION:  Elevated troponin in an 71-year-old female  admitted after recurrent nausea, vomiting and with history of recent  fall from extended care facility and under treatment now for sepsis and  possible aspiration pneumonia.     HISTORY OF PRESENT ILLNESS:  This is an 71-year-old  female  patient with history of hypertension, numerous orthopedic surgeries, and  dementia presented from extended care facility with nausea, vomiting  over the 24 hours prior to admission. The patient resides at [facility]  and family was notified. The day of admission, the episode of nausea  and vomiting throughout the night, and the patient did not eat almost  for 24 hours and so, the patient was confused, that is reported to be  baseline per the documentation here and per the family record. She has  been recently admitted to [facility] for surgery due to sacral fracture of  the sacrum. So in the emergency department, the patient reported chest  pain free, no chest pain, no history of chest pain and now the patient  report no history of chest pain, but the troponin was elevated to 0.019. White blood cell elevated. She was hypoxic, saturation 99%. She was  tachypneic. Respiratory rate was 30 to 40. Urinalysis was negative for  any infectious process and chest x-ray showed bilateral pleural  effusion, some infiltrate and CT confirmed bilateral pleural effusion,  pericardial infarction, diverticulosis without evidence of  diverticulitis, but denied any chest pain or abdominal pain except  nausea and vomiting with no appetite. So, she is under treatment for  sepsis and with possible aspiration pneumonia. It is reported that she  is feeling a little better and no nausea, vomiting after admission at  least in the last 24 hours. Cardiology evaluation was sought in view of  the mild elevation of troponin. The patient is a little confused still,  but no chest pain. Denied any form of chest pain. She has a back pain  that she is complaining, low back pain. \"    Note per dr Torrey Long \"    Subjective (Events in last 24 hours): pt confused at her baseline, has pain in sacral area since her fall and fractures, and mouth is dry and wants a wet washcloth.   Poor historian, no report of cp or sob  On RA      Objective:   BP (!) 152/70   Pulse 64   Temp 98.5 °F (36.9 °C) (Oral)   Resp 16   Ht 5' 9\" (1.753 m)   Wt 137 lb 9.6 oz (62.4 kg)   SpO2 94%   BMI 20.32 kg/m²        TELEMETRY: nsr    Physical Exam:  General Appearance: awake, frustrated and wants wet washcloth now  Cardiovascular: normal rate, regular rhythm, normal S1 and S2, no murmurs, rubs, clicks, or gallops, distal pulses intact, no carotid bruits, no JVD  Pulmonary/Chest: clear to auscultation bilaterally- no wheezes, rales or rhonchi, normal air movement, no respiratory distress  Abdomen: soft, non-tender, non-distended, normal bowel sounds, no masses Extremities: no cyanosis, clubbing or edema, pulse   Skin: warm and dry, no rash or erythema  Head: normocephalic and atraumatic  Eyes: pupils equal, round, and reactive to light  Neck: supple and non-tender without mass, no thyromegaly     Medications:    amoxicillin-clavulanate  1 tablet Oral 2 times per day    [START ON 3/30/2021] pantoprazole  40 mg Oral QAM AC    aspirin  81 mg Oral Daily    amLODIPine  2.5 mg Oral Daily    docusate sodium  100 mg Oral BID    donepezil  5 mg Oral Dinner    ferrous sulfate  325 mg Oral BID    hydrALAZINE  75 mg Oral Q8H    metoprolol tartrate  25 mg Oral BID    QUEtiapine  25 mg Oral BID    senna  2 tablet Oral Nightly    traZODone  25 mg Oral Nightly    sodium chloride flush  10 mL Intravenous 2 times per day    enoxaparin  40 mg Subcutaneous Daily    ipratropium-albuterol  1 ampule Inhalation Q4H WA    lidocaine  1 patch Transdermal Daily       HYDROcodone 5 mg - acetaminophen, 1 tablet, Q6H PRN  magnesium sulfate, 2,000 mg, PRN  potassium chloride, 40 mEq, PRN    Or  potassium alternative oral replacement, 40 mEq, PRN    Or  potassium chloride, 10 mEq, PRN  cloNIDine, 0.1 mg, Q8H PRN  polyethylene glycol, 17 g, Daily PRN  sodium chloride flush, 10 mL, PRN  acetaminophen, 650 mg, Q6H PRN    Or  acetaminophen, 650 mg, Q6H PRN  ondansetron, 4 mg, Q6H PRN        Diagnostics:  TTE 3/25/21   Summary   Normal left ventricle size and systolic function. Ejection fraction was   estimated at 50 %. There were no regional left ventricular wall motion   abnormalities and wall thickness was within normal limits. The left atrium is Moderately dilated. There is mild-to-moderate aortic stenosis with valve area of 1.3 sq cm. The maximum aortic valve gradient is 23 mmHg, the mean gradient is 11   mmHg, and the peak velocity is 2.4 m/s. There is a small circumferential pericardial effusion with no evidence of   hemodynamic compromise. Signature      ----------------------------------------------------------------   Electronically signed by Dayana Garcia MD (Interpreting   physician) on 03/25/2021 at 06:54 PM    Lab Data:    Cardiac Enzymes:  No results for input(s): CKTOTAL, CKMB, CKMBINDEX, TROPONINI in the last 72 hours.     CBC:   Lab Results   Component Value Date    WBC 9.4 03/27/2021    RBC 3.41 03/27/2021    RBC 4.19 03/25/2012    HGB 10.7 03/27/2021    HCT 33.4 03/27/2021     03/27/2021       CMP:    Lab Results   Component Value Date     03/27/2021    K 3.8 03/28/2021    K 4.3 03/06/2021     03/27/2021    CO2 19 03/27/2021    BUN 6 03/27/2021    CREATININE 0.4 03/27/2021    LABGLOM >90 03/27/2021    GLUCOSE 75 03/27/2021    GLUCOSE 97 09/30/2011    CALCIUM 8.3 03/27/2021       Hepatic Function Panel:    Lab Results   Component Value Date    ALKPHOS 125 03/25/2021    ALT 10 03/25/2021    AST 18 03/25/2021    PROT 6.6 03/25/2021    BILITOT 0.5 03/25/2021    BILIDIR <0.2 03/25/2021    LABALBU 2.6 03/25/2021    LABALBU 4.4 09/30/2011       Magnesium:    Lab Results   Component Value Date    MG 2.1 03/28/2021       PT/INR:    Lab Results   Component Value Date    INR 0.90 05/13/2019       HgBA1c:  No results found for: LABA1C    FLP:    Lab Results   Component Value Date    TRIG 62 03/26/2021    HDL 32 03/26/2021    LDLCALC 63 03/26/2021       TSH:    Lab Results   Component Value Date    TSH 1.890 03/10/2021         Assessment:    Nausea/vomiting  sepsis  ?asp PNA  Leukocytosis - improved  Elevated troponins - likely related to above, denies chest pain  Mild to mod AS  Ef 50 per TTE 3/25/21  Hx HTN  Dementia  Recent fall and bilateral sacral fractures - s/p OR  DNR-CCA      Plan:    ATB per attending  Keep mag >2 and K >4  Cont asa/BB  Recommend OMT  Will see prn  Call with any questions         Electronically signed by Gene Ingram PA-C on 3/29/2021 at 10:40 AM

## 2021-03-29 NOTE — FLOWSHEET NOTE
03/29/21 1004   Encounter Summary   Services provided to: Patient and family together   Referral/Consult From: Kiko   Continue Visiting Yes  (3/29)   Complexity of Encounter Moderate   Length of Encounter 15 minutes   Routine   Type Initial   Assessment Sleeping   Intervention Prayer   During my encounter with the 80 yr old patient, I attempted to visit with the pt on 3B. The patient appears to be resting now and I didnt want to disturb the patient. The pt was admitted due to sepsis. I or another Sisi Soto will attempt to visit the patient or the family at another time.

## 2021-03-29 NOTE — PROGRESS NOTES
Pain Managment   Progress Note      3/29/2021 3:46 PM     · SUBJECTIVE:    · Chief complaint: Low back, Sacral pain   · Pain management consult noted. Patient came in from [facility] for chief complaint of fatigue, nausea and emesis. Has not had any emesis since admission. Patient is POD # 18 from s/p bilateral Sacroplasty completed 3/11/2021. Sutures from procedure were still in place and her appointment for suture removal scheduled with Dr. Tg Roche from 3/24/2021 with Dr. Tg Roche was canceled d/t admission. · Patients seen in her room resting in her bed. Sleepy from prn [place]. She continues to have complaints of a lot of low back and sacral pain. · Today I removed sacroplasty sutures with assistance from RN. Patient tolerated will but both sites have ulcers present. I ordered Bacitracin to sites. No signs of infection at these sites. Wound care was applied.    · Has used 3 tabs of Norco in the past 24 hours   · Pain rating is none at rest and increases to 10/10 with movement in low back and sacral area   · Constipation: + BM 3/9/2021    Current medications:   Current Facility-Administered Medications   Medication Dose Route Frequency Provider Last Rate Last Admin    amoxicillin-clavulanate (AUGMENTIN) 875-125 MG per tablet 1 tablet  1 tablet Oral Q12H De Ohara MD   1 tablet at 03/29/21 1356    [START ON 3/30/2021] pantoprazole (PROTONIX) tablet 40 mg  40 mg Oral QAM AC De Oahra MD        [START ON 3/30/2021] lidocaine 4 % external patch 3 patch  3 patch Transdermal Daily DIGNA Lagos - CNP        acetaminophen (TYLENOL) tablet 650 mg  650 mg Oral Q4H PRN Karen Garcia APRN - CNP        HYDROcodone-acetaminophen (NORCO) 5-325 MG per tablet 0.5 tablet  0.5 tablet Oral Q4H PRN Karen Garcia APRN - CNP        Or    HYDROcodone-acetaminophen (NORCO) 5-325 MG per tablet 1 tablet  1 tablet Oral Q4H PRN Karen Garcia APRN - CNP        neomycin-bacitracin-polymyxin (NEOSPORIN) ointment   Topical TID DIGNA Talamantes CNP        magnesium sulfate 2000 mg in 50 mL IVPB premix  2,000 mg Intravenous PRN Hollie Becker MD        potassium chloride (KLOR-CON M) extended release tablet 40 mEq  40 mEq Oral PRN Hollie Becker MD        Or    potassium bicarb-citric acid (EFFER-K) effervescent tablet 40 mEq  40 mEq Oral PRN Hollie Becker MD   20 mEq at 03/27/21 1543    Or    potassium chloride 10 mEq/100 mL IVPB (Peripheral Line)  10 mEq Intravenous PRN Hollie Becker MD        aspirin EC tablet 81 mg  81 mg Oral Daily Malinda Lopez MD   81 mg at 03/29/21 0951    amLODIPine (NORVASC) tablet 2.5 mg  2.5 mg Oral Daily Malinda Lopez MD   2.5 mg at 03/29/21 0951    cloNIDine (CATAPRES) tablet 0.1 mg  0.1 mg Oral Q8H PRN DIGNA Brady CNP   0.1 mg at 03/27/21 1539    docusate sodium (COLACE) capsule 100 mg  100 mg Oral BID Malinda Lopez MD   100 mg at 03/29/21 0950    donepezil (ARICEPT) tablet 5 mg  5 mg Oral Dinner Malinda Lopez MD   5 mg at 03/28/21 1726    ferrous sulfate (IRON 325) tablet 325 mg  325 mg Oral BID Malinda Lopez MD   325 mg at 03/29/21 0950    hydrALAZINE (APRESOLINE) tablet 75 mg  75 mg Oral Q8H Malinda Lopez MD   75 mg at 03/29/21 1356    metoprolol tartrate (LOPRESSOR) tablet 25 mg  25 mg Oral BID Malinda Lopez MD   25 mg at 03/29/21 0950    polyethylene glycol (GLYCOLAX) packet 17 g  17 g Oral Daily PRN Malinda Lopez MD   17 g at 03/27/21 0919    QUEtiapine (SEROQUEL) tablet 25 mg  25 mg Oral BID Malinda Lopez MD   25 mg at 03/29/21 0950    senna (SENOKOT) tablet 17.2 mg  2 tablet Oral Nightly Malinda Lopez MD   17.2 mg at 03/28/21 2030    traZODone (DESYREL) tablet 25 mg  25 mg Oral Nightly Malinda Lopez MD   25 mg at 03/28/21 2029    sodium chloride flush 0.9 % injection 10 mL  10 mL Intravenous 2 times per day Melo Reed, APRN - CNP   10 mL at 03/28/21 2031    sodium chloride flush 0.9 % injection 10 mL  10 mL Intravenous PRN Johann November, APRN - CNP        enoxaparin (LOVENOX) injection 40 mg  40 mg Subcutaneous Daily Johann November, APRN - CNP   40 mg at 03/28/21 1606    ondansetron (ZOFRAN) injection 4 mg  4 mg Intravenous Q6H PRN Johann November, APRN - CNP        ipratropium-albuterol (DUONEB) nebulizer solution 1 ampule  1 ampule Inhalation Q4H WA Johann November, APRN - CNP   1 ampule at 03/29/21 1524           REVIEW OF SYSTEMS:  CONSTITUTIONAL: fatigued, sleepy  EYES:  negative  HEENT:  [place] of hearing  RESPIRATORY:  negative  CARDIOVASCULAR:  negative  GASTROINTESTINAL: + BM 3/29/2021  GENITOURINARY:  Shaikh, neurogenic bladder  SKIN:  coccyx ulcer, small ulcers at bilateral sacroplasty sites   HEMATOLOGIC/LYMPHATIC:  negative  MUSCULOSKELETAL:  positive for  myalgias, arthralgias, bone pain and low back pain   NEUROLOGICAL:  positive for weakness  BEHAVIOR/PSYCH:  confusion  System review otherwise negative       PHYSICAL EXAM:  BP (!) 154/67   Pulse 62   Temp 98 °F (36.7 °C) (Oral)   Resp 16   Ht 5' 9\" (1.753 m)   Wt 137 lb 9.6 oz (62.4 kg)   SpO2 94%   BMI 20.32 kg/m²  I Body mass index is 20.32 kg/m².  I   Wt Readings from Last 1 Encounters:   03/28/21 137 lb 9.6 oz (62.4 kg)      Sleepy and fatigued   Orientation:   person  Mood: within normal limits  Affect: anxious  General appearance: Mild distress, appearing stated age     Memory:  decraesed thought processing   Attention/Concentration: decreased  Language:  normal     Cranial Nerves:  cranial nerves II-XII are grossly intact  ROM:  Decraesed ROM d/t low back and sacral pain, normal ROM in all 4 extremities   Motor Exam:  Motor exam is symmetrical 5 out of 5 all extremities bilaterally  + lumbar facet loading and tenderness, + sacral tenderness   Tone:  normal  Muscle bulk: within normal limits  Sensory:  Sensory intact     Heart: chrissy rate, regular rhythm, normal S1, S2, no murmurs, rubs, clicks or gallops  Lungs: clear to auscultation without wheezes or rales  Abdomen: soft, non-tender, non-distended, normal bowel sounds, no masses or organomegaly     Skin: coccyx ulcer, small ulcers and redness at bilateral sacroplasty sites (situres removed today)   Peripheral vascular: Pulses: Normal upper and lower extremity pulses; Edema: no    DATA    Recent Labs     03/27/21  0428   WBC 9.4   RBC 3.41*   HGB 10.7*   HCT 33.4*   MCV 97.9   MCH 31.4   MCHC 32.0*      MPV 9.6     Recent Labs     03/27/21  0719 03/28/21  0823     --    K 3.6 3.8     --    CO2 19*  --    BUN 6*  --    CREATININE 0.4  --    GLUCOSE 75  --    CALCIUM 8.3*  --      No results for input(s): POCGLU in the last 72 hours. ASSESSMENT   1. Acute pain from fall and injury   2. Sacral fracture with intractable pain s/p bilateral sacroplasty from 3/11/2021  3. Osteoporosis with current pathologic fracture   4. History of chronic thoracic and lumbar fractures   5. Sepsis   6. Debility       PLAN  1. Continue pain management   2. Sacroplasty sutures removed at side of bed with assistance from RN. Sites with small ulcers and redness present- bacitracin antibiotic ordered to sites TID. 3. Continue tylenol but changed to 650 mg every 4 hours as needed for mild pain of 1-3/10. Changed Norco to 5/325 mg tabs, 1/2 tab to 1 tab every 4 hours as needed for moderate to severe breakthrough pain of 4-10/10. (the Norco may contribute to patients fatigue)   4. Increased Lidoderm patches to 3 patches daily to painful areas   5. Continue bowel program   6. Ordered Palliative care consult to help with comfort  7. Continue therapies   8.  Will continue to follow        Spent 36 minutes evaluating and examining patient and completing documentation      DIGNA Grace CNP, 3/29/2021, 3:46 PM

## 2021-03-30 NOTE — PROGRESS NOTES
Consult received and chart reviewed. Pain management concerns discussed with Dr. Getachew Montanez who gave recommendations.   Discussed with Lady Andrade CNP with pain management team.

## 2021-03-30 NOTE — PROGRESS NOTES
Patient is alert and oriented to person place and time, Pt denies pain at this time, states it doesn't hurt if she doesn't move. Pt mucus membranes are moist. Pt speech is clear. Hand grasp is equal and strong bilaterally. Pt has a INT to right wrist. Lung sounds are diminished. Bowel sounds are active in all 4 quadrants. Shaikh catheter in place draining clear yellow urine. Pt denies any pain or tenderness. No swelling noted bilaterally to legs, pedal pull and push equal and strong bilaterally. Pedal pulses strong. Pt does have 3 small pressure ulcers to the coccyx and sacral area. Pt bed in lowest position, call light within reach.

## 2021-03-30 NOTE — PROGRESS NOTES
Comprehensive Nutrition Assessment    Type and Reason for Visit:  Reassess    Nutrition Recommendations/Plan:   Consider MVI and appetite stimulant. Trial ONS: Ensure Compact BID. Continue Ensure Clear once daily. Continue current diet. Nutrition Assessment:    Pt. With no improvement from nutritional standpoint AEB intake 1-25% meals and patient reports poor appetite continues. At risk for further nutritional compromise r/t severe malnutrition, back pain, altered GI function on admit (N/V), increased nutrient needs for wound healing, advanced age, dementia and underlying medical condition (hx breast cancer, CHF, CKD, diverticulosis, GERD, hiatal hernia). Nutrition recommendations/interventions as per above. Malnutrition Assessment:  Malnutrition Status:  Severe malnutrition    Context:  Chronic Illness     Findings of the 6 clinical characteristics of malnutrition:  Energy Intake:  7 - 75% or less estimated energy requirements for 1 month or longer  Weight Loss:  (-20.7% reported since last year)     Body Fat Loss:  7 - Severe body fat loss Orbital, Fat Overlying Ribs   Muscle Mass Loss:  1 - Mild muscle mass loss(moderate) Clavicles (pectoralis & deltoids)  Fluid Accumulation:  Unable to assess     Strength:  Not Performed    Estimated Daily Nutrient Needs:  Energy (kcal):  5987-7417 kcals (28-32); Weight Used for Energy Requirements:  (61 kgm 3/26)     Protein (g):  85+ grams (1.4+);  Weight Used for Protein Requirements:  (61 kgm)          Nutrition Related Findings:  Patient seen with student nurse and nurse, reports of pain and staff applying lidoderm patches, very hard of hearing, has headphone with microphone, reports poor appetite continues and in part due to pain issues, drinking some of the Ensure Clear, note pain management consulted; intake 1-25% meals and ONS per graphics; BM x 2 past 24 hours; glucose 83, BUN 6, Creatinine 0.4; antibiotic, colace, iron, senokot      Wounds:  Skin Tears(stage II-coccyx; incision-sacrum)       Current Nutrition Therapies:    DIET GENERAL;  Dietary Nutrition Supplements: Clear Liquid Oral Supplement  Dietary Nutrition Supplements: Low Volume Supplement    Anthropometric Measures:  · Height: 5' 9\" (175.3 cm)  · Current Body Weight: 129 lb 12.8 oz (58.9 kg)(3/30; no edema)   · Admission Body Weight: 134 lb 12.8 oz (61.1 kg)(3/26 no edema)    · Usual Body Weight: (per family 170# last year; per EMR; 3/6/21: 139# 3.2 oz)     · Ideal Body Weight: 145 lbs;   · BMI: 19.2  · BMI Categories: Underweight (BMI less than 22) age over 72       Nutrition Diagnosis:   · Severe malnutrition related to inadequate protein-energy intake, altered GI function as evidenced by moderate loss of subcutaneous fat, moderate muscle loss      Nutrition Interventions:   Food and/or Nutrient Delivery:  Continue Current Diet, Modify Oral Nutrition Supplement  Nutrition Education/Counseling:  Education not appropriate   Coordination of Nutrition Care:  Continue to monitor while inpatient    Goals:  Patient will consume 75% or more of meals to aid in wound healing during LOS. Nutrition Monitoring and Evaluation:   Behavioral-Environmental Outcomes:  None Identified   Food/Nutrient Intake Outcomes:  Food and Nutrient Intake, Supplement Intake  Physical Signs/Symptoms Outcomes:  Biochemical Data, Chewing or Swallowing, GI Status, Nausea or Vomiting, Fluid Status or Edema, Nutrition Focused Physical Findings, Skin, Weight     Discharge Planning:     Too soon to determine     Electronically signed by Kizzy Im, RD, LD on 3/30/21 at 12:15 PM EDT    Contact: [phone]

## 2021-03-30 NOTE — PROGRESS NOTES
IM Progress Note  Dr. Larry Temple  Late entry  3/30/2021 10:40 AM      Patient name Enrique Steiner  [id number]  PCP: Meka Castano MD  Admit Date: 3/25/2021  Acct No. [de-identified]    Subjective: Interval History:   Pt was in chair  C/o significant pain   No cp        Diet: Dietary Nutrition Supplements: Clear Liquid Oral Supplement  DIET GENERAL;    I/O last 3 completed shifts: In: 453 [P.O.:200; I.V.:253]  Out: 1075 [Urine:1075]  No intake/output data recorded. Admission weight: 140 lb (63.5 kg) as of 3/25/2021  7:17 AM  Wt Readings from Last 3 Encounters:   03/30/21 129 lb 12.8 oz (58.9 kg)   03/13/21 131 lb 6.4 oz (59.6 kg)   05/14/19 168 lb (76.2 kg)     Body mass index is 19.17 kg/m². ROS   CVS;  no cp or palpitation  Resp: no SOB or cough  Neuro:  No numbness or weakness or dizziness  Abd: no nausea or vomiting,c/o generalized pain      Medications:   Scheduled Meds:   amoxicillin-clavulanate  1 tablet Oral Q12H    pantoprazole  40 mg Oral QAM AC    lidocaine  3 patch Transdermal Daily    neomycin-bacitracin-polymyxin   Topical TID    aspirin  81 mg Oral Daily    amLODIPine  2.5 mg Oral Daily    docusate sodium  100 mg Oral BID    donepezil  5 mg Oral Dinner    ferrous sulfate  325 mg Oral BID    hydrALAZINE  75 mg Oral Q8H    metoprolol tartrate  25 mg Oral BID    QUEtiapine  25 mg Oral BID    senna  2 tablet Oral Nightly    traZODone  25 mg Oral Nightly    sodium chloride flush  10 mL Intravenous 2 times per day    enoxaparin  40 mg Subcutaneous Daily    ipratropium-albuterol  1 ampule Inhalation Q4H WA     Continuous Infusions:      Labs :     CBC:   No results for input(s): WBC, HGB, PLT in the last 72 hours.   BMP:    Recent Labs     03/28/21  0823 03/30/21  0349   NA  --  134*   K 3.8 3.6   CL  --  104   CO2  --  20*   BUN  --  6*   CREATININE  --  0.4   GLUCOSE  --  83     Hepatic:   No results for input(s): AST, ALT, ALB, BILITOT, ALKPHOS in the last 72 hours.  Troponin: No results for input(s): TROPONINI in the last 72 hours. BNP: No results for input(s): BNP in the last 72 hours. Lipids: No results for input(s): CHOL, HDL in the last 72 hours. Invalid input(s): LDLCALCU  INR: No results for input(s): INR in the last 72 hours.     Radiology    Objective:   Vitals: BP (!) 166/66   Pulse 74   Temp 98.8 °F (37.1 °C) (Oral)   Resp 22   Ht 5' 9\" (1.753 m)   Wt 129 lb 12.8 oz (58.9 kg)   SpO2 95%   BMI 19.17 kg/m²   HEENT: Head:pupils react  Neck: supple  Lungs: clear to auscultation  Heart: regular rate and rhythm   Abdomen: soft BS heard   Extremities: warm  edema  Neurologic:  Asleep did respond to name    Impression:   :   Possible aspiration pneumonia with sepsis  Recent sacral fracture with fall  N/V improved  LBBB not new  HTN  Breast Cancer history s/p mstectomy  GERD  Dementia  Elevated trop  Leucocytosis improved  Mild to mod Aortic stenosis valve 1.3sq cm          Plan:    Switch to ooral antibiotics and cont bronchodilators   Pain management consult   Cont PT OT as tolerated   Cont B Blockers JULIANNE Ohara MD
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
## 2021-03-30 NOTE — PROGRESS NOTES
Pt is laying in bed with eyes closed, pts family is at bedside. Pt states that she has a 10/10 pain still in her lower back. Pt was given Norco 5 mg with little relief. Lung sounds are clear.

## 2021-03-30 NOTE — CARE COORDINATION
3/30/21, 12:02 PM EDT    DISCHARGE PLANNING EVALUATION  Spoke with Vannessa Hand at [facility]. She reports patients precert has been approved. Spoke with Dr. Gely Robert and she reports that the patient is not ready today, hopefully she will be ready tomorrow. Called vanessa Deras to update him on potential discharge for tomorrow. Called Mirna back from [facility] and she reports that the precert will be good for 48 hours.

## 2021-03-30 NOTE — PROGRESS NOTES
Nadir Infante MD        potassium chloride (KLOR-CON M) extended release tablet 40 mEq  40 mEq Oral PRN Nadir Infante MD        Or    potassium bicarb-citric acid (EFFER-K) effervescent tablet 40 mEq  40 mEq Oral PRN Nadir Infante MD   20 mEq at 03/27/21 1543    Or    potassium chloride 10 mEq/100 mL IVPB (Peripheral Line)  10 mEq Intravenous PRN Nadir Infante MD        aspirin EC tablet 81 mg  81 mg Oral Daily Surendra Oglesby MD   81 mg at 03/30/21 0931    amLODIPine (NORVASC) tablet 2.5 mg  2.5 mg Oral Daily Surendra Oglesby MD   2.5 mg at 03/30/21 0931    cloNIDine (CATAPRES) tablet 0.1 mg  0.1 mg Oral Q8H PRN DIGNA Vásquez CNP   0.1 mg at 03/27/21 1539    docusate sodium (COLACE) capsule 100 mg  100 mg Oral BID Surendra Oglesby MD   100 mg at 03/30/21 0932    donepezil (ARICEPT) tablet 5 mg  5 mg Oral Dinner Surendra Oglesby MD   5 mg at 03/29/21 1755    ferrous sulfate (IRON 325) tablet 325 mg  325 mg Oral BID Surendra Oglesby MD   325 mg at 03/30/21 0931    hydrALAZINE (APRESOLINE) tablet 75 mg  75 mg Oral Q8H Surendra Oglesby MD   75 mg at 03/30/21 1322    metoprolol tartrate (LOPRESSOR) tablet 25 mg  25 mg Oral BID Surendra Oglesby MD   25 mg at 03/30/21 0931    polyethylene glycol (GLYCOLAX) packet 17 g  17 g Oral Daily PRN Surendra Oglesby MD   17 g at 03/27/21 0919    senna (SENOKOT) tablet 17.2 mg  2 tablet Oral Nightly Surendra Oglesby MD   17.2 mg at 03/29/21 2101    sodium chloride flush 0.9 % injection 10 mL  10 mL Intravenous 2 times per day DIGNA Vásquez CNP   10 mL at 03/30/21 1332    sodium chloride flush 0.9 % injection 10 mL  10 mL Intravenous PRN DIGNA Vásquez CNP   10 mL at 03/30/21 1324    enoxaparin (LOVENOX) injection 40 mg  40 mg Subcutaneous Daily DIGNA Vásquez CNP   40 mg at 03/29/21 8073    ondansetron (ZOFRAN) injection 4 mg  4 mg Intravenous Q6H PRN DIGNA Vásquez CNP  ipratropium-albuterol (DUONEB) nebulizer solution 1 ampule  1 ampule Inhalation Q4H WA Kelly Jaimes, APRN - CNP   1 ampule at 03/30/21 1153           REVIEW OF SYSTEMS:  CONSTITUTIONAL: fatigued  EYES:  negative  HEENT:  [place] of hearing  RESPIRATORY:  negative  CARDIOVASCULAR:  negative  GASTROINTESTINAL: + BM 3/29/2021  GENITOURINARY:  Shaikh, neurogenic bladder  SKIN:  coccyx ulcer, small ulcers at bilateral sacroplasty sites   HEMATOLOGIC/LYMPHATIC:  negative  MUSCULOSKELETAL:  positive for  myalgias, arthralgias, bone pain and low back pain   NEUROLOGICAL:  positive for weakness  BEHAVIOR/PSYCH:  confusion  System review otherwise negative       PHYSICAL EXAM:  BP (!) 125/45   Pulse 60   Temp 98.1 °F (36.7 °C) (Oral)   Resp 20   Ht 5' 9\" (1.753 m)   Wt 129 lb 12.8 oz (58.9 kg)   SpO2 95%   BMI 19.17 kg/m²  I Body mass index is 19.17 kg/m².  I   Wt Readings from Last 1 Encounters:   03/30/21 129 lb 12.8 oz (58.9 kg)      Sleepy and fatigued   Orientation:   person  Mood: within normal limits  Affect: anxious  General appearance: Mild distress, appearing stated age     Memory:  decraesed thought processing   Attention/Concentration: decreased  Language:  normal     Cranial Nerves:  cranial nerves II-XII are grossly intact  ROM:  Decraesed ROM d/t low back and sacral pain, normal ROM in all 4 extremities   Motor Exam:  Motor exam is symmetrical 5 out of 5 all extremities bilaterally  + lumbar facet loading and tenderness, + sacral tenderness   Tone:  normal  Muscle bulk: within normal limits  Sensory:  Sensory intact     Heart: chrissy rate, regular rhythm, normal S1, S2, no murmurs, rubs, clicks or gallops  Lungs: clear to auscultation without wheezes or rales  Abdomen: soft, non-tender, non-distended, normal bowel sounds, no masses or organomegaly     Skin: coccyx ulcer, small ulcers and redness at bilateral sacroplasty sites   Peripheral vascular: Pulses: Normal upper and lower extremity pulses; Edema: no    DATA    No results for input(s): WBC, RBC, HGB, HCT, MCV, MCH, MCHC, RDW, PLT, MPV in the last 72 hours. Recent Labs     03/28/21  0823 03/30/21  0349   NA  --  134*   K 3.8 3.6   CL  --  104   CO2  --  20*   BUN  --  6*   CREATININE  --  0.4   GLUCOSE  --  83   CALCIUM  --  8.0*     No results for input(s): POCGLU in the last 72 hours. ASSESSMENT   1. Acute pain from fall and injury   2. Sacral fracture with intractable pain s/p bilateral sacroplasty from 3/11/2021  3. Osteoporosis with current pathologic fracture   4. History of chronic thoracic and lumbar fractures   5. Sepsis   6. Debility       PLAN  1. Continue pain management   2. Discontinued Norco as it was not covering pain. Received recommendations from palliative care which I appreciate the assistance and started Tylenol 650 mg scheduled every 6 hours, started oxy IR 5 mg tabs, 2.5 mg to 5 mg every 4 hours as needed for moderate to severe breakthrough pain of 4-10/10. Chaned Changed Seroquel to nightly and Trazodone nightly prn to decrease drowsiness. 3. Continue Lidoderm patches to 3 patches daily to painful areas   4. Continue bowel program   5. Continue therapies   6. I do not recommend a fentanyl patch d/t her size and most likely not having enough subcutaneous fat to absorb and d/t her tolerance of pain medications as a long acting would stay in her system much longer. 7. Will continue to follow.  I can adjust medications and increase as needed      Spent 27 minutes evaluating and examining patient and completing documentation      DIGNA Shah CNP, 3/30/2021, 3:13 PM

## 2021-03-30 NOTE — PROGRESS NOTES
[address] [place] [address]  Occupational Therapy  Daily Note  Time:   Time In: 820  Time Out: 1678  Timed Code Treatment Minutes: 23 Minutes  Minutes: 23          Date: 3/30/2021  Patient Name: Lee Suh,   Gender: female      Room: -24/024-A  MRN: [id number]  : 10/3/1931  (80 y.o.)  Referring Practitioner: Dr. Lilia Rivers MD  Diagnosis: Sepsis  Additional Pertinent Hx: Pt presents with nausea and vomiting for the past 24 hours. Patient resides at [facility] and they called the family this morning stating that she had been vomiting through the night and had not eaten anything since yesterday afternoon. Patient is confused but this is baseline for her per family. She was recently admitted to [facility] for surgery due to a fracture of the sacrum. In the emergency department troponin was elevated to 0.019, WBC elevated to 16.9, patient was hypoxic at 90% and RR between 30 and 40. UA was negative for infection but positive for ketones and bilirubin. Chest X Ray showed bilateral pleural effusions. CT abdomen confirmed bilateral pleural effusions, fecal impaction and severe diverticulosis with no convincing evidence for diverticulitis. She denies chest pain or abdominal pain. She continues to be nauseous and have no appetite but no more emesis since coming to the ED. Restrictions/Precautions:  Restrictions/Precautions: Fall Risk, General Precautions  Position Activity Restriction  Other position/activity restrictions: Hx of pelvic fx 3 wks ago; uses a pocket talker; skin protection- pt with pressure sores on her coccyx and buttocks     SUBJECTIVE: Nurse ok'd session. Patient lying in bed upon arrival. Agreeable to OT session    PAIN: Complains of pain in back.  Nurse Radhames Peñaloza notified     Vitals: Acquanetta Harden not assessed per clinical judgement, see nursing flowsheet    COGNITION: Decreased Recall, Decreased Insight, Decreased Problem Solving and Decreased Safety Awareness    ADL:   Grooming: with set-up. Utilizing swab in mouth multiple times seated in chair due to mouth being dry  Lower Extremity Dressing: Maximum Assistance. To don B socks. BALANCE:  Sitting Balance:  Contact Guard Assistance. Seated EOB  Standing Balance: Moderate Assistance. with BUE support on therapist UJolly- patient stating \"I can't do it. Just let me go back to bed. \" Completed standing x2 trials requiring cueing for safe/proper technique and upright posture    BED MOBILITY:  Rolling to Right: Minimal Assistance - increased time, cueing for safe technique  Supine to Sit: Moderate Assistance - increased time, use of side rail, cueing for technique    TRANSFERS:  Sit to Stand: Moderate Assistance. From EOB with cueing for safe/proper technique  Stand to Sit: Moderate Assistance. To bedside chair, EOB with cueing for safe/proper technique  Stand Pivot: Maximum Assistance. - modA from EOB to bedside chair towards R side, cueing provided for safe/proper technique. Unsteadiness noted. Patient very fearful       ASSESSMENT:     Activity Tolerance:  Patient tolerance of  treatment: fair. Discharge Recommendations: [address], Patient would benefit from continued therapy after discharge   Equipment Recommendations: Equipment Needed: No  Other: Will further assess  Plan: Times per week: 3-5x  Specific instructions for Next Treatment: Functional mobility when able; ADLs while upright; pain management techniques  Current Treatment Recommendations: Balance Training, Endurance Training, Functional Mobility Training, Self-Care / ADL, Cognitive Reorientation, Strengthening, Pain Management  Plan Comment: Pt would benefit from continued skilled OT services as she can tolerate while at SNF.     Patient Education  Patient Education: safety with transfers, bed mobility technique, standing balance/posture    Goals  Short term goals  Time Frame for Short term goals: By discharge  Short term goal 1: Pt will complete stand-pivot t/fs with 0>mod A x 1 for increased ease of BSC t/fs  Short term goal 2: Pt will participate in upper body ADLs while in an upright position with setup A to increase her independence with self care. Short term goal 3: Pt will complete BUE light or moderate resistance exercises with min cues for slow breathing to increase her endurance and strength for ease of doing self care. Short term goal 4: Pt will engage in relaxed breathing and keeping her eyes open while doing any bed mobility or activities in sitting position to increase her pain tolerance and ability to participate. Following session, patient left in safe position with all fall risk precautions in place.

## 2021-03-30 NOTE — PLAN OF CARE
Problem: Falls - Risk of:  Goal: Will remain free from falls  Description: Will remain free from falls  Note: No falls this shift. Up to chair this morning. Side rails up x 2 when in bed and and bed and chair alarms in use. Call light within reach. Problem: Falls - Risk of:  Goal: Absence of physical injury  Description: Absence of physical injury  Outcome: Met This Shift     Problem: Skin Integrity:  Goal: Will show no infection signs and symptoms  Description: Will show no infection signs and symptoms  Outcome: Ongoing  Note: Afebrile. No signs or symptoms of infection noted. Problem: Skin Integrity:  Goal: Absence of new skin breakdown  Description: Absence of new skin breakdown  Outcome: Met This Shift  Note: No new skin breakdown noted - does have redness and stage 2 to her bottom and turning and repositioning is done often     Problem: Discharge Planning:  Goal: Discharged to appropriate level of care  Description: Discharged to appropriate level of care  Outcome: Ongoing  Note: Plan is for patient to go back to ECF at discharge     Problem: Gas Exchange - Impaired:  Goal: Levels of oxygenation will improve  Description: Levels of oxygenation will improve  Outcome: Met This Shift  Note: Pulse ox in mid 90s without the use of supplemental O2     Problem: Infection, Septic Shock:  Goal: Will show no infection signs and symptoms  Description: Will show no infection signs and symptoms  Outcome: Met This Shift     Problem: Pain:  Goal: Pain level will decrease  Description: Pain level will decrease  Outcome: Not Met This Shift  Note: Patient continues to c/o of pain levels up to 10 today at intervals.   Working with pain management to try and bring pain under control     Problem: Pain:  Goal: Control of acute pain  Description: Control of acute pain  Outcome: Not Met This Shift     Problem: Nutrition  Goal: Optimal nutrition therapy  3/30/2021 1720 by Timothy Kapoor RN  Outcome: Not Met This

## 2021-03-30 NOTE — PROGRESS NOTES
IM Progress Note  Dr. Richarda Mcburney  3/30/2021 10:42 AM      Patient name Danita Casper  [id number]  PCP: Florentino Brooks MD  Admit Date: 3/25/2021  Acct No. [de-identified]    Subjective: Interval History:   Pt still with significant pain  On norco q 4hrs  Lying in bed        Diet: Dietary Nutrition Supplements: Clear Liquid Oral Supplement  DIET GENERAL;    I/O last 3 completed shifts: In: 453 [P.O.:200; I.V.:253]  Out: 1075 [Urine:1075]  No intake/output data recorded. Admission weight: 140 lb (63.5 kg) as of 3/25/2021  7:17 AM  Wt Readings from Last 3 Encounters:   03/30/21 129 lb 12.8 oz (58.9 kg)   03/13/21 131 lb 6.4 oz (59.6 kg)   05/14/19 168 lb (76.2 kg)     Body mass index is 19.17 kg/m². ROS   CVS;  no cp or palpitation  Resp: no SOB or cough  Neuro:  No numbness or weakness or dizziness  Abd: no nausea or vomiting,c/o back pain      Medications:   Scheduled Meds:   amoxicillin-clavulanate  1 tablet Oral Q12H    pantoprazole  40 mg Oral QAM AC    lidocaine  3 patch Transdermal Daily    neomycin-bacitracin-polymyxin   Topical TID    aspirin  81 mg Oral Daily    amLODIPine  2.5 mg Oral Daily    docusate sodium  100 mg Oral BID    donepezil  5 mg Oral Dinner    ferrous sulfate  325 mg Oral BID    hydrALAZINE  75 mg Oral Q8H    metoprolol tartrate  25 mg Oral BID    QUEtiapine  25 mg Oral BID    senna  2 tablet Oral Nightly    traZODone  25 mg Oral Nightly    sodium chloride flush  10 mL Intravenous 2 times per day    enoxaparin  40 mg Subcutaneous Daily    ipratropium-albuterol  1 ampule Inhalation Q4H WA     Continuous Infusions:      Labs :     CBC:   No results for input(s): WBC, HGB, PLT in the last 72 hours. BMP:    Recent Labs     03/28/21  0823 03/30/21  0349   NA  --  134*   K 3.8 3.6   CL  --  104   CO2  --  20*   BUN  --  6*   CREATININE  --  0.4   GLUCOSE  --  83     Hepatic:   No results for input(s): AST, ALT, ALB, BILITOT, ALKPHOS in the last 72 hours.   Troponin: No results for input(s): TROPONINI in the last 72 hours. BNP: No results for input(s): BNP in the last 72 hours. Lipids: No results for input(s): CHOL, HDL in the last 72 hours. Invalid input(s): LDLCALCU  INR: No results for input(s): INR in the last 72 hours. Radiology    Objective:   Vitals: BP (!) 166/66   Pulse 74   Temp 98.8 °F (37.1 °C) (Oral)   Resp 22   Ht 5' 9\" (1.753 m)   Wt 129 lb 12.8 oz (58.9 kg)   SpO2 95%   BMI 19.17 kg/m²   HEENT: Head:pupils react  Neck: supple  Lungs: clear to auscultation  Heart: regular rate and rhythm   Abdomen: soft BS heard   Extremities: warm  edema  Neurologic:  Awake and appears uncomfortable     Impression:   :   Possible aspiration pneumonia with sepsis  Recent sacral fracture with fall  Significant back pain  N/V improved  LBBB not new  HTN  Breast Cancer history s/p mstectomy  GERD  Dementia  Elevated trop  Leucocytosis improved  Mild to mod Aortic stenosis valve 1.3sq cm          Plan:     Will check with pain management if fentanyl can be considered as pt has no improvement with norco  Cont lidoderm patch  Palliative care consult for goals of care  Bowel program    Malinda Lopez MD

## 2021-03-30 NOTE — CARE COORDINATION
3/30/21, 11:35 AM EDT    DISCHARGE ON [address] day: 5  Location: -24/024-A Reason for admit: Sepsis [facility]) [A41.9]   Procedure:   3/25 Limited Echo - EF 50%. Left atrium moderately dilated. Small circumferential pericardial effusion. Barriers to Discharge: Remains on 3B, Pain management was consulted yesterday. Pain meds adjusted. PT/OT. Lidoderm patches increased to 3 patches daily to painful areas. Pt continues to complain of pain, Palliative Care now consulted. PCP: Deidre Gil MD  Readmission Risk Score: 21%  Patient Goals/Plan/Treatment Preferences: [facility]. SW following.

## 2021-03-30 NOTE — PROCEDURES
EKG was handed to [facility],  [facility].

## 2021-03-30 NOTE — PLAN OF CARE
Problem: Nutrition  Goal: Optimal nutrition therapy  3/30/2021 1224 by Zheng Shea RD, LD  Outcome: Ongoing  Nutrition Problem #1: Severe malnutrition  Intervention: Food and/or Nutrient Delivery: Continue Current Diet, Modify Oral Nutrition Supplement  Nutritional Goals: Patient will consume 75% or more of meals to aid in wound healing during LOS.

## 2021-03-30 NOTE — PLAN OF CARE
Problem: Falls - Risk of:  Goal: Will remain free from falls  Description: Will remain free from falls  Outcome: Ongoing  Goal: Absence of physical injury  Description: Absence of physical injury  Outcome: Ongoing     Problem: Skin Integrity:  Goal: Will show no infection signs and symptoms  Description: Will show no infection signs and symptoms  Outcome: Ongoing  Goal: Absence of new skin breakdown  Description: Absence of new skin breakdown  Outcome: Ongoing     Problem: Pain:  Goal: Pain level will decrease  Description: Pain level will decrease  Outcome: Ongoing  Goal: Control of acute pain  Description: Control of acute pain  Outcome: Ongoing  Goal: Control of chronic pain  Description: Control of chronic pain  Outcome: Ongoing     Problem: Nutrition  Goal: Optimal nutrition therapy  Outcome: Ongoing   . Jessa matthieu Care plan reviewed with patient . Patient verbalize understanding of the plan of care and contribute to goal setting.

## 2021-03-30 NOTE — PROGRESS NOTES
Michelle Ferguson 60  PHYSICAL THERAPY MISSED TREATMENT NOTE  [facility] CCU-STEPDOWN 3B    Date: 3/30/2021  Patient Name: Mateo Kramer        MRN: [id number]   : 10/3/1931  (80 y.o.)  Gender: female   Referring Practitioner: Chapo Child MD  Diagnosis: Sepsis         REASON FOR MISSED TREATMENT:  Nursing ok'd therapy if pt was agreeable as she was up earlier and in a lot of pain, she has had pain meds since then. Pt sleeping on arrival and declined any activity, ex in bed or even repositioning in bed due to fear of pain. Will check back next available date .

## 2021-03-31 NOTE — PROGRESS NOTES
IM Progress Note  Dr. Chintan Us  3/31/2021 1:10 PM      Patient name Arti Araujo  [id number]  PCP: Lesa Hurley MD  Admit Date: 3/25/2021  Acct No. [de-identified]    Subjective: Interval History:   Spoke to [employer]   All questions answered  As pain is acceptable today agree to discharge  Also informed pt not interested improving much and does not want PT   Informed they can go by on a day to day basis and consult hospice when pt interested        Diet: DIET GENERAL;  Dietary Nutrition Supplements: Clear Liquid Oral Supplement  Dietary Nutrition Supplements: Low Volume Supplement    I/O last 3 completed shifts: In: 36 [P.O.:720; I.V.:10]  Out: 1345 [Urine:1345]  No intake/output data recorded. Admission weight: 140 lb (63.5 kg) as of 3/25/2021  7:17 AM  Wt Readings from Last 3 Encounters:   03/31/21 130 lb (59 kg)   03/13/21 131 lb 6.4 oz (59.6 kg)   05/14/19 168 lb (76.2 kg)     Body mass index is 19.2 kg/m². ROS   CVS;  no cp or palpitation  Resp: no SOB or cough  Neuro:  No numbness or weakness or dizziness  Abd: no nausea or vomiting,c/o back pain      Medications:   Scheduled Meds:   acetaminophen  650 mg Oral 4 times per day    QUEtiapine  25 mg Oral Nightly    pantoprazole  40 mg Oral QAM AC    lidocaine  3 patch Transdermal Daily    neomycin-bacitracin-polymyxin   Topical TID    aspirin  81 mg Oral Daily    amLODIPine  2.5 mg Oral Daily    docusate sodium  100 mg Oral BID    donepezil  5 mg Oral Dinner    ferrous sulfate  325 mg Oral BID    hydrALAZINE  75 mg Oral Q8H    metoprolol tartrate  25 mg Oral BID    senna  2 tablet Oral Nightly    sodium chloride flush  10 mL Intravenous 2 times per day    enoxaparin  40 mg Subcutaneous Daily    ipratropium-albuterol  1 ampule Inhalation Q4H WA     Continuous Infusions:      Labs :     CBC:   No results for input(s): WBC, HGB, PLT in the last 72 hours.   BMP:    Recent Labs     03/30/21  0349   *   K 3.6      CO2 20*   BUN 6*   CREATININE 0.4   GLUCOSE 83     Hepatic:   No results for input(s): AST, ALT, ALB, BILITOT, ALKPHOS in the last 72 hours. Troponin: No results for input(s): TROPONINI in the last 72 hours. BNP: No results for input(s): BNP in the last 72 hours. Lipids: No results for input(s): CHOL, HDL in the last 72 hours. Invalid input(s): LDLCALCU  INR: No results for input(s): INR in the last 72 hours.     Radiology    Objective:   Vitals: BP (!) 140/63   Pulse 64   Temp 98.2 °F (36.8 °C) (Oral)   Resp 16   Ht 5' 9\" (1.753 m)   Wt 130 lb (59 kg)   SpO2 96%   BMI 19.20 kg/m²   HEENT: Head:pupils react  Neck: supple  Lungs: clear to auscultation  Heart: regular rate and rhythm   Abdomen: soft BS heard   Extremities: warm  edema  Neurologic:  Awake and appears comfortable today lying in bed    Impression:   :   Possible aspiration pneumonia with sepsis  Recent sacral fracture with fall  Significant back pain  N/V improved  LBBB not new  HTN  Breast Cancer history s/p mstectomy  GERD  Dementia  Elevated trop  Leucocytosis improved  Mild to mod Aortic stenosis valve 1.3sq cm          Plan:    will discharge pt home   Once pt and family agreeable comfort measures to be considered    Timi Scott MD

## 2021-03-31 NOTE — PLAN OF CARE
Problem: RESPIRATORY  Goal: Clear lung sounds  Description: Clear lung sounds  Outcome: Ongoing  Note: Tx to help improve lung aeration.

## 2021-03-31 NOTE — PROGRESS NOTES
[address] [place] [address]  Occupational Therapy  Daily Note  Time:   Time In:   Time Out: 0803  Timed Code Treatment Minutes: 24 Minutes  Minutes: 24          Date: 3/31/2021  Patient Name: Mervin Holland,   Gender: female      Room: -24/024-A  MRN: [id number]  : 10/3/1931  (80 y.o.)  Referring Practitioner: Dr. Mahogany Seth MD  Diagnosis: Sepsis  Additional Pertinent Hx: Pt presents with nausea and vomiting for the past 24 hours. Patient resides at [facility] and they called the family this morning stating that she had been vomiting through the night and had not eaten anything since yesterday afternoon. Patient is confused but this is baseline for her per family. She was recently admitted to [facility] for surgery due to a fracture of the sacrum. In the emergency department troponin was elevated to 0.019, WBC elevated to 16.9, patient was hypoxic at 90% and RR between 30 and 40. UA was negative for infection but positive for ketones and bilirubin. Chest X Ray showed bilateral pleural effusions. CT abdomen confirmed bilateral pleural effusions, fecal impaction and severe diverticulosis with no convincing evidence for diverticulitis. She denies chest pain or abdominal pain. She continues to be nauseous and have no appetite but no more emesis since coming to the ED. Restrictions/Precautions:  Restrictions/Precautions: Fall Risk, General Precautions  Position Activity Restriction  Other position/activity restrictions: Hx of pelvic fx 3 wks ago; uses a pocket talker; skin protection- pt with pressure sores on her coccyx and buttocks     SUBJECTIVE: Nurse ok'd session. Patient lying in bed upon arrival. Agreeable to OT session    PAIN: Complains of back pain ,did not rate    Vitals: Vitals not assessed per clinical judgement, see nursing flowsheet    COGNITION: Decreased Recall, Decreased Insight, Decreased Problem Solving and Decreased Safety Awareness    ADL:   Grooming: with set-up. For hair care and to wash face seated in chair  Lower Extremity Dressing: Maximum Assistance. To don B socks seated EOB, patient attempted to complete however when bending forward demonstrated increase of back pain, unable to cross legs to complete this AM.    BALANCE:  Sitting Balance:  Stand By Assistance. Seated EOB  Standing Balance: Minimal Assistance. - CGA with BUE support on walker. Standing for approx 1 minute 30 seconds requiring cueing for upright posture, required seated rest break due to increase of back pain. Completed to increase activity tolerance and balance required for ADLs    BED MOBILITY:  Supine to Sit: Contact Guard Assistance - increased time, use of side rail    TRANSFERS:  Sit to Stand:  Minimal Assistance. From EOB and bedside chair with increased time and verbal cues for safe technique  Stand to Sit: Minimal Assistance. To bedside chair x2 trials with cueing provided for safe technique  Stand Pivot: Minimal Assistance. From EOB to bedside chair using RW towards R side. Completed at slow pace. 1 vc for safe technique    ADDITIONAL ACTIVITIES:  Patient completed BUE AROM exercises x10 reps x1 set in all joints/planes seated in chair. Brief rest breaks required. Completed to increase activity tolerance and maintain joint integrity required for ADLs     ASSESSMENT:     Activity Tolerance:  Patient tolerance of  treatment: fair.        Discharge Recommendations: [address], Patient would benefit from continued therapy after discharge   Equipment Recommendations: Equipment Needed: No  Other: Will further assess  Plan: Times per week: 3-5x  Specific instructions for Next Treatment: Functional mobility when able; ADLs while upright; pain management techniques  Current Treatment Recommendations: Balance Training, Endurance Training, Functional Mobility Training, Self-Care / ADL, Cognitive Reorientation, Strengthening, Pain Management  Plan Comment: Pt would benefit from continued skilled OT services as she can tolerate while at SNF. Patient Education  Patient Education: ADL's and safety with transfers, bed mobility technique, standing posture/balance    Goals  Short term goals  Time Frame for Short term goals: By discharge  Short term goal 1: Pt will complete stand-pivot t/fs with 0>mod A x 1 for increased ease of BSC t/fs  Short term goal 2: Pt will participate in upper body ADLs while in an upright position with setup A to increase her independence with self care. Short term goal 3: Pt will complete BUE light or moderate resistance exercises with min cues for slow breathing to increase her endurance and strength for ease of doing self care. Short term goal 4: Pt will engage in relaxed breathing and keeping her eyes open while doing any bed mobility or activities in sitting position to increase her pain tolerance and ability to participate. Following session, patient left in safe position with all fall risk precautions in place.

## 2021-03-31 NOTE — PROGRESS NOTES
[address] [address]  INPATIENT PHYSICAL THERAPY  DAILY NOTE  [facility] CCU-STEPDOWN 3B - 3B-24/024-A      Time In: 6872  Time Out: 1889  Timed Code Treatment Minutes: 24 Minutes  Minutes: 24          Date: 3/31/2021  Patient Name: Merissa Gipson,  Gender:  female        MRN: [id number]  : 10/3/1931  (80 y.o.)     Referring Practitioner: Luis Pascal MD  Diagnosis: Sepsis  Additional Pertinent Hx: Pt presents with nausea and vomiting for the past 24 hours. Patient resides at [facility] and they called the family this morning stating that she had been vomiting through the night and had not eaten anything since yesterday afternoon. Patient is confused but this is baseline for her per family. She was recently admitted to [facility] for surgery due to a fracture of the sacrum. In the emergency department troponin was elevated to 0.019, WBC elevated to 16.9, patient was hypoxic at 90% and RR between 30 and 40. UA was negative for infection but positive for ketones and bilirubin. Chest X Ray showed bilateral pleural effusions. CT abdomen confirmed bilateral pleural effusions, fecal impaction and severe diverticulosis with no convincing evidence for diverticulitis. She denies chest pain or abdominal pain. She continues to be nauseous and have no appetite but no more emesis since coming to the ED. Prior Level of Function:  Lives With: Alone  Type of Home: Facility  Home Layout: One level  Home Access: Level entry   Bathroom Shower/Tub: Walk-in shower  Bathroom Toilet: Bedside commode    Receives Help From: Other (comment)(staff as available)  ADL Assistance: Needs assistance  Homemaking Assistance: Needs assistance  Homemaking Responsibilities: No  Transfer Assistance: Needs assistance  Active : No  Additional Comments: Pt was not able to describe her prior level of function.   She was doing her own showering while at home prior to her admission to skilled therapy   Activity Tolerance:  Patient tolerance of  treatment: fair. Equipment Recommendations: Other: monitor for needs  Discharge Recommendations:    Continue to assess pending progress, Subacute/Skilled Nursing Facility    Plan: Times per week: 3-5x GM  Current Treatment Recommendations: Strengthening, Home Exercise Program, Safety Education & Training, Patient/Caregiver Education & Training, Balance Training, Endurance Training, Functional Mobility Training, Transfer Training, Gait Training    Patient Education  Patient Education: Plan of Care, Transfers    Goals:  Patient goals : pain relief  Short term goals  Time Frame for Short term goals: at discharge  Short term goal 1: Pt to be Supervision for supine <> sit to get in/out of bed  Short term goal 2: Pt to be SBA for sit <> stand to get up to ambulate  Short term goal 3: Pt to ambulate > 15 ft with RW with CGA to get to bathroom  Long term goals  Time Frame for Long term goals : not set due to short ELOS    Following session, patient left in safe position with all fall risk precautions in place.

## 2021-04-01 NOTE — DISCHARGE SUMMARY
not in any respiratory distress and her white count  was normal.    FINAL DIAGNOSES:  1. Possibly aspiration pneumonia with sepsis. 2.  Recent sacral fracture, status post sacroplasty with significant  pain. 3.  Nausea and vomiting, resolved. 4.  Left bundle-branch block, not new. 5.  Hypertension. 6.  Acid reflux. 7.  Dementia. 8.  Elevated troponin, managed medically. 9.  Leukocytosis, improved. 10.  Mild-to-moderate aortic stenosis. DISCHARGE MEDICATIONS:  To continue as addressed in discharge sheet. DISPOSITION:  Back to skilled. DIET:  As tolerated. ACTIVITY:  PT/OT to continue if the patient is willing. CONDITION ON DISCHARGE:  Stable. I spent more than 30 minutes that involved examining the patient,  reviewing the chart, and reconciling the med rec sheet.         Katty Beatty M.D.    D: 03/31/2021 13:38:29       T: 03/31/2021 15:39:38     MARY [email or web address]  Job#: [id number]     Doc#: [id number]    CC:
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
## 2021-04-09 PROBLEM — F43.20 ADJUSTMENT DISORDER, UNSPECIFIED: Status: ACTIVE | Noted: 2021-01-01
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
## 2021-04-12 PROBLEM — Z86.59 HISTORY OF DEPRESSION: Status: ACTIVE | Noted: 2021-01-01

## 2021-04-12 PROBLEM — K59.09 OTHER CONSTIPATION: Status: ACTIVE | Noted: 2021-01-01
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
## 2022-09-01 NOTE — PROGRESS NOTES
1. Have you been to the ER, urgent care clinic since your last visit? Hospitalized since your last visit?no    2. Have you seen or consulted any other health care providers outside of the [address] [place] [address] since your last visit? Include any pap smears or colon screening.  No    Chief Complaint   Patient presents with    GI Problem     3 most recent PHQ Screens 9/1/2022   PHQ Not Done -   Little interest or pleasure in doing things Not at all   Feeling down, depressed, irritable, or hopeless Not at all   Total Score PHQ 2 0   Trouble falling or staying asleep, or sleeping too much -   Feeling tired or having little energy -   Poor appetite, weight loss, or overeating -   Feeling bad about yourself - or that you are a failure or have let yourself or your family down -   Trouble concentrating on things such as school, work, reading, or watching TV -   Moving or speaking so slowly that other people could have noticed; or the opposite being so fidgety that others notice -   Thoughts of being better off dead, or hurting yourself in some way -   PHQ 9 Score -   How difficult have these problems made it for you to do your work, take care of your home and get along with others - [address] [place] [address]  INPATIENT PHYSICAL THERAPY  DAILY NOTE  [facility] ORTHOPEDICS 7K - 7K-05/005-A      Time In: 5981  Time Out: 1346  Timed Code Treatment Minutes: 33 Minutes  Minutes: 33          Date: 3/9/2021  Patient Name: Angela Triplett,  Gender:  female        MRN: [id number]  : 10/3/1931  (80 y.o.)     Referring Practitioner: Lavinia West PA-C  Diagnosis: Fall at home, initial encounter  Additional Pertinent Hx: Pt admitted after having had a mechanical fall while at home. She had suffered S2 fx and CHI with scalp laceration. She had repair if her scalp laceration while at ER. She was seen by orthopedics and it was determined that her S2 fracture was not operable. She was cleared to participate in therapy as much as she tolerates. Prior Level of Function:  Lives With: Alone  Type of Home: Assisted living  Home Layout: One level  Home Access: Level entry  Home Equipment: Wheelchair-manual, Rolling walker(occasional use of RW prior)   Bathroom Accessibility: Accessible    Receives Help From: Family  ADL Assistance: Independent  Homemaking Assistance: Needs assistance  Homemaking Responsibilities: Yes  Ambulation Assistance: Independent  Transfer Assistance: Independent  Active : No  Additional Comments: Pt was ambulating short distances with the rolling walker. She would use the W/C for some activities such as transporting her laundry to/from the laundry room. Pt has delivered meals 7x/wk.     Restrictions/Precautions:  Restrictions/Precautions: Weight Bearing, General Precautions, Fall Risk  Right Lower Extremity Weight Bearing: Weight Bearing As Tolerated  Left Lower Extremity Weight Bearing: Weight Bearing As Tolerated  Position Activity Restriction  Other position/activity restrictions: Scalp wound repair-- pt [place] uses the pocket talker ,    SUBJECTIVE: pt in bed she was agreeable for therapy but c/o of fatigue and did ask to return to bed following session     PAIN: no pain at rest however with mobility and wbing pain in low back and pain more so at left buttock with gait     Vitals: Vitals not assessed per clinical judgement, see nursing flowsheet    OBJECTIVE:  Bed Mobility:  Supine to Sit: Minimal Assistance, with extra time to complete task, she wasn't able to tolerate the bed being flat and used rail to assist   Sit to Supine: Moderate Assistance, she needed assist at henna LEs    Scooting: Minimal Assistance, with extra time and took extra time     Transfers:  Sit to Stand: Minimal Assistance, from chair and mod from bed pt needed cues for hand placement she c/o of pain with transfer and took 2 attempts to stand from bed   Stand to [address], cues to control descend    Ambulation:  Minimal Assistance  Distance: 10x2  Surface: Level Tile  Device:Rolling Walker  Gait Deviations:  Slow niki with flexed posture and shorten step lengths, pt needed assist to guide the walker         Exercise:  Patient was guided in 1 set(s) 10 reps of exercise to both lower extremities. Seated marches, Seated heel/toe raises, Long arc quads, Mini squats and hip abd/add . Exercises were completed for increased independence with functional mobility. Functional Outcome Measures: Completed  AM-PAC Inpatient Mobility Raw Score : 16  AM-PAC Inpatient T-Scale Score : 40.78    ASSESSMENT:  Assessment: pt was able to tolerate increased activity this date however still required much assist with mobility, transfers and gait, pt would greatly benefit from cont skilled therapy prior to discharge back to her apt. Activity Tolerance:  Patient tolerance of  treatment: fair. She required rest breaks due to pain        Equipment Recommendations:Equipment Needed: No  Discharge Recommendations:    Continue to assess pending progress(pt would benefit from a therapy stay prior to return to her apt.  she will require assist for all mobility and gait and only walking short distances)    Plan: Times per week: 5x O Times per day: Daily  Current Treatment Recommendations: Strengthening, Home Exercise Program, Safety Education & Training, Balance Training, Endurance Training, Patient/Caregiver Education & Training, Functional Mobility Training, Gait Training, Transfer Training, Stair training    Patient Education  Patient Education: Plan of Care    Goals:  Patient goals : to move my body  Short term goals  Time Frame for Short term goals: by discharge  Short term goal 1: bed mobility with HOB flat and no use of rails, mod ind to increase functional ind  Short term goal 2: sit <> stand from various surfaces with use of 2WW and supervision assist in order to increase functional ind  Short term goal 3: amb 48' with use of 2WW and supervison assist in order to safely navigate home  Long term goals  Time Frame for Long term goals : NA due to short ELOS    Following session, patient left in safe position with all fall risk precautions in place.

## (undated) DEVICE — ENDO KIT: Brand: MEDLINE INDUSTRIES, INC.

## (undated) DEVICE — GOWN,SIRUS,NON REINFRCD,LARGE,SET IN SL: Brand: MEDLINE

## (undated) DEVICE — ARM DRAPE

## (undated) DEVICE — DRAPE C ARM W36XL30IN RECTANG BND BG AND TAPE

## (undated) DEVICE — DRESSING,GAUZE,XEROFORM,CURAD,5"X9",ST: Brand: CURAD

## (undated) DEVICE — CONVERTED USE 338908 SPONGES LAP 18X18 ST

## (undated) DEVICE — PACK PROCEDURE SURG ORTH BASIC SRHP LF

## (undated) DEVICE — BAG SPEC REM 224ML W4XL6IN DIA10MM 1 HND GYN DISP ENDOPCH

## (undated) DEVICE — BLADELESS OBTURATOR: Brand: WECK VISTA

## (undated) DEVICE — CANISTER, RIGID, 2000CC: Brand: MEDLINE INDUSTRIES, INC.

## (undated) DEVICE — TUBING, SUCTION, 1/4" X 20', STRAIGHT: Brand: MEDLINE INDUSTRIES, INC.

## (undated) DEVICE — SUTURE MCRYL SZ 4-0 L27IN ABSRB UD L19MM PS-2 1/2 CIR PRIM Y426H

## (undated) DEVICE — SHEET, T, LAPAROTOMY, STERILE: Brand: MEDLINE

## (undated) DEVICE — SOLUTION ANTIFOG VIS SYS CLEARIFY LAPSCP

## (undated) DEVICE — HEWSON SUTURE RETRIEVER: Brand: HEWSON SUTURE RETRIEVER

## (undated) DEVICE — ADHESIVE SKIN CLSR 0.7ML TOP DERMBND ADV

## (undated) DEVICE — GLOVE ORANGE PI 7   MSG9070

## (undated) DEVICE — HUMERAL 3.2MM X 248MM THREADED TIP                                    GUIDE PIN: Brand: TRIGEN

## (undated) DEVICE — PAD,NON-ADHERENT,3X8,STERILE,LF,1/PK: Brand: MEDLINE

## (undated) DEVICE — BASIC SINGLE BASIN BTC-LF: Brand: MEDLINE INDUSTRIES, INC.

## (undated) DEVICE — SYRINGE MED 10ML LUERLOCK TIP W/O SFTY DISP

## (undated) DEVICE — DRAPE,U/SHT,SPLIT,FILM,60X84,STERILE: Brand: MEDLINE

## (undated) DEVICE — GOWN,SIRUS,NONRNF,SETINSLV,XL,20/CS: Brand: MEDLINE

## (undated) DEVICE — SUTURE VCRL SZ 2-0 L27IN ABSRB UD L36MM CP-1 1/2 CIR REV J266H

## (undated) DEVICE — SUTURE ETHBND EXCEL SZ 2-0 L30IN NONABSORBABLE GRN L26MM SH X833H

## (undated) DEVICE — GLOVE ORANGE PI 8   MSG9080

## (undated) DEVICE — PACK PROCEDURE SURG SET UP SRMC

## (undated) DEVICE — BLANKET THER AD W24XL60IN FAB COVERING SUP SFT ULT THN LTWT

## (undated) DEVICE — SEAL

## (undated) DEVICE — SOLUTION IV 1000ML 0.9% SOD CHL PH 5 INJ USP VIAFLX PLAS

## (undated) DEVICE — CONTAINER,SPECIMEN,PNEU TUBE,4OZ,OR STRL: Brand: MEDLINE

## (undated) DEVICE — STRIP,CLOSURE,WOUND,MEDI-STRIP,1/2X4: Brand: MEDLINE

## (undated) DEVICE — GLOVE SURG SZ 9 THK91MIL LTX FREE SYN POLYISOPRENE ANTI

## (undated) DEVICE — GLOVE ORANGE PI 7 1/2   MSG9075

## (undated) DEVICE — SUTURE PERMA-HAND SZ 2-0 L30IN NONABSORBABLE BLK L26MM SH K833H

## (undated) DEVICE — C-ARM: Brand: UNBRANDED

## (undated) DEVICE — ROYAL SILK SURGICAL GOWN, XXL: Brand: CONVERTORS

## (undated) DEVICE — REDUCER: Brand: ENDOWRIST

## (undated) DEVICE — COLUMN DRAPE

## (undated) DEVICE — DRESSING TRNSPAR W5XL4.5IN FLM SHT SEMIPERMEABLE WIND

## (undated) DEVICE — GLOVE ORANGE PI 8 1/2   MSG9085

## (undated) DEVICE — ELECTROSURGICAL PENCIL BUTTON SWITCH E-Z CLEAN COATED BLADE ELECTRODE 10 FT (3 M) CORD HOLSTER: Brand: MEGADYNE

## (undated) DEVICE — BONE CEMENT CX01B KYPHON XPEDE W MXR US: Brand: KYPHON® XPEDE™ BONE CEMENT AND KYPHON® MIXER PACK

## (undated) DEVICE — ARM SLING: Brand: DEROYAL

## (undated) DEVICE — APPLICATOR MEDICATED 26 CC SOLUTION CLR STRL CHLORAPREP

## (undated) DEVICE — 3M™ TEGADERM™ TRANSPARENT FILM DRESSING FRAME STYLE, 1626W, 4 IN X 4-3/4 IN (10 CM X 12 CM), 50/CT 4CT/CASE: Brand: 3M™ TEGADERM™

## (undated) DEVICE — GAUZE,SPONGE,8"X4",12PLY,XRAY,STRL,LF: Brand: MEDLINE

## (undated) DEVICE — GENERAL LAPAROSCOPY PACK-LF: Brand: MEDLINE INDUSTRIES, INC.

## (undated) DEVICE — SUTURE VCRL SZ 4-0 L27IN ABSRB UD L19MM FS-2 3/8 CIR REV J422H

## (undated) DEVICE — SKIN AFFIX SURG ADHESIVE 72/CS 0.55ML: Brand: MEDLINE

## (undated) DEVICE — SURESHOT HUMERAL 3.2MM AO DRILL: Brand: TRIGEN

## (undated) DEVICE — KIT KEX152EB-CDS- 15/2 FF WITH CDS: Brand: KYPHPAK® FIRST FRACTURE TRAY

## (undated) DEVICE — CANNULA SEAL

## (undated) DEVICE — TUBING FLTR PLUME AWAY EVAC W/ SUCT DEV DISP PUREVIEW

## (undated) DEVICE — [HIGH FLOW INSUFFLATOR,  DO NOT USE IF PACKAGE IS DAMAGED,  KEEP DRY,  KEEP AWAY FROM SUNLIGHT,  PROTECT FROM HEAT AND RADIOACTIVE SOURCES.]: Brand: PNEUMOSURE

## (undated) DEVICE — SUTURE VCRL SZ 0 L27IN ABSRB UD L36MM CP-1 1/2 CIR REV CUT J267H

## (undated) DEVICE — TIP COVER ACCESSORY

## (undated) DEVICE — ELECTRO LUBE IS A SINGLE PATIENT USE DEVICE THAT IS INTENDED TO BE USED ON ELECTROSURGICAL ELECTRODES TO REDUCE STICKING.: Brand: KEY SURGICAL ELECTRO LUBE

## (undated) DEVICE — PUMP SUC IRR TBNG L10FT W/ HNDPC ASSEMB STRYKEFLOW 2

## (undated) DEVICE — HUMERAL 2.0MM X 600MM GRADUATED                                    BALL TIP GUIDE ROD
Type: IMPLANTABLE DEVICE | Site: ARM | Status: NON-FUNCTIONAL
Brand: TRIGEN
Removed: 2017-07-20

## (undated) DEVICE — YANKAUER,BULB TIP,W/O VENT,RIGID,STERILE: Brand: MEDLINE

## (undated) DEVICE — COVER ARMBRD W13XL28.5IN IMPERV BLU FOR OP RM

## (undated) DEVICE — PACK-MAJOR

## (undated) DEVICE — 3M™ BAIR HUGGER® MULTI ACCESS BLANKET, PEDIATRIC, FULL BODY, 10 PER CASE 31000: Brand: BAIR HUGGER™

## (undated) DEVICE — GAUZE,SPONGE,4"X4",12PLY,STERILE,LF,2'S: Brand: MEDLINE

## (undated) DEVICE — NEEDLE SPNL 22GA L35IN PNCL PNT ATRAUM TIP PENCAN

## (undated) DEVICE — BASIC SINGLE BASIN 1-LF: Brand: MEDLINE INDUSTRIES, INC.

## (undated) DEVICE — SUTURE ETHBND D SPEC NO 0 UR 6 30IN D9436

## (undated) DEVICE — GAUZE,SPONGE,2"X2",8PLY,STERILE,LF,2'S: Brand: MEDLINE

## (undated) DEVICE — GLOVE SURG SZ 65 THK91MIL LTX FREE SYN POLYISOPRENE

## (undated) DEVICE — SUTURE FIBERWIRE SZ 2 W/ TAPERED NEEDLE BLUE L38IN NONABSORB BLU L26.5MM 1/2 CIRCLE AR7200

## (undated) DEVICE — MEDI-VAC NON-CONDUCTIVE SUCTION TUBING 6MM X 6.1M (20 FT.) L: Brand: CARDINAL HEALTH

## (undated) DEVICE — LINER SUCT CANSTR 1500CC SEMI RIG W/ POR HYDROPHOBIC SHUT

## (undated) DEVICE — BONE BIOPSY DEVICE F07A TAPERED SIZE 2: Brand: MEDTRONIC REUSABLE INSTRUMENTS

## (undated) DEVICE — 2000CC GUARDIAN II: Brand: GUARDIAN

## (undated) DEVICE — TROCAR ENDOSCP L100MM DIA5MM BLDELSS STBL SL THRD OPT VW